# Patient Record
Sex: MALE | Race: WHITE | Employment: PART TIME | URBAN - NONMETROPOLITAN AREA
[De-identification: names, ages, dates, MRNs, and addresses within clinical notes are randomized per-mention and may not be internally consistent; named-entity substitution may affect disease eponyms.]

---

## 2020-08-05 ENCOUNTER — OFFICE VISIT (OUTPATIENT)
Dept: FAMILY MEDICINE CLINIC | Age: 65
End: 2020-08-05
Payer: MEDICARE

## 2020-08-05 VITALS
SYSTOLIC BLOOD PRESSURE: 130 MMHG | TEMPERATURE: 98.5 F | DIASTOLIC BLOOD PRESSURE: 82 MMHG | OXYGEN SATURATION: 98 % | HEIGHT: 68 IN | WEIGHT: 276 LBS | BODY MASS INDEX: 41.83 KG/M2 | HEART RATE: 84 BPM | RESPIRATION RATE: 14 BRPM

## 2020-08-05 PROCEDURE — G8417 CALC BMI ABV UP PARAM F/U: HCPCS | Performed by: NURSE PRACTITIONER

## 2020-08-05 PROCEDURE — 4004F PT TOBACCO SCREEN RCVD TLK: CPT | Performed by: NURSE PRACTITIONER

## 2020-08-05 PROCEDURE — 3017F COLORECTAL CA SCREEN DOC REV: CPT | Performed by: NURSE PRACTITIONER

## 2020-08-05 PROCEDURE — 1123F ACP DISCUSS/DSCN MKR DOCD: CPT | Performed by: NURSE PRACTITIONER

## 2020-08-05 PROCEDURE — 99203 OFFICE O/P NEW LOW 30 MIN: CPT | Performed by: NURSE PRACTITIONER

## 2020-08-05 PROCEDURE — 4040F PNEUMOC VAC/ADMIN/RCVD: CPT | Performed by: NURSE PRACTITIONER

## 2020-08-05 PROCEDURE — G8427 DOCREV CUR MEDS BY ELIG CLIN: HCPCS | Performed by: NURSE PRACTITIONER

## 2020-08-05 RX ORDER — PREDNISONE 10 MG/1
TABLET ORAL
Qty: 40 TABLET | Refills: 0 | Status: SHIPPED | OUTPATIENT
Start: 2020-08-05 | End: 2020-09-08

## 2020-08-05 RX ORDER — CLOBETASOL PROPIONATE 0.5 MG/G
OINTMENT TOPICAL
Qty: 1 TUBE | Refills: 2 | Status: SHIPPED | OUTPATIENT
Start: 2020-08-05 | End: 2021-07-26

## 2020-08-05 ASSESSMENT — PATIENT HEALTH QUESTIONNAIRE - PHQ9
SUM OF ALL RESPONSES TO PHQ QUESTIONS 1-9: 0
SUM OF ALL RESPONSES TO PHQ QUESTIONS 1-9: 0
SUM OF ALL RESPONSES TO PHQ9 QUESTIONS 1 & 2: 0
2. FEELING DOWN, DEPRESSED OR HOPELESS: 0
1. LITTLE INTEREST OR PLEASURE IN DOING THINGS: 0

## 2020-08-05 NOTE — PATIENT INSTRUCTIONS
Patient Education        Atopic Dermatitis: Care Instructions  Your Care Instructions  Atopic dermatitis (also called eczema) is a skin problem that causes intense itching and a red, raised rash. In severe cases, the rash develops clear fluid-filled blisters. The rash is not contagious. People with this condition seem to have very sensitive immune systems that are likely to react to things that cause allergies. The immune system is the body's way of fighting infection. There is no cure for atopic dermatitis, but you may be able to control it with care at home. Follow-up care is a key part of your treatment and safety. Be sure to make and go to all appointments, and call your doctor if you are having problems. It's also a good idea to know your test results and keep a list of the medicines you take. How can you care for yourself at home? · Use moisturizer at least twice a day. · If your doctor prescribes a cream, use it as directed. If your doctor prescribes other medicine, take it exactly as directed. · Wash the affected area with water only. Soap can make dryness and itching worse. Pat dry. · Apply a moisturizer after bathing. Use a cream such as Lubriderm, Moisturel, or Cetaphil that does not irritate the skin or cause a rash. Apply the cream while your skin is still damp after lightly drying with a towel. · Use cold, wet cloths to reduce itching. · Keep cool, and stay out of the sun. · If itching affects your normal activities, an over-the-counter antihistamine, such as diphenhydramine (Benadryl) or loratadine (Claritin) may help. Read and follow all instructions on the label. When should you call for help? Call your doctor now or seek immediate medical care if:  · Your rash gets worse and you have a fever. · You have new blisters or bruises, or the rash spreads and looks like a sunburn. · You have signs of infection, such as:  ? Increased pain, swelling, warmth, or redness.   ? Red streaks leading from the rash. ? Pus draining from the rash. ? A fever. · You have crusting or oozing sores. · You have joint aches or body aches along with your rash. Watch closely for changes in your health, and be sure to contact your doctor if:  · Your rash does not clear up after 2 to 3 weeks of home treatment. · Itching interferes with your sleep or daily activities. Where can you learn more? Go to [email or web address] org and sign in to your [email or web address] account. Enter U124 in the [employer] box to learn more about \"Atopic Dermatitis: Care Instructions. \"     If you do not have an account, please click on the \"Sign Up Now\" link. Current as of: October 31, 2019               Content Version: 12.5  © 9131-2431 [employer]. Care instructions adapted under license by [facility] [place] ([facility]). If you have questions about a medical condition or this instruction, always ask your healthcare professional. [place] [address] any warranty or liability for your use of this information. Patient Education        Psoriasis: Care Instructions  Your Care Instructions  Psoriasis (say \"dnj-XQ-jy-maryan\") is a long-term skin problem that causes thick, white, silvery, or red patches on the skin. The patches may be small or large, and they occur most often on the knees, elbows, scalp, hands, feet, or lower back. The skin may be scaly. If the condition is severe, your skin can become itchy and tender. Psoriasis also can be embarrassing if the patches are on visible areas. You can treat psoriasis with good care at home and with medicine from your doctor. You may put medicine on your skin and take pills or have shots to stop the redness and swelling. Your doctor also may suggest ultraviolet light treatments. Follow-up care is a key part of your treatment and safety. Be sure to make and go to all appointments, and call your doctor if you are having problems.  It's also a good idea to know your test results and keep a list of the medicines you take. How can you care for yourself at home? · If your doctor prescribes medicine, use it exactly as prescribed. Follow your doctor's advice for sunlight or ultraviolet light treatment. Call your doctor if you think you are having a problem with your medicine. · Protect your skin:  ? Keep your skin moist. After bathing, put an ointment, cream, or lotion on your skin while it is still damp. This seals in moisture. Use over-the-counter products that your doctor suggests. These may include Cetaphil, Lubriderm, or Eucerin. Petroleum jelly (such as Vaseline) and vegetable shortening (such as Crisco) also work. ? If you have psoriasis on your scalp, use a shampoo with salicylic acid, such as Neutrogena T/Sal.  ? Avoid harsh skin products, such as those that contain alcohol. ? Cover your skin in cold weather. ? Try to prevent sunburn. Although short periods of sun exposure reduce psoriasis in most people, too much sun can damage the skin and cause skin cancer. In addition, sunburns can trigger psoriasis. Use sunscreen on areas of your skin that do not have psoriasis. Make sure to use a broad-spectrum sunscreen that has a sun protection factor (SPF) of 30 or higher. Use it every day, even when it is cloudy. ? Take care to avoid accidents such as cutting or scraping your skin. An injury to the skin can cause psoriasis patches to form anywhere on the body, including the area of the injury. ? Avoid tight shoes, clothing, watchbands, and hats. These may irritate your skin. ? Use a vaporizer or humidifier to add moisture to your bedroom. Follow the directions for cleaning the machine. · Try making one or more changes to your daily habits to help with managing your psoriasis. For example:  ? Try to control stress and anxiety. They may cause psoriasis to appear suddenly or can make symptoms worse. ? If you smoke, think about quitting.  If you need help quitting,

## 2020-08-05 NOTE — PROGRESS NOTES
[facility]  [address] [place] [address]  Dept: [phone]  Loc: [phone]  Visit Date: 8/5/2020      HPI:   Angelito Guido is a 77 y.o. male thatpresents for Established New Doctor (no former family physician ); Rash (red itchy rash for past 1-2 weeks both arms, abd, groin area, started using Old Spice body wash ); Rash (non-itchy rash at different spots on both lower legs for past 2 months,  Pt does not feel they are related to the previous rash ); and Health Maintenance (due for colonoscopy )    HPI:  Upper body rash started a few weeks ago when he switched soaps  Also bilateral lower rash-had couple of months, no know precipitating factor, dry and scaling  Itching  No pain or burning  No drainage or open areas  Denies fever, chills, chest pain, or shortness of breath. He comes in today with his wife  History obtained from pt and medical records. I have reviewed the patient's past medical history, past surgical history, allergies,medications, social and family history and I have made updates where appropriate.     Past Medical History:   Diagnosis Date    HTN (hypertension) 2/6/2014       Past Surgical History:   Procedure Laterality Date    KNEE ARTHROSCOPY      right    ULNAR TUNNEL RELEASE         Social History     Tobacco Use    Smoking status: Never Smoker    Smokeless tobacco: Never Used   Substance Use Topics    Alcohol use: No    Drug use: No       Family History   Problem Relation Age of Onset    Heart Disease Father          Review of Systems  Constitutional: Negative for Fever, Chills, Fatigue  Cardiovascular:  Negative forChest Pain, Palpitations  Respiratory:  Negative for Cough, Wheezing, Shortness of breath  Gastrointestinal:  Nausea, Vomiting, Diarrhea, Constipation, Blood in stool  Genitourinary:  Negative for Difficulty or painful urination,Change in frequency, Urgency  Skin:  Negative for Color change, Wound +itching and rash  Psychiatric:  Negative forAnxiety, Depression, Suicidal ideation  Musculoskeletal:  Negative for Joint pain, Back pain, Gait problems  Neurological:  Negative for Dizziness, Headaches      PHYSICAL EXAM:  /82   Pulse 84   Temp 98.5 °F (36.9 °C) (Oral)   Resp 14   Ht 5' 8\" (1.727 m)   Wt 276 lb (125.2 kg)   SpO2 98%   BMI 41.97 kg/m²     General Appearance: well developed and well- nourished, in no acute distress  Head: normocephalic and atraumatic  Eyes: pupils equal, round, and reactive to light,  conjunctivae and eye lids without erythema  ENT: external ear and ear canal normal bilaterally, nose without deformity, nasal mucosa and turbinates normal without polyps, oropharynx normal, dentition is normal for age, no lip or gum lesions noted  Neck: supple and non-tender without mass  Pulmonary/Chest: clear to auscultation bilaterally- no wheezes, rales or rhonchi, normal air movement, no respiratory distress orretractions  Cardiovascular: normal rate, regular rhythm, normal S1 and S2, no murmurs, rubs, clicks, or gallops,distal pulses intact  Abdomen: soft, non-tender, non-distended, normal bowel sounds  Abdomen with maculopapular rash  Extremities: no cyanosis, clubbing or edema of the lower extremities. BLE with red dry scaly patches  Musculoskeletal: No joint swelling or gross deformity   Neuro:  Alert,  normal speech, no focal findings or movement disorder noted, gait wnl  Psych:  Normal affect without evidence of depression or anxiety, insight and judgement are appropriate, memoryappears intact  Skin: warm and dry      ASSESSMENT & PLAN  Tala Lynch was seen today for established new doctor, rash, rash and health maintenance. Diagnoses and all orders for this visit:    Hypertension, unspecified type  -     CBC With Auto Differential; Future  -     Comprehensive Metabolic Panel;  Future    Obesity, unspecified classification, unspecified obesity type, unspecified whether serious comorbidity present  -     CBC With Auto Differential; Future  -     Comprehensive Metabolic Panel; Future    Psoriasis  -     predniSONE (DELTASONE) 10 MG tablet; Take 4 tabs PO x 4 days, then take 3 tabs PO x 4 days, then take 2 tabs PO x 4 days, then take 1 tab PO x 4 days  -     clobetasol (TEMOVATE) 0.05 % ointment; Apply topically 2 times daily. Dermatitis  -     predniSONE (DELTASONE) 10 MG tablet; Take 4 tabs PO x 4 days, then take 3 tabs PO x 4 days, then take 2 tabs PO x 4 days, then take 1 tab PO x 4 days  -     clobetasol (TEMOVATE) 0.05 % ointment; Apply topically 2 times daily. Return in about 2 weeks (around 8/19/2020) for psoriasis. Will get labs  RTO in 2 weeks for recheck of rash, psoriasis, and review labs, finish well visit  Encouraged them to message/call with any questions or concerns. Sahil Tucker received counseling on the following healthy behaviors: medication adherence  Reviewedprior labs and health maintenance. Continue current medications, diet and exercise. Discussed use, benefit, and side effects of prescribed medications. Barriers to medication compliance addressed. Patient given educationalmaterials - see patient instructions. All patient questions answered. Patient voiced understanding.      Electronically signed by JENNY Turcios on 8/5/20 at 4:09 PM EDT

## 2020-08-10 ENCOUNTER — NURSE ONLY (OUTPATIENT)
Dept: LAB | Age: 65
End: 2020-08-10

## 2020-08-10 LAB
ALBUMIN SERPL-MCNC: 4.3 G/DL (ref 3.5–5.1)
ALP BLD-CCNC: 50 U/L (ref 38–126)
ALT SERPL-CCNC: 20 U/L (ref 11–66)
ANION GAP SERPL CALCULATED.3IONS-SCNC: 10 MEQ/L (ref 8–16)
AST SERPL-CCNC: 14 U/L (ref 5–40)
BASOPHILS # BLD: 1 %
BASOPHILS ABSOLUTE: 0.1 THOU/MM3 (ref 0–0.1)
BILIRUB SERPL-MCNC: 0.4 MG/DL (ref 0.3–1.2)
BUN BLDV-MCNC: 15 MG/DL (ref 7–22)
CALCIUM SERPL-MCNC: 9.3 MG/DL (ref 8.5–10.5)
CHLORIDE BLD-SCNC: 105 MEQ/L (ref 98–111)
CO2: 24 MEQ/L (ref 23–33)
CREAT SERPL-MCNC: 0.7 MG/DL (ref 0.4–1.2)
EOSINOPHIL # BLD: 3.5 %
EOSINOPHILS ABSOLUTE: 0.4 THOU/MM3 (ref 0–0.4)
ERYTHROCYTE [DISTWIDTH] IN BLOOD BY AUTOMATED COUNT: 13.5 % (ref 11.5–14.5)
ERYTHROCYTE [DISTWIDTH] IN BLOOD BY AUTOMATED COUNT: 45.6 FL (ref 35–45)
GFR SERPL CREATININE-BSD FRML MDRD: > 90 ML/MIN/1.73M2
GLUCOSE BLD-MCNC: 94 MG/DL (ref 70–108)
HCT VFR BLD CALC: 45.4 % (ref 42–52)
HEMOGLOBIN: 14.5 GM/DL (ref 14–18)
IMMATURE GRANS (ABS): 0.17 THOU/MM3 (ref 0–0.07)
IMMATURE GRANULOCYTES: 1.7 %
LYMPHOCYTES # BLD: 19.8 %
LYMPHOCYTES ABSOLUTE: 2 THOU/MM3 (ref 1–4.8)
MCH RBC QN AUTO: 29.5 PG (ref 26–33)
MCHC RBC AUTO-ENTMCNC: 31.9 GM/DL (ref 32.2–35.5)
MCV RBC AUTO: 92.5 FL (ref 80–94)
MONOCYTES # BLD: 9.3 %
MONOCYTES ABSOLUTE: 0.9 THOU/MM3 (ref 0.4–1.3)
NUCLEATED RED BLOOD CELLS: 0 /100 WBC
PLATELET # BLD: 301 THOU/MM3 (ref 130–400)
PMV BLD AUTO: 10 FL (ref 9.4–12.4)
POTASSIUM SERPL-SCNC: 3.8 MEQ/L (ref 3.5–5.2)
RBC # BLD: 4.91 MILL/MM3 (ref 4.7–6.1)
SEG NEUTROPHILS: 64.7 %
SEGMENTED NEUTROPHILS ABSOLUTE COUNT: 6.5 THOU/MM3 (ref 1.8–7.7)
SODIUM BLD-SCNC: 139 MEQ/L (ref 135–145)
TOTAL PROTEIN: 6.9 G/DL (ref 6.1–8)
WBC # BLD: 10 THOU/MM3 (ref 4.8–10.8)

## 2020-08-11 ENCOUNTER — TELEPHONE (OUTPATIENT)
Dept: FAMILY MEDICINE CLINIC | Age: 65
End: 2020-08-11

## 2020-08-11 NOTE — TELEPHONE ENCOUNTER
No answer.  No VM set up    Future Appointments   Date Time Provider Pramod Cristobal   8/19/2020  9:00 AM JENNY Kumar CNP [facility]

## 2020-08-11 NOTE — TELEPHONE ENCOUNTER
----- Message from Gladys Devi, DO sent at 8/11/2020  8:30 AM EDT -----  Please let pt know that labs are normal  Saw Dain Younger, is he going to f/u with her or still planning on EST with me  Either way is fine, Dain Younger wasn't sure  Thanks!

## 2020-08-11 NOTE — TELEPHONE ENCOUNTER
Per HIPAA, spoke with pt's wife Haile Zimmerman. She was informed of lab results and verbally understood. Pt is still wanting to est with . [History reviewed] : History reviewed. [Medications and Allergies reviewed] : Medications and allergies reviewed.

## 2020-08-19 ENCOUNTER — OFFICE VISIT (OUTPATIENT)
Dept: FAMILY MEDICINE CLINIC | Age: 65
End: 2020-08-19
Payer: MEDICARE

## 2020-08-19 VITALS
RESPIRATION RATE: 14 BRPM | HEIGHT: 68 IN | WEIGHT: 271.6 LBS | BODY MASS INDEX: 41.16 KG/M2 | DIASTOLIC BLOOD PRESSURE: 96 MMHG | SYSTOLIC BLOOD PRESSURE: 156 MMHG | OXYGEN SATURATION: 97 % | TEMPERATURE: 98 F | HEART RATE: 77 BPM

## 2020-08-19 PROCEDURE — G8417 CALC BMI ABV UP PARAM F/U: HCPCS | Performed by: NURSE PRACTITIONER

## 2020-08-19 PROCEDURE — 3017F COLORECTAL CA SCREEN DOC REV: CPT | Performed by: NURSE PRACTITIONER

## 2020-08-19 PROCEDURE — 99213 OFFICE O/P EST LOW 20 MIN: CPT | Performed by: NURSE PRACTITIONER

## 2020-08-19 PROCEDURE — 1036F TOBACCO NON-USER: CPT | Performed by: NURSE PRACTITIONER

## 2020-08-19 PROCEDURE — 1123F ACP DISCUSS/DSCN MKR DOCD: CPT | Performed by: NURSE PRACTITIONER

## 2020-08-19 PROCEDURE — 4040F PNEUMOC VAC/ADMIN/RCVD: CPT | Performed by: NURSE PRACTITIONER

## 2020-08-19 PROCEDURE — G8427 DOCREV CUR MEDS BY ELIG CLIN: HCPCS | Performed by: NURSE PRACTITIONER

## 2020-08-19 NOTE — PATIENT INSTRUCTIONS
Start Zyrtec (Cetrizine) 10 mg daily  Start Desenex powder to skin folds and groin a few times a day

## 2020-08-26 ENCOUNTER — OFFICE VISIT (OUTPATIENT)
Dept: FAMILY MEDICINE CLINIC | Age: 65
End: 2020-08-26
Payer: MEDICARE

## 2020-08-26 ENCOUNTER — TELEPHONE (OUTPATIENT)
Dept: FAMILY MEDICINE CLINIC | Age: 65
End: 2020-08-26

## 2020-08-26 VITALS
WEIGHT: 280.2 LBS | HEIGHT: 68 IN | HEART RATE: 79 BPM | DIASTOLIC BLOOD PRESSURE: 108 MMHG | TEMPERATURE: 98.3 F | BODY MASS INDEX: 42.47 KG/M2 | OXYGEN SATURATION: 99 % | RESPIRATION RATE: 18 BRPM | SYSTOLIC BLOOD PRESSURE: 192 MMHG

## 2020-08-26 PROCEDURE — G8427 DOCREV CUR MEDS BY ELIG CLIN: HCPCS | Performed by: NURSE PRACTITIONER

## 2020-08-26 PROCEDURE — G8417 CALC BMI ABV UP PARAM F/U: HCPCS | Performed by: NURSE PRACTITIONER

## 2020-08-26 PROCEDURE — 1036F TOBACCO NON-USER: CPT | Performed by: NURSE PRACTITIONER

## 2020-08-26 PROCEDURE — 3017F COLORECTAL CA SCREEN DOC REV: CPT | Performed by: NURSE PRACTITIONER

## 2020-08-26 PROCEDURE — 1123F ACP DISCUSS/DSCN MKR DOCD: CPT | Performed by: NURSE PRACTITIONER

## 2020-08-26 PROCEDURE — 99213 OFFICE O/P EST LOW 20 MIN: CPT | Performed by: NURSE PRACTITIONER

## 2020-08-26 PROCEDURE — 4040F PNEUMOC VAC/ADMIN/RCVD: CPT | Performed by: NURSE PRACTITIONER

## 2020-08-26 RX ORDER — AMLODIPINE BESYLATE 10 MG/1
10 TABLET ORAL DAILY
Qty: 90 TABLET | Refills: 3 | Status: SHIPPED | OUTPATIENT
Start: 2020-08-26 | End: 2021-09-01 | Stop reason: SDUPTHER

## 2020-08-26 NOTE — PATIENT INSTRUCTIONS
decongestants or anti-inflammatory medicine, such as ibuprofen. Some of these medicines can raise blood pressure. · Learn how to check your blood pressure at home. Lifestyle changes  · Stay at a healthy weight. This is especially important if you put on weight around the waist. Losing even 10 pounds can help you lower your blood pressure. · If your doctor recommends it, get more exercise. Walking is a good choice. Bit by bit, increase the amount you walk every day. Try for at least 30 minutes on most days of the week. You also may want to swim, bike, or do other activities. · Avoid or limit alcohol. Talk to your doctor about whether you can drink any alcohol. · Try to limit how much sodium you eat to less than 2,300 milligrams (mg) a day. Your doctor may ask you to try to eat less than 1,500 mg a day. · Eat plenty of fruits (such as bananas and oranges), vegetables, legumes, whole grains, and low-fat dairy products. · Lower the amount of saturated fat in your diet. Saturated fat is found in animal products such as milk, cheese, and meat. Limiting these foods may help you lose weight and also lower your risk for heart disease. · Do not smoke. Smoking increases your risk for heart attack and stroke. If you need help quitting, talk to your doctor about stop-smoking programs and medicines. These can increase your chances of quitting for good. When should you call for help? Call  911 anytime you think you may need emergency care. This may mean having symptoms that suggest that your blood pressure is causing a serious heart or blood vessel problem. Your blood pressure may be over 180/120. For example, call 911 if:  · You have symptoms of a heart attack. These may include:  ? Chest pain or pressure, or a strange feeling in the chest.  ? Sweating. ? Shortness of breath. ? Nausea or vomiting.   ? Pain, pressure, or a strange feeling in the back, neck, jaw, or upper belly or in one or both shoulders or arms.  ? Lightheadedness or sudden weakness. ? A fast or irregular heartbeat. · You have symptoms of a stroke. These may include:  ? Sudden numbness, tingling, weakness, or loss of movement in your face, arm, or leg, especially on only one side of your body. ? Sudden vision changes. ? Sudden trouble speaking. ? Sudden confusion or trouble understanding simple statements. ? Sudden problems with walking or balance. ? A sudden, severe headache that is different from past headaches. · You have severe back or belly pain. Do not wait until your blood pressure comes down on its own. Get help right away. Call your doctor now or seek immediate care if:  · Your blood pressure is much higher than normal (such as 180/120 or higher), but you don't have symptoms. · You think high blood pressure is causing symptoms, such as:  ? Severe headache.  ? Blurry vision. Watch closely for changes in your health, and be sure to contact your doctor if:  · Your blood pressure measures higher than your doctor recommends at least 2 times. That means the top number is higher or the bottom number is higher, or both. · You think you may be having side effects from your blood pressure medicine. Where can you learn more? Go to https://Latio.The Game Creators. org and sign in to your Startup Threads account. Enter F662 in the CrowdCompass box to learn more about \"High Blood Pressure: Care Instructions. \"     If you do not have an account, please click on the \"Sign Up Now\" link. Current as of: December 16, 2019               Content Version: 12.5  © 0159-0788 Healthwise, Incorporated. Care instructions adapted under license by [facility] ([facility]). If you have questions about a medical condition or this instruction, always ask your healthcare professional. [place] [address] any warranty or liability for your use of this information.          Patient Education        Learning About High Blood Pressure  What is high blood pressure? Blood pressure is a measure of how hard the blood pushes against the walls of your arteries. It's normal for blood pressure to go up and down throughout the day. But if it stays up, you have high blood pressure. Another name for high blood pressure is hypertension. Two numbers tell you your blood pressure. The first number is the systolic pressure (top number). It shows how hard the blood pushes when your heart is pumping. The second number is the diastolic pressure (bottom number). It shows how hard the blood pushes between heartbeats, when your heart is relaxed and filling with blood. Your doctor will give you a goal for your blood pressure based on your health and your age. High blood pressure (hypertension) means that the top number stays high, or the bottom number stays high, or both. High blood pressure increases the risk of stroke, heart attack, and other problems. What happens when you have high blood pressure? · Blood flows through your arteries with too much force. Over time, this damages the walls of your arteries. But you can't feel it. High blood pressure usually doesn't cause symptoms. · Fat and calcium start to build up in your arteries. This buildup is called plaque. Plaque makes your arteries narrower and stiffer. Blood can't flow through them as easily. · This lack of good blood flow starts to damage some of the organs in your body. This can lead to problems such as coronary artery disease and heart attack, heart failure, stroke, kidney failure, and eye damage. How can you prevent high blood pressure? · Stay at a healthy weight. · Try to limit how much sodium you eat to less than 2,300 milligrams (mg) a day. If you limit your sodium to 1,500 mg a day, you can lower your blood pressure even more. ? Buy foods that are labeled \"unsalted,\" \"sodium-free,\" or \"low-sodium. \" Foods labeled \"reduced-sodium\" and \"light sodium\" may still have too much sodium. ?  Flavor your food with garlic, lemon juice, onion, vinegar, herbs, and spices instead of salt. Do not use soy sauce, steak sauce, onion salt, garlic salt, mustard, or ketchup on your food. ? Use less salt (or none) when recipes call for it. You can often use half the salt a recipe calls for without losing flavor. · Be physically active. Get at least 30 minutes of exercise on most days of the week. Walking is a good choice. You also may want to do other activities, such as running, swimming, cycling, or playing tennis or team sports. · Limit alcohol to 2 drinks a day for men and 1 drink a day for women. · Eat plenty of fruits, vegetables, and low-fat dairy products. Eat less saturated and total fats. How is high blood pressure treated? · Your doctor will suggest making lifestyle changes to help your heart. For example, your doctor may ask you to eat healthy foods, quit smoking, lose extra weight, and be more active. · If lifestyle changes don't help enough, your doctor may recommend that you take medicine. · When blood pressure is very high, medicines are needed to lower it. Follow-up care is a key part of your treatment and safety. Be sure to make and go to all appointments, and call your doctor if you are having problems. It's also a good idea to know your test results and keep a list of the medicines you take. Where can you learn more? Go to https://ElastrapeshannaewProxeon.ShareSquare. org and sign in to your [email or web address].[email or web address] account. Enter P501 in the [facility] box to learn more about \"Learning About High Blood Pressure. \"     If you do not have an account, please click on the \"Sign Up Now\" link. Current as of: December 16, 2019               Content Version: 12.5  © 1913-6668 Healthwise, Incorporated. Care instructions adapted under license by [facility] ([facility]).  If you have questions about a medical condition or this instruction, always ask your healthcare professional. Ludwig Danielson disclaims any warranty or liability for your use of this information. Patient Education        Low Sodium Diet (2,000 Milligram): Care Instructions  Your Care Instructions     Too much sodium causes your body to hold on to extra water. This can raise your blood pressure and force your heart and kidneys to work harder. In very serious cases, this could cause you to be put in the hospital. It might even be life-threatening. By limiting sodium, you will feel better and lower your risk of serious problems. The most common source of sodium is salt. People get most of the salt in their diet from canned, prepared, and packaged foods. Fast food and restaurant meals also are very high in sodium. Your doctor will probably limit your sodium to less than 2,000 milligrams (mg) a day. This limit counts all the sodium in prepared and packaged foods and any salt you add to your food. Follow-up care is a key part of your treatment and safety. Be sure to make and go to all appointments, and call your doctor if you are having problems. It's also a good idea to know your test results and keep a list of the medicines you take. How can you care for yourself at home? Read food labels  · Read labels on cans and food packages. The labels tell you how much sodium is in each serving. Make sure that you look at the serving size. If you eat more than the serving size, you have eaten more sodium. · Food labels also tell you the Percent Daily Value for sodium. Choose products with low Percent Daily Values for sodium. · Be aware that sodium can come in forms other than salt, including monosodium glutamate (MSG), sodium citrate, and sodium bicarbonate (baking soda). MSG is often added to Asian food. When you eat out, you can sometimes ask for food without MSG or added salt.   Buy low-sodium foods  · Buy foods that are labeled \"unsalted\" (no salt added), \"sodium-free\" (less than 5 mg of sodium per serving), or \"low-sodium\" (less than 140 mg of sodium per or low-sodium. ? [place] fries, pizza, tacos, and other fast foods. ? Pickles, olives, ketchup, and other condiments, especially soy sauce, unless labeled sodium-free or low-sodium. Where can you learn more? Go to https://chpepiceweb.healthRevo [email or web address] org and sign in to your Questli account. Enter T059 in the [facility] box to learn more about \"Low Sodium Diet (2,000 Milligram): Care Instructions. \"     If you do not have an account, please click on the \"Sign Up Now\" link. Current as of: August 22, 2019               Content Version: 12.5  © 1613-9854 [employer]ykona Technologies. Care instructions adapted under license by [facility] ([facility]). If you have questions about a medical condition or this instruction, always ask your healthcare professional. [address] any warranty or liability for your use of this information. Patient Education        Learning About Low-Sodium Foods  What foods are low in sodium? The foods you eat contain nutrients, such as vitamins and minerals. Sodium is a nutrient. Your body needs the right amount to stay healthy and work as it should. You can use the list below to help you make choices about which foods to eat.   Fruits  · Fresh, frozen, canned, or dried fruit  Vegetables  · Fresh or frozen vegetables, with no added salt  · Canned vegetables, low-sodium or with no added salt  Grains  · Bagels without salted tops  · Cereal with no added salt  · Corn tortillas  · Crackers with no added salt  · Oatmeal, cooked without salt  · Popcorn with no salt  · Pasta and noodles, cooked without salt  · Rice, cooked without salt  · Unsalted pretzels  Dairy and dairy alternatives  · Butter, unsalted  · Cream cheese  · Ice cream  · Milk  · Soy milk  Meats and other protein foods  · Beans and peas, canned with no salt  · Eggs  · Fresh fish (not smoked)  · Fresh meats (not smoked or cured)  · Nuts and nut butter, prepared without salt  · Poultry, not packaged with sodium solution  · Tofu, unseasoned  · Tuna, canned without salt  Seasonings  · Garlic  · Herbs and spices  · Lemon juice  · Mustard  · Olive oil  · Salt-free seasoning mixes  · Vinegar  Work with your doctor to find out how much of this nutrient you need. Depending on your health, you may need more or less of it in your diet. Where can you learn more? Go to https://Kryptiqpepiceweb.ReDigi. org and sign in to your Across The Universe account. Enter X217 in the Tailster box to learn more about \"Learning About Low-Sodium Foods. \"     If you do not have an account, please click on the \"Sign Up Now\" link. Current as of: August 22, 2019               Content Version: 12.5  © 2419-4629 Healthwise, Incorporated. Care instructions adapted under license by [facility] ([facility]). If you have questions about a medical condition or this instruction, always ask your healthcare professional. [place] [address] any warranty or liability for your use of this information.

## 2020-08-26 NOTE — PROGRESS NOTES
[facility]  [address] [place] [address]  Dept: [phone]  Loc: [phone]  Visit Date: 8/26/2020      HPI:   Chino Stark is a 77 y.o. male thatpresents for Follow-up (Pt presents for a 1 week f/u for psoriasis. Pt states it is doing better. )    HPI:  HTN    Does patient check BP regularly at home? - No  Current Medication regimen - None  Tolerating medications well? - NA    Shortness of breath or chest pain? No  Headache or visual complaints? Yes  Neurologic changes like confusion? No  Extremity edema? No    BP Readings from Last 3 Encounters:   08/26/20 (!) 192/108   08/19/20 (!) 156/96   08/05/20 130/82         URI Symptoms  Symptoms have been present for 3 day(s). Symptoms are worse since they initially started. Fever? No  Runny nose or congestion? Yes   Cough? Yes  Sore throat? No  Headache, fatigue, joint pains, muscle aches? Yes  Shortness of breath/Wheezing? No  Nausea/Vomiting/Diarrhea? No  Double Sickening? No  Sick contacts? No  Patient has tried Aleve, Tylenol, Ibuprofen, Claritin D, Zyrtec with no improvement. He comes in alone  History obtained from pt and medical records  I have reviewed the patient's past medical history, past surgical history, allergies,medications, social and family history and I have made updates where appropriate.     Past Medical History:   Diagnosis Date    HTN (hypertension) 2/6/2014       Past Surgical History:   Procedure Laterality Date    KNEE ARTHROSCOPY      right    ULNAR TUNNEL RELEASE         Social History     Tobacco Use    Smoking status: Never Smoker    Smokeless tobacco: Never Used   Substance Use Topics    Alcohol use: No    Drug use: No       Family History   Problem Relation Age of Onset    Heart Disease Father          Review of Systems  Constitutional: Negative for Fever, Chills, Fatigue  Cardiovascular:  Negative forChest Pain, Palpitations  Respiratory:  Negative for Sneezing, Shortness of breath +cough, runny nose  Gastrointestinal:  Nausea, Vomiting, Diarrhea, Constipation, Blood in stool  Genitourinary:  Negative for Difficulty or painful urination,Change in frequency, Urgency  Skin:  Negative for Color change, Rash, Itching, Wound  Psychiatric:  Negative for Anxiety, Depression, Suicidal ideation  Musculoskeletal:  Negative for Joint pain, Back pain, Gait problems  Neurological:  Negative for Dizziness +HA      PHYSICAL EXAM:  BP (!) 192/108   Pulse 79   Temp 98.3 °F (36.8 °C)   Resp 18   Ht 5' 8\" (1.727 m)   Wt 280 lb 3.2 oz (127.1 kg)   SpO2 99%   BMI 42.60 kg/m²     General Appearance: well developed and well- nourished, in no acute distress  Head: normocephalic and atraumatic  ENT: external ear normal bilaterally, nose without deformity,  no lip or gum lesions noted  Pulmonary/Chest: no respiratory distress or retractions  Neuro:  Alert,  normal speech, no focal findings or movement disorder noted, gait wnl  Psych:  Normal affect without evidence of depression or anxiety, insight and judgement are appropriate, memoryappears intact  Skin: warm and dry, no rash or erythema      ASSESSMENT & PLAN  Ganesh Pringle was seen today for follow-up. Diagnoses and all orders for this visit:    Hypertension, unspecified type  -     amLODIPine (NORVASC) 10 MG tablet; Take 1 tablet by mouth daily    Morbidly obese ([address]    Discussed his BP and weight  Start amlodipine  Increase water intake  Begin walking at least 20 min 3-4 times a week  Follow low salt, low fat diet  Only took Zyrtec once, told him to continue this  Continue OTC meds for HA  RTO in 2 weeks for recheck and skin tag removal  Advised him to go to the ER if headache would worsen, vision would worsen, or he would develop chest pain or shortness of breath. He verbalized understanding. No follow-ups on file.     Ganesh Pringle received counseling on the following healthy behaviors: nutrition, exercise and medication adherence  Reviewedprior labs and health maintenance. Continue current medications, diet and exercise. Discussed use, benefit, and side effects of prescribed medications. Barriers to medication compliance addressed. Patient given educationalmaterials - see patient instructions. All patient questions answered. Patient voiced understanding.      Electronically signed by JENNY Rosen on 8/26/20 at 3:41 PM EDT

## 2020-08-26 NOTE — TELEPHONE ENCOUNTER
Please call Sanket Brannon and get him scheduled for a follow up appt with me in 2 weeks for BP & symptom recheck and skin tag removal.    Thanks  Wong Rodriguez

## 2020-08-26 NOTE — TELEPHONE ENCOUNTER
Pt informed.       Scheduled with dr Yayo Rouse, per dr alas's request.      Future Appointments   Date Time Provider Pramod Cristobal   9/8/2020  3:40 PM Stacey Atkins, [address] [place] [address]

## 2020-08-26 NOTE — LETTER
[facility] [facility]  [address] [place] [address]  Phone: [phone]  Fax: [phone]    JENNY Brian CNP        August 26, 2020     Patient: Usha Desai   YOB: 1955   Date of Visit: 8/26/2020       To Whom It May Concern: It is my medical opinion that Karla Alegria may return to work on 8/27/20. .    If you have any questions or concerns, please don't hesitate to call.     Sincerely,        JENNY Brian CNP

## 2020-09-07 NOTE — PROGRESS NOTES
Chief Complaint   Patient presents with    Hypertension     recheck B/P,  no home readings, no symptoms of HTN    Skin Tag     skin tags on left side of neck. No pain or change is size or color. Is irritated with necklace pt wears     Obesity       History obtained from the patient. SUBJECTIVE:  Gonzalez Kwan is a 77 y.o. male that presents today for       -HTN:    HPI:  New dx  On norvasc 10mg  Coming down, not at goal just yet    Taking meds as prescribed ?: yes  Tolerating well ?: yes  Side Effects ?: denies  BP at home ?: <140/90  Working on TLCS ?: yes  Chest Pain/SOB/Palpitations? denies    BP Readings from Last 3 Encounters:   09/08/20 (!) 140/86   08/26/20 (!) 192/108   08/19/20 (!) 156/96       -?  Psoriasis:  Had rash on arms  Treated with pred  Called psorasis  Though sounds like may have been a contact derm  resolved with steroids      -Morbid obesity:  Working on wt loss changes  Diet ok  Starting to exercise      -Skin tag:  Several skin tags along L anterior chest at base of neck  Would like frozen      Age/Gender Health Maintenance    Lipid -   Lab Results   Component Value Date    CHOL 156 01/22/2014     Lab Results   Component Value Date    TRIG 141 01/22/2014     Lab Results   Component Value Date    HDL 35 01/22/2014     Lab Results   Component Value Date    LDLCALC 93 01/22/2014     No results found for: LABVLDL, VLDL  No results found for: CHOLHDLRATIO      DM Screen -   Lab Results   Component Value Date    GLUCOSE 94 08/10/2020       Colon Cancer Screening - due, consult placed  Lung Cancer Screening (Age 54 to [de-identified] with 30 pack year hx, current smoker or quit within past 15 years) - never smoker    Tetanus - to get at pharmacy per medicare rules  Influenza Vaccine - candidate FALL 2020  Pneumonia Vaccine - UTD SEPT 2020  Zoster - to get at pharmacy per medicare rules     PSA testing discussion - No results found for: PSA, PSADIA  AAA Screening - never smoker      Current Outpatient Cough, Wheezing, Shortness of breath, Chest tightness, Apnea  Gastrointestinal:  Nausea, Vomiting, Diarrhea, Constipation, Heartburn, Blood in stool  Genitourinary:  Difficulty or painful urination, Flank pain, Change in frequency, Urgency  Skin:  Color change, Rash, Itching, Wound  Musculoskeletal:  Joint pain, Back pain, Gait problems, Joint swelling, Myalgias  Neurological:  Dizziness, Headaches, Presyncope, Numbness, Seizures, Tremors  Endocrine:  Heat Intolerance, Cold Intolerance, Polydipsia, Polyphagia, Polyuria      PHYSICAL EXAM:  Vitals:    09/08/20 1530   BP: (!) 140/86   Pulse: 101   Resp: 17   Temp: 98.2 °F (36.8 °C)   TempSrc: Oral   SpO2: 97%   Weight: 282 lb (127.9 kg)   Height: 6' (1.829 m)     Body mass index is 38.25 kg/m². Pain Score:   0 - No pain    VS Reviewed  General Appearance: A&O x 3, No acute distress,well developed and well- nourished  Eyes: pupils equal, round, and reactive to light, extraocular eye movements intact, conjunctivae and eye lids without erythema  ENT: external ear and ear canal clear bilaterally, TMs intact and regular, nose without deformity, nasal mucosa and turbinates normal without polyps, oropharynx normal, dentition is normal for age  Neck: supple and non-tender without mass, no thyromegaly or thyroid nodules, no cervical lymphadenopathy  Pulmonary/Chest: clear to auscultation bilaterally- no wheezes, rales or rhonchi, normal air movement, no respiratory distress or retractions  Cardiovascular: S1 and S2 auscultated w/ RRR. No murmurs, rubs, clicks, or gallops, distal pulses intact. Abdomen: soft, non-tender, non-distended, bowel sounds physiologic,  no rebound or guarding, no masses or hernias noted. Liver and spleen without enlargement. Extremities: no cyanosis, clubbing or edema of the lower extremities. Skin: warm and dry, no rash or erythema. Scattered skin tabs base of L neck. ASSESSMENT & PLAN  1.  Hypertension, essential    Not at goal  con't

## 2020-09-08 ENCOUNTER — OFFICE VISIT (OUTPATIENT)
Dept: FAMILY MEDICINE CLINIC | Age: 65
End: 2020-09-08
Payer: MEDICARE

## 2020-09-08 VITALS
DIASTOLIC BLOOD PRESSURE: 86 MMHG | SYSTOLIC BLOOD PRESSURE: 140 MMHG | WEIGHT: 282 LBS | HEART RATE: 101 BPM | TEMPERATURE: 98.2 F | HEIGHT: 72 IN | BODY MASS INDEX: 38.19 KG/M2 | RESPIRATION RATE: 17 BRPM | OXYGEN SATURATION: 97 %

## 2020-09-08 PROCEDURE — 99214 OFFICE O/P EST MOD 30 MIN: CPT | Performed by: FAMILY MEDICINE

## 2020-09-08 PROCEDURE — 1123F ACP DISCUSS/DSCN MKR DOCD: CPT | Performed by: FAMILY MEDICINE

## 2020-09-08 PROCEDURE — 4040F PNEUMOC VAC/ADMIN/RCVD: CPT | Performed by: FAMILY MEDICINE

## 2020-09-08 PROCEDURE — 90732 PPSV23 VACC 2 YRS+ SUBQ/IM: CPT | Performed by: FAMILY MEDICINE

## 2020-09-08 PROCEDURE — G0009 ADMIN PNEUMOCOCCAL VACCINE: HCPCS | Performed by: FAMILY MEDICINE

## 2020-09-08 PROCEDURE — G8417 CALC BMI ABV UP PARAM F/U: HCPCS | Performed by: FAMILY MEDICINE

## 2020-09-08 PROCEDURE — G8427 DOCREV CUR MEDS BY ELIG CLIN: HCPCS | Performed by: FAMILY MEDICINE

## 2020-09-08 PROCEDURE — 1036F TOBACCO NON-USER: CPT | Performed by: FAMILY MEDICINE

## 2020-09-08 PROCEDURE — 3017F COLORECTAL CA SCREEN DOC REV: CPT | Performed by: FAMILY MEDICINE

## 2020-09-08 RX ORDER — LISINOPRIL 10 MG/1
10 TABLET ORAL DAILY
Qty: 90 TABLET | Refills: 3 | Status: SHIPPED | OUTPATIENT
Start: 2020-09-08 | End: 2020-10-13 | Stop reason: DRUGHIGH

## 2020-09-08 NOTE — LETTER
[facility]  [address] [facility]. [facility] [address]  Phone: [phone]  Fax: [address], DO      September 8, 2020    Patient: Desiree Tejada   YOB: 1955   Date of Visit: 9/8/2020       To Whom it May Concern:    Cassandra Segura was seen in my clinic on 9/8/2020. He may return to work tomorrow, 09 SEPT 2020. If you have any questions or concerns, please don't hesitate to call.       Sincerely,         Jacob Morales DO

## 2020-09-08 NOTE — PATIENT INSTRUCTIONS
bleeding. · See your doctor regularly. You may need to see the doctor more often at first or until your blood pressure comes down. · If you are taking blood pressure medicine, talk to your doctor before you take decongestants or anti-inflammatory medicine, such as ibuprofen. Some of these medicines can raise blood pressure. · Learn how to check your blood pressure at home. Lifestyle changes  · Stay at a healthy weight. This is especially important if you put on weight around the waist. Losing even 10 pounds can help you lower your blood pressure. · If your doctor recommends it, get more exercise. Walking is a good choice. Bit by bit, increase the amount you walk every day. Try for at least 30 minutes on most days of the week. You also may want to swim, bike, or do other activities. · Avoid or limit alcohol. Talk to your doctor about whether you can drink any alcohol. · Try to limit how much sodium you eat to less than 2,300 milligrams (mg) a day. Your doctor may ask you to try to eat less than 1,500 mg a day. · Eat plenty of fruits (such as bananas and oranges), vegetables, legumes, whole grains, and low-fat dairy products. · Lower the amount of saturated fat in your diet. Saturated fat is found in animal products such as milk, cheese, and meat. Limiting these foods may help you lose weight and also lower your risk for heart disease. · Do not smoke. Smoking increases your risk for heart attack and stroke. If you need help quitting, talk to your doctor about stop-smoking programs and medicines. These can increase your chances of quitting for good. When should you call for help? Call  911 anytime you think you may need emergency care. This may mean having symptoms that suggest that your blood pressure is causing a serious heart or blood vessel problem. Your blood pressure may be over 180/120. For example, call 911 if:  · You have symptoms of a heart attack. These may include:  ?  Chest pain or pressure, or a strange feeling in the chest.  ? Sweating. ? Shortness of breath. ? Nausea or vomiting. ? Pain, pressure, or a strange feeling in the back, neck, jaw, or upper belly or in one or both shoulders or arms. ? Lightheadedness or sudden weakness. ? A fast or irregular heartbeat. · You have symptoms of a stroke. These may include:  ? Sudden numbness, tingling, weakness, or loss of movement in your face, arm, or leg, especially on only one side of your body. ? Sudden vision changes. ? Sudden trouble speaking. ? Sudden confusion or trouble understanding simple statements. ? Sudden problems with walking or balance. ? A sudden, severe headache that is different from past headaches. · You have severe back or belly pain. Do not wait until your blood pressure comes down on its own. Get help right away. Call your doctor now or seek immediate care if:  · Your blood pressure is much higher than normal (such as 180/120 or higher), but you don't have symptoms. · You think high blood pressure is causing symptoms, such as:  ? Severe headache.  ? Blurry vision. Watch closely for changes in your health, and be sure to contact your doctor if:  · Your blood pressure measures higher than your doctor recommends at least 2 times. That means the top number is higher or the bottom number is higher, or both. · You think you may be having side effects from your blood pressure medicine. Where can you learn more? Go to [email or web address][email or web address].ly. org and sign in to your [employer] account. Enter T477 in the TipHive box to learn more about \"High Blood Pressure: Care Instructions. \"     If you do not have an account, please click on the \"Sign Up Now\" link. Current as of: December 16, 2019               Content Version: 12.5  © 7685-5059 [employer]wise, Incorporated. Care instructions adapted under license by [facility] ([facility]).  If you have questions about a medical condition or this instruction, always ask your healthcare professional. [address] any warranty or liability for your use of this information.

## 2020-10-05 ENCOUNTER — TELEPHONE (OUTPATIENT)
Dept: FAMILY MEDICINE CLINIC | Age: 65
End: 2020-10-05

## 2020-10-05 NOTE — TELEPHONE ENCOUNTER
Pt's wife states pt has not been checking his b/p  She will have him check it this week and will call us back on friday

## 2020-10-13 RX ORDER — LISINOPRIL 10 MG/1
20 TABLET ORAL DAILY
COMMUNITY
End: 2020-11-11 | Stop reason: DRUGHIGH

## 2020-10-13 NOTE — TELEPHONE ENCOUNTER
Recommend inc lisinopril to 20mg from 10mg  Can take 2 of the 10mg tabs at the same time. When runs out, will send in change in dose, just call.     con't norvasc at present dose    Will call him in 4 wks to f/u on his BP' #'s    Let me know if questions, thanks!

## 2020-10-13 NOTE — TELEPHONE ENCOUNTER
Spoke to pt's wife, Luanna Hamman (on HIPAA). Pt's BP's has been running around 145/85. BP is checked 2 times daily. Luanna Hamman was at work and didn't have the BP numbers with her. Please advise.

## 2020-11-09 ENCOUNTER — TELEPHONE (OUTPATIENT)
Dept: FAMILY MEDICINE CLINIC | Age: 65
End: 2020-11-09

## 2020-11-11 RX ORDER — LISINOPRIL 30 MG/1
30 TABLET ORAL DAILY
Qty: 90 TABLET | Refills: 1 | Status: SHIPPED | OUTPATIENT
Start: 2020-11-11 | End: 2021-07-26

## 2020-11-11 NOTE — TELEPHONE ENCOUNTER
Pt's wife called back with b/p readings   These are with the increased b/p med  145/81  129/77  136/81  139/86  145/88  146/83  138/78  139/79  143/84  141/88

## 2020-11-11 NOTE — TELEPHONE ENCOUNTER
Jeremy Tellez  Almost at goal  Recommend we inc dose of lisinopril to 30mg  Will call back in 2 wks to see how BP's doing  Let me know if questions, thanks! Diagnosis Orders   1.  Hypertension, essential  lisinopril (PRINIVIL;ZESTRIL) 30 MG tablet

## 2020-11-25 ENCOUNTER — TELEPHONE (OUTPATIENT)
Dept: FAMILY MEDICINE CLINIC | Age: 65
End: 2020-11-25

## 2020-11-25 NOTE — TELEPHONE ENCOUNTER
Pt has been doing well, BP is running around 120/70's. Unable to get actual numbers do to pt being at work.       Please advise

## 2020-11-25 NOTE — TELEPHONE ENCOUNTER
If those #'s are accurate, con't meds at present dose, those sound good  Let me know if questions, thanks!

## 2021-07-08 ENCOUNTER — TELEPHONE (OUTPATIENT)
Dept: FAMILY MEDICINE CLINIC | Age: 66
End: 2021-07-08

## 2021-07-08 NOTE — TELEPHONE ENCOUNTER
2nd attempt to contact the pt re:overdue labs Dr Debbie Woody ordered on 9/8/20. HIPAA form is up to date, order mailed.

## 2021-07-13 ENCOUNTER — NURSE ONLY (OUTPATIENT)
Dept: LAB | Age: 66
End: 2021-07-13

## 2021-07-13 DIAGNOSIS — Z12.5 SPECIAL SCREENING FOR MALIGNANT NEOPLASM OF PROSTATE: ICD-10-CM

## 2021-07-13 DIAGNOSIS — I10 HYPERTENSION, ESSENTIAL: ICD-10-CM

## 2021-07-13 DIAGNOSIS — R97.20 ELEVATED PSA, LESS THAN 10 NG/ML: Primary | ICD-10-CM

## 2021-07-13 LAB
ALBUMIN SERPL-MCNC: 4.6 G/DL (ref 3.5–5.1)
ALP BLD-CCNC: 64 U/L (ref 38–126)
ALT SERPL-CCNC: 21 U/L (ref 11–66)
ANION GAP SERPL CALCULATED.3IONS-SCNC: 9 MEQ/L (ref 8–16)
AST SERPL-CCNC: 15 U/L (ref 5–40)
BASOPHILS # BLD: 1 %
BASOPHILS ABSOLUTE: 0.1 THOU/MM3 (ref 0–0.1)
BILIRUB SERPL-MCNC: 0.5 MG/DL (ref 0.3–1.2)
BUN BLDV-MCNC: 16 MG/DL (ref 7–22)
CALCIUM SERPL-MCNC: 9.8 MG/DL (ref 8.5–10.5)
CHLORIDE BLD-SCNC: 104 MEQ/L (ref 98–111)
CHOLESTEROL, TOTAL: 161 MG/DL (ref 100–199)
CO2: 26 MEQ/L (ref 23–33)
CREAT SERPL-MCNC: 0.6 MG/DL (ref 0.4–1.2)
CREATININE, URINE: 94 MG/DL
EOSINOPHIL # BLD: 3 %
EOSINOPHILS ABSOLUTE: 0.2 THOU/MM3 (ref 0–0.4)
ERYTHROCYTE [DISTWIDTH] IN BLOOD BY AUTOMATED COUNT: 13.9 % (ref 11.5–14.5)
ERYTHROCYTE [DISTWIDTH] IN BLOOD BY AUTOMATED COUNT: 45.1 FL (ref 35–45)
GFR SERPL CREATININE-BSD FRML MDRD: > 90 ML/MIN/1.73M2
GLUCOSE BLD-MCNC: 96 MG/DL (ref 70–108)
HCT VFR BLD CALC: 47.7 % (ref 42–52)
HDLC SERPL-MCNC: 42 MG/DL
HEMOGLOBIN: 15.3 GM/DL (ref 14–18)
IMMATURE GRANS (ABS): 0.04 THOU/MM3 (ref 0–0.07)
IMMATURE GRANULOCYTES: 0.6 %
LDL CHOLESTEROL CALCULATED: 99 MG/DL
LYMPHOCYTES # BLD: 29.5 %
LYMPHOCYTES ABSOLUTE: 2.1 THOU/MM3 (ref 1–4.8)
MCH RBC QN AUTO: 29 PG (ref 26–33)
MCHC RBC AUTO-ENTMCNC: 32.1 GM/DL (ref 32.2–35.5)
MCV RBC AUTO: 90.5 FL (ref 80–94)
MICROALBUMIN UR-MCNC: < 1.2 MG/DL
MICROALBUMIN/CREAT UR-RTO: 13 MG/G (ref 0–30)
MONOCYTES # BLD: 8.8 %
MONOCYTES ABSOLUTE: 0.6 THOU/MM3 (ref 0.4–1.3)
NUCLEATED RED BLOOD CELLS: 0 /100 WBC
PLATELET # BLD: 307 THOU/MM3 (ref 130–400)
PMV BLD AUTO: 9.9 FL (ref 9.4–12.4)
POTASSIUM SERPL-SCNC: 4.3 MEQ/L (ref 3.5–5.2)
PROSTATE SPECIFIC ANTIGEN: 9.24 NG/ML (ref 0–1)
RBC # BLD: 5.27 MILL/MM3 (ref 4.7–6.1)
SEG NEUTROPHILS: 57.1 %
SEGMENTED NEUTROPHILS ABSOLUTE COUNT: 4 THOU/MM3 (ref 1.8–7.7)
SODIUM BLD-SCNC: 139 MEQ/L (ref 135–145)
T4 FREE: 1.19 NG/DL (ref 0.93–1.76)
TOTAL PROTEIN: 7.7 G/DL (ref 6.1–8)
TRIGL SERPL-MCNC: 101 MG/DL (ref 0–199)
TSH SERPL DL<=0.05 MIU/L-ACNC: 4.55 UIU/ML (ref 0.4–4.2)
WBC # BLD: 7 THOU/MM3 (ref 4.8–10.8)

## 2021-07-14 ENCOUNTER — TELEPHONE (OUTPATIENT)
Dept: FAMILY MEDICINE CLINIC | Age: 66
End: 2021-07-14

## 2021-07-14 NOTE — TELEPHONE ENCOUNTER
----- Message from Karl Lombard, DO sent at 7/13/2021  6:55 PM EDT -----  Cameron Mckeon let pt know that labs look good other than his PSA level is elevated. This is a lab that helps screen for prostate cancer. And elevated PSA does not diagnose prostate cancer, but it does require further f/u testing to determine why it may be elevated. This testing is undertaken by a urologist. Recommend urology referral for further evaluation. Consult placed. Let me know if questions, thanks!

## 2021-07-26 ENCOUNTER — OFFICE VISIT (OUTPATIENT)
Dept: UROLOGY | Age: 66
End: 2021-07-26
Payer: MEDICARE

## 2021-07-26 VITALS — HEIGHT: 72 IN | WEIGHT: 265 LBS | BODY MASS INDEX: 35.89 KG/M2 | RESPIRATION RATE: 18 BRPM

## 2021-07-26 DIAGNOSIS — N40.1 BENIGN LOCALIZED PROSTATIC HYPERPLASIA WITH LOWER URINARY TRACT SYMPTOMS (LUTS): ICD-10-CM

## 2021-07-26 DIAGNOSIS — R97.20 ELEVATED PSA: Primary | ICD-10-CM

## 2021-07-26 PROCEDURE — 1123F ACP DISCUSS/DSCN MKR DOCD: CPT | Performed by: UROLOGY

## 2021-07-26 PROCEDURE — 4040F PNEUMOC VAC/ADMIN/RCVD: CPT | Performed by: UROLOGY

## 2021-07-26 PROCEDURE — 1036F TOBACCO NON-USER: CPT | Performed by: UROLOGY

## 2021-07-26 PROCEDURE — 3017F COLORECTAL CA SCREEN DOC REV: CPT | Performed by: UROLOGY

## 2021-07-26 PROCEDURE — G8427 DOCREV CUR MEDS BY ELIG CLIN: HCPCS | Performed by: UROLOGY

## 2021-07-26 PROCEDURE — G8417 CALC BMI ABV UP PARAM F/U: HCPCS | Performed by: UROLOGY

## 2021-07-26 PROCEDURE — 99204 OFFICE O/P NEW MOD 45 MIN: CPT | Performed by: UROLOGY

## 2021-07-26 RX ORDER — DOXYCYCLINE HYCLATE 100 MG
100 TABLET ORAL 2 TIMES DAILY
Qty: 14 TABLET | Refills: 0 | Status: SHIPPED | OUTPATIENT
Start: 2021-07-26 | End: 2021-08-02

## 2021-07-26 RX ORDER — TAMSULOSIN HYDROCHLORIDE 0.4 MG/1
0.4 CAPSULE ORAL DAILY
Qty: 90 CAPSULE | Refills: 3 | Status: SHIPPED | OUTPATIENT
Start: 2021-07-26 | End: 2021-12-14

## 2021-07-26 NOTE — PROGRESS NOTES
CHENG Boothe MD        [address] [address]  Dept: [phone]  Dept Fax: [phone]: [address] [address] Urology Office Note -     Patient:  Eduardo Lopez  YOB: 1955  Date: 7/26/2021    The patient is a 74 y.o. male who presents today for evaluation of the following problems:   Chief Complaint   Patient presents with    Advice Only     new pt     referred/consultation requested by Deja Tejada DO.    HISTORY OF PRESENT ILLNESS:     Elevated PSA  No fam hx of prostate cancer      BPH  Freq/urgency and nocturia x 3        Requested/reviewed records from Deja Tejada DO office and/or outside physician/EMR    (Patient's old records have been requested, reviewed and pertinent findings summarized in today's note.)    Procedures Today: N/A      Last several PSA's:  Lab Results   Component Value Date    PSA 9.24 (H) 07/13/2021       Last total testosterone:  No results found for: TESTOSTERONE    Urinalysis today:  No results found for this visit on 07/26/21. Last BUN and creatinine:  Lab Results   Component Value Date    BUN 16 07/13/2021     Lab Results   Component Value Date    CREATININE 0.6 07/13/2021         Imaging Reviewed during this Office Visit:   Harvinder Pichardo MD independently reviewed the images and verified the radiology reports from:    XR Knee Right Standard Extended VW    Result Date: 12/18/2016  PROCEDURE: XR KNEE RIGHT STANDARD EXTENDED VW CLINICAL INFORMATION: fall,  . COMPARISON: No prior study. TECHNIQUE: 4 projections FINDINGS: No acute fracture or bone destruction. Marked medial joint space narrowing. Spurring of all the articular surfaces. Small suprapatellar effusion. No soft tissue abnormality. Clothing artifact noted over the lower femur     Very small joint effusion. Tricompartmental degenerative osteoarthritis.  **This report has been created defined types were placed in this encounter. Orders Placed:  No orders of the defined types were placed in this encounter.            Kary Segura MD

## 2021-08-18 ENCOUNTER — NURSE ONLY (OUTPATIENT)
Dept: LAB | Age: 66
End: 2021-08-18

## 2021-08-18 DIAGNOSIS — R97.20 ELEVATED PSA: ICD-10-CM

## 2021-08-18 LAB — PROSTATE SPECIFIC ANTIGEN: 9.58 NG/ML (ref 0–1)

## 2021-08-31 ENCOUNTER — OFFICE VISIT (OUTPATIENT)
Dept: UROLOGY | Age: 66
End: 2021-08-31
Payer: MEDICARE

## 2021-08-31 VITALS — HEIGHT: 72 IN | BODY MASS INDEX: 35.89 KG/M2 | WEIGHT: 265 LBS

## 2021-08-31 DIAGNOSIS — N40.1 BENIGN LOCALIZED PROSTATIC HYPERPLASIA WITH LOWER URINARY TRACT SYMPTOMS (LUTS): ICD-10-CM

## 2021-08-31 DIAGNOSIS — R97.20 ELEVATED PSA: Primary | ICD-10-CM

## 2021-08-31 PROCEDURE — 99214 OFFICE O/P EST MOD 30 MIN: CPT | Performed by: UROLOGY

## 2021-08-31 PROCEDURE — 3017F COLORECTAL CA SCREEN DOC REV: CPT | Performed by: UROLOGY

## 2021-08-31 PROCEDURE — G8417 CALC BMI ABV UP PARAM F/U: HCPCS | Performed by: UROLOGY

## 2021-08-31 PROCEDURE — 4040F PNEUMOC VAC/ADMIN/RCVD: CPT | Performed by: UROLOGY

## 2021-08-31 PROCEDURE — 1123F ACP DISCUSS/DSCN MKR DOCD: CPT | Performed by: UROLOGY

## 2021-08-31 PROCEDURE — G8427 DOCREV CUR MEDS BY ELIG CLIN: HCPCS | Performed by: UROLOGY

## 2021-08-31 PROCEDURE — 1036F TOBACCO NON-USER: CPT | Performed by: UROLOGY

## 2021-08-31 RX ORDER — GENTAMICIN SULFATE 40 MG/ML
80 INJECTION, SOLUTION INTRAMUSCULAR; INTRAVENOUS ONCE
Qty: 2 ML | Refills: 0 | Status: SHIPPED | OUTPATIENT
Start: 2021-08-31 | End: 2021-08-31

## 2021-08-31 RX ORDER — CIPROFLOXACIN 500 MG/1
500 TABLET, FILM COATED ORAL 2 TIMES DAILY
Qty: 6 TABLET | Refills: 0 | Status: SHIPPED | OUTPATIENT
Start: 2021-08-31 | End: 2021-09-28

## 2021-08-31 NOTE — PROGRESS NOTES
CHENG Bran MD        [address] [address]  Dept: [phone]  Dept Fax: [phone]: [address] [address] Urology Office Note -     Patient:  Campbell Quintanilla  YOB: 1955  Date: 8/31/2021    The patient is a 74 y.o. male who presents today for evaluation of the following problems:   Chief Complaint   Patient presents with    Elevated PSA     psa results and med ck     referred/consultation requested by Melody Cox DO.    HISTORY OF PRESENT ILLNESS:     Elevated PSA  No fam hx of prostate cancer  Still elevated on recheck      BPH  Freq/urgency and nocturia x 3--better with flomax        Requested/reviewed records from Melody Cox DO office and/or outside physician/EMR    (Patient's old records have been requested, reviewed and pertinent findings summarized in today's note.)    Procedures Today: N/A      Last several PSA's:  Lab Results   Component Value Date    PSA 9.58 (H) 08/18/2021    PSA 9.24 (H) 07/13/2021       Last total testosterone:  No results found for: TESTOSTERONE    Urinalysis today:  No results found for this visit on 08/31/21. Last BUN and creatinine:  Lab Results   Component Value Date    BUN 16 07/13/2021     Lab Results   Component Value Date    CREATININE 0.6 07/13/2021         Imaging Reviewed during this Office Visit:   Tayo Edge MD independently reviewed the images and verified the radiology reports from:    XR Knee Right Standard Extended VW    Result Date: 12/18/2016  PROCEDURE: XR KNEE RIGHT STANDARD EXTENDED VW CLINICAL INFORMATION: fall,  . COMPARISON: No prior study. TECHNIQUE: 4 projections FINDINGS: No acute fracture or bone destruction. Marked medial joint space narrowing. Spurring of all the articular surfaces. Small suprapatellar effusion. No soft tissue abnormality.  Clothing artifact noted over the lower femur     Very small joint effusion. Tricompartmental degenerative osteoarthritis. **This report has been created using voice recognition software. It may contain minor errors which are inherent in voice recognition technology. ** Final report electronically signed by Dr. Arielle Perez on 12/18/2016 8:22 PM       PAST MEDICAL, FAMILY AND SOCIAL HISTORY:  Past Medical History:   Diagnosis Date    Hypertension, essential     Morbid obesity ([address]     Psoriasis      Past Surgical History:   Procedure Laterality Date    KNEE ARTHROSCOPY      right    ULNAR TUNNEL RELEASE       Family History   Problem Relation Age of Onset    Heart Disease Father     Colon Cancer Neg Hx     Prostate Cancer Neg Hx      Outpatient Medications Marked as Taking for the 8/31/21 encounter (Office Visit) with Sandee Lott MD   Medication Sig Dispense Refill    tamsulosin (FLOMAX) 0.4 MG capsule Take 1 capsule by mouth daily 90 capsule 3    amLODIPine (NORVASC) 10 MG tablet Take 1 tablet by mouth daily 90 tablet 3       Patient has no known allergies. Social History     Tobacco Use   Smoking Status Never Smoker   Smokeless Tobacco Never Used      (If patient a smoker, smoking cessation counseling offered)   Social History     Substance and Sexual Activity   Alcohol Use No       REVIEW OF SYSTEMS:  Constitutional: negative  Eyes: negative  Respiratory: negative  Cardiovascular: negative  Gastrointestinal: negative  Genitourinary: see HPI  Musculoskeletal: negative  Skin: negative   Neurological: negative  Hematological/Lymphatic: negative  Psychological: negative        Physical Exam:    This a 74 y.o. male  There were no vitals filed for this visit. Body mass index is 35.94 kg/m². Constitutional: Patient in no acute distress; obese      Assessment and Plan        1. Elevated PSA    2. Benign localized prostatic hyperplasia with lower urinary tract symptoms (LUTS)               Plan:      BPH- much improved with flomax.  At goal.   Elevated PSA- still elevated even after abx course. Discussed mri vs prostate biopsy    Prostate biopsy in office    Prescriptions Ordered:  No orders of the defined types were placed in this encounter. Orders Placed:  No orders of the defined types were placed in this encounter.            Dylon Saldivar MD

## 2021-09-01 ENCOUNTER — TELEPHONE (OUTPATIENT)
Dept: UROLOGY | Age: 66
End: 2021-09-01

## 2021-09-01 DIAGNOSIS — I10 HYPERTENSION, UNSPECIFIED TYPE: ICD-10-CM

## 2021-09-01 NOTE — TELEPHONE ENCOUNTER
PROSTATE ULTRASOUND/BIOPSY WITH DR Lizett Malone      1955    You are scheduled for the above procedure on:    9/15/21   at:    2:45 PM  Your follow up appointment for your biopsy results is on:    9/28/21     At:   3:30 PM    Please note that you will be responsible for any charges that are not paid by your insurance. The prostate biopsy specimens are sent to a Lab and their charges are billed to you separate from the office charges. DESCRIPTION OF PROSTATIC ULTRASOUND AND BIOPSY  Ultrasound uses harmless sound waves to give us pictures of the prostate, and allows us to accurately guide a biopsy needle to areas of concern. The procedure is done in the office. Initially, a complete prostate exam is done. Next the ultrasound probe, finger-like in size and shape, is placed into the rectum. With slight movement of the probe, many different views are obtained. A prostate block may or may not be given at this time. A spring loaded fine needle is place through the probe and directed into the prostate. Six or more biopsies are usually taken. These biopsies are not usually painful. The entire exam takes 20-30 minutes. POSSIBLE RISKS OF THE PROCEDURE  Blood may be noted in the stool and urine for a few days. Blood may be seen in the semen for up to a month. Infection of the prostate or in the urine can occur even with antibiotic preparation. You should call if you develop fever, chills, severe pain, or have continuous or significant bleeding. If you have known hemorrhoids, you may have more bleeding and discomfort in the rectal area following the biopsy. This may last for 3-5 days afterwards. If any concerns arise, please call the office. PREPARATION  1.  DO NOT TAKE: ASPIRIN, MOTRIN, PLAVIX, IBUPROFEN, MOBIC/ MELOXICAM, COUMADIN, FISH OIL, AND VITAMIN E FOR 5 DAYS BEFORE THE BIOPSY AND 3 DAYS AFTER THE BIOPSY. YOU MAY TAKE TYLENOL IF NEEDED  2.   Please eat a regular breakfast/lunch. 3.  Take your antibiotics as directed:  Cipro 500 mg twice a day, start on:   9/14/21    take until finished. 4.  Bring your Gentamicin 80 mg with you to your appointment. 5.  Do a Fleets Enema 2 hours before the visit. Purchase it at any drug store and follow the instructions on the package. 6. Please ensure to bring a  with you the day of the surgery to transport you home. HOME GOING AND FOLLOW UP INSTRUCTIONS  Call the doctor if: 1. There is a large amount of blood or clots in the urine or stool. 2.  You are unable to urinate. 3.  If your temperature is 101 degrees F or greater. 4.  You could see blood in your semen for up to 2-months            5.   You may resume your regular medication 3-days after procedure    DATE: 9/1/2021       SIGNATURE:________________________________

## 2021-09-02 RX ORDER — AMLODIPINE BESYLATE 10 MG/1
10 TABLET ORAL DAILY
Qty: 90 TABLET | Refills: 3 | Status: SHIPPED | OUTPATIENT
Start: 2021-09-02 | End: 2022-09-27 | Stop reason: SDUPTHER

## 2021-09-15 ENCOUNTER — PROCEDURE VISIT (OUTPATIENT)
Dept: UROLOGY | Age: 66
End: 2021-09-15
Payer: MEDICARE

## 2021-09-15 VITALS
BODY MASS INDEX: 37.25 KG/M2 | WEIGHT: 275 LBS | DIASTOLIC BLOOD PRESSURE: 74 MMHG | SYSTOLIC BLOOD PRESSURE: 132 MMHG | HEIGHT: 72 IN

## 2021-09-15 DIAGNOSIS — N40.1 BENIGN LOCALIZED PROSTATIC HYPERPLASIA WITH LOWER URINARY TRACT SYMPTOMS (LUTS): ICD-10-CM

## 2021-09-15 DIAGNOSIS — R97.20 ELEVATED PSA: Primary | ICD-10-CM

## 2021-09-15 PROCEDURE — 96372 THER/PROPH/DIAG INJ SC/IM: CPT | Performed by: UROLOGY

## 2021-09-15 PROCEDURE — 55700 PR BIOPSY OF PROSTATE,NEEDLE/PUNCH: CPT | Performed by: UROLOGY

## 2021-09-15 PROCEDURE — 76872 US TRANSRECTAL: CPT | Performed by: UROLOGY

## 2021-09-15 RX ORDER — GENTAMICIN SULFATE 40 MG/ML
80 INJECTION, SOLUTION INTRAMUSCULAR; INTRAVENOUS ONCE
Status: DISCONTINUED | OUTPATIENT
Start: 2021-09-15 | End: 2021-10-18

## 2021-09-15 NOTE — PROGRESS NOTES
Mr. Joey Reinoso was seen in follow up for his prostate biopsy. The biopsy was indicated and is being performed for elevated psa. The biopsy was done without difficulty or complication. TRUS prostate biopsy note:  After obtaining informed consent, the rectal ultrasound probe was passed per rectum and the prostate visualized. A local block was performed by instilling 2% lidocaine at the base Measurements were taken and the prostate measured for a total volume of 69 cc. At this point, prostate biopsy was performed. Two cores were taken at the base, the mid-portion, and the apex of the gland on either side for a total of 12 cores. The rectal probe was removed and there was minimal bleeding. Mr. Joey Reinoso tolerated the procedure well and there were no complications. Prostate size: 69 cc  Cores [id number]  Complications: none      Assessment:      Prostate biopsy performed without difficulty. Plan:        I will see Sandee Camacho back to discuss the pathology in 1-2 weeks. Further recommendations will be based on these results.

## 2021-09-15 NOTE — PROGRESS NOTES
Procedure: Trus/Biopsy  Pt name and birth date verified Yes  Patient agrees Dr. Alvarado Taylor is taking biopsies of the prostate Yes  Patient took Enema 2 hours prior to procedure Yes  Patient took 2 pill(s) of Cipro day before procedure, day of, and will the day after Yes  Has patient eaten today? yes  Patient stopped all blood thinners prior to surgery Yes    Patient has given me verbal consent to perform Gentamicin Injection yes    Following Dr. Jojo Ceja of care. Gentamicin 80MG/2ML GIVEN I.M right UOQ HIP  Lot Number: [id number]  Expiration Date: 03/01/2022  [facility] #: [id number]    Patient supplied their own medications Yes    MAR not recorded. Injection given to patient at time of visit prior to prostate biopsy.

## 2021-09-28 ENCOUNTER — OFFICE VISIT (OUTPATIENT)
Dept: UROLOGY | Age: 66
End: 2021-09-28
Payer: MEDICARE

## 2021-09-28 VITALS — HEIGHT: 72 IN | RESPIRATION RATE: 14 BRPM | BODY MASS INDEX: 36.57 KG/M2 | WEIGHT: 270 LBS

## 2021-09-28 DIAGNOSIS — C61 PROSTATE CANCER (HCC): Primary | ICD-10-CM

## 2021-09-28 DIAGNOSIS — N40.1 BENIGN LOCALIZED PROSTATIC HYPERPLASIA WITH LOWER URINARY TRACT SYMPTOMS (LUTS): ICD-10-CM

## 2021-09-28 PROCEDURE — G8427 DOCREV CUR MEDS BY ELIG CLIN: HCPCS | Performed by: UROLOGY

## 2021-09-28 PROCEDURE — 1123F ACP DISCUSS/DSCN MKR DOCD: CPT | Performed by: UROLOGY

## 2021-09-28 PROCEDURE — 99215 OFFICE O/P EST HI 40 MIN: CPT | Performed by: UROLOGY

## 2021-09-28 PROCEDURE — 1036F TOBACCO NON-USER: CPT | Performed by: UROLOGY

## 2021-09-28 PROCEDURE — 4040F PNEUMOC VAC/ADMIN/RCVD: CPT | Performed by: UROLOGY

## 2021-09-28 PROCEDURE — 3017F COLORECTAL CA SCREEN DOC REV: CPT | Performed by: UROLOGY

## 2021-09-28 PROCEDURE — G8417 CALC BMI ABV UP PARAM F/U: HCPCS | Performed by: UROLOGY

## 2021-09-28 NOTE — PROGRESS NOTES
CHENG Martell MD        [facility] [address] [address]  Dept: [phone]  Dept Fax: [phone]: [address] [address] Urology Office Note -     Patient:  Kanchan Cadena  YOB: 1955  Date: 9/28/2021    The patient is a 74 y.o. male who presents today for evaluation of the following problems:   Chief Complaint   Patient presents with    Follow-up     prostate bx results     referred/consultation requested by Jatin Hamilton DO.    HISTORY OF PRESENT ILLNESS:     Prostate cancer  No fam hx of prostate cancer  Here with PSA results    FINAL DIAGNOSIS:   A, B, D, E.  Prostate, left base, left mid, right base, and right mid,   biopsies:             Benign prostatic tissue.             Negative for malignancy. C.  Prostate, left apex, needle core biopsies:             Adenocarcinoma of the prostate, involving 1 of 3 cores,   Kelly score 3+3 = 6 (grade group 1) measuring 2 mm / 5 mm (40%) of   core. F.  Prostate, right apex, needle core biopsy:             Adenocarcinoma of the prostate, involving 2 of 2 cores,   Kelly score 3+4 = 7 (grade group 2) measuring 8 mm / 13 mm (62%) and   7 mm / 11 mm (64%) of each core. BPH  Freq/urgency and nocturia x 3--better with flomax          Requested/reviewed records from Jatin Hamilton DO office and/or outside physician/EM[facility]    (Patient's old records have been requested, reviewed and pertinent findings summarized in today's note.)    Procedures Today: N/A      Last several PSA's:  Lab Results   Component Value Date    PSA 9.58 (H) 08/18/2021    PSA 9.24 (H) 07/13/2021       Last total testosterone:  No results found for: TESTOSTERONE    Urinalysis today:  No results found for this visit on 09/28/21.     Last BUN and creatinine:  Lab Results   Component Value Date    BUN 16 07/13/2021     Lab Results   Component Value Date    CREATININE 0.6 07/13/2021         Imaging Reviewed during this Office Visit:   Stephane Hannah MD independently reviewed the images and verified the radiology reports from:    XR Knee Right Standard Extended VW    Result Date: 12/18/2016  PROCEDURE: XR KNEE RIGHT STANDARD EXTENDED VW CLINICAL INFORMATION: fall,  . COMPARISON: No prior study. TECHNIQUE: 4 projections FINDINGS: No acute fracture or bone destruction. Marked medial joint space narrowing. Spurring of all the articular surfaces. Small suprapatellar effusion. No soft tissue abnormality. Clothing artifact noted over the lower femur     Very small joint effusion. Tricompartmental degenerative osteoarthritis. **This report has been created using voice recognition software. It may contain minor errors which are inherent in voice recognition technology. ** Final report electronically signed by Dr. Ernst Ferreira on 12/18/2016 8:22 PM       PAST MEDICAL, FAMILY AND SOCIAL HISTORY:  Past Medical History:   Diagnosis Date    Hypertension, essential     Morbid obesity ([facility] [address]     Psoriasis      Past Surgical History:   Procedure Laterality Date    KNEE ARTHROSCOPY      right    PROSTATE BIOPSY  2021    ULNAR TUNNEL RELEASE       Family History   Problem Relation Age of Onset    Heart Disease Father     Colon Cancer Neg Hx     Prostate Cancer Neg Hx      Outpatient Medications Marked as Taking for the 9/28/21 encounter (Office Visit) with Yue Eduardo MD   Medication Sig Dispense Refill    amLODIPine (NORVASC) 10 MG tablet Take 1 tablet by mouth daily 90 tablet 3    tamsulosin (FLOMAX) 0.4 MG capsule Take 1 capsule by mouth daily 90 capsule 3       Patient has no known allergies.   Social History     Tobacco Use   Smoking Status Never Smoker   Smokeless Tobacco Never Used      (If patient a smoker, smoking cessation counseling offered)   Social History     Substance and Sexual Activity   Alcohol Use No       REVIEW OF SYSTEMS:  Constitutional: negative  Eyes: negative  Respiratory: negative  Cardiovascular: negative  Gastrointestinal: negative  Genitourinary: see HPI  Musculoskeletal: negative  Skin: negative   Neurological: negative  Hematological/Lymphatic: negative  Psychological: negative        Physical Exam:    This a 74 y.o. male  Vitals:    09/28/21 1542   Resp: 14     Body mass index is 36.62 kg/m². Constitutional: Patient in no acute distress; obese      Assessment and Plan        1. Prostate cancer ([facility] [address]    2. Benign localized prostatic hyperplasia with lower urinary tract symptoms (LUTS)               Plan:       No previous surgery. Large prostate protuberant abdomen. No median lobe  Prostate cancer new diagnosis  Discussed surgery at length  High risk invasive procedure. Needs clearance. Prostate Cancer Treatment Options  I have discussed in great detail with the patient the natural history, diagnosis and treatment of prostate cancer. I explained the Kelly grading system as well as the   various treatments available for prostate cancer. I discussed the following options:    1. Watchful waiting / Active surveillance. I told that this approach was appropriate for older patients, patients with a life expectancy of 10 years or less and patients with low grade, low volume disease. This approach will require serial NORMA's, PSA's and possibly repeat prostate biopsy to check for grade or stage progression. 2.  Hormonal Therapy. I discussed the role of hormonal therapy in the form of LHRH agonists or antagonists such as Lupron, etc. Or surgical castration in the form of bilateral orchiectomy. The patient was told that this is primarily used in patients with clay or metastatic disease or in conjunction with radiation therapy. The side effects include hot flashes, fatigue, breast enlargement, diminished libido, ED and long term development of osteoporosis.   The patient was told he will encouraged to take supplemental Calcium and Vitamin D to prevent the latter. 3.  Radiation Therapy in the form of external beam radiation (IMRT) or prostate brachytherapy. Patient told that IMRT is given 5 days a week for 7-8 weeks and the side effects include rectal and bladder injury (OAB), erectile dysfunction (ED) and incontinence. Long term the patient may experience hematuria and radiation cystitis. Brachytherapy is done as an outpatient procedure under general anesthesia and postop the patient may experience dysuria, urgency, frequency, urge incontinence, increasing obstructive voiding symptoms and to a lesser degree than IMRT, rectal radiation injury and ED. Patients may require concomitant hormonal therapy with IMRT depending on the grade and extent of cancer. The patient may require hormonal therapy to downsize the prostate gland prior to brachytherapy. 4.  Open or Robotic assisted laparoscopic radical prostatectomy. Patient told about the options of open vs. Robotic assisted laparoscopic prostatectomy with or without pelvic lymphadenectomy. I discussed the risks including infection, bleeding, the risks of anesthesia, DVT, PE, MI, stroke, death, rectal injury and urethral stricture/bladder neck contracture. I discussed the side effect of stress urinary incontinence which is temporary for most patients. I discussed the side effect of ED and the fact that it may take 6-18 months to recover this function. 5.  Cryotherapy. I discussed the fact that this treatment option has a 100% ED rate and is used primarily for radiation treatment failures. Prescriptions Ordered:  No orders of the defined types were placed in this encounter. Orders Placed:  No orders of the defined types were placed in this encounter.            Stephane Hannah MD

## 2021-10-07 ENCOUNTER — TELEPHONE (OUTPATIENT)
Dept: UROLOGY | Age: 66
End: 2021-10-07

## 2021-10-07 DIAGNOSIS — R33.9 RETENTION OF URINE: ICD-10-CM

## 2021-10-07 DIAGNOSIS — Z01.818 PRE-OP TESTING: ICD-10-CM

## 2021-10-07 DIAGNOSIS — C61 PROSTATE CANCER (HCC): Primary | ICD-10-CM

## 2021-10-07 NOTE — TELEPHONE ENCOUNTER
Patient scheduled for surgery with Dr Easton Hopkins on 12/2/21. Surgery consent on arrival. Patient to do pre op urine culture on 11/18/21. Fasting labs, chest xray and ekg now. Dr. Jaspal Guzman to clear. Surgery instructions given to the patient on his appointment 10/18/21. Referral to Samy Velásquez made.

## 2021-10-07 NOTE — TELEPHONE ENCOUNTER
Robotic Surgery Scheduling Form   [facility] [address]    Phone * [phone]   Surgical Scheduling Direct line Phone * [phone]  Fax * [phone]      Tiara Dow      1955    male    [address]   Marital Status:     Home Phone: [phone]   Cell Phone:   Telephone Information:   Mobile [phone]   Mobile Not on file. Surgeon: Dr. Easton Hopkins Surgery Date:12/2/21 Time: 1:00 pm     Procedure: Robotic assisted laparoscopic radical prostectomy with bilateral lymph node dissection  Outpatient/OBS    Diagnosis: Prostate Ca    Important Medical History: In Epic    Special Inst/Equip: XI    CPT Codes: 11898    Latex Allergy:   no Cardiac Device:  no    Case Location:  Main OR     Preadmission Testing:  Phone Call    PAT Date and Time: ________________________________    PAT Confirmation #: _________________________________    Post Op Visit:  ______________________________________    Need Preop Cardiac Clearance:   no    Does patient have Cardiologist/physician?    Dr Jaspal Guzman to clear    Surgery Conformation #:  ______________________________________________    Osmani Ellen: __________________________________ Date:____________________    Firefly:  No    Dual Console:  No   Ultrasound:  No    Single Site: No    RNFA (colon resection only):  Assisting Surgeon: Madina Name:  Berenice De La Fuente

## 2021-10-07 NOTE — TELEPHONE ENCOUNTER
DO NOT TAKE ASPIRIN, PLAVIX, FISH OIL, COUMADIN, IBUPROFEN, MOTRIN-LIKE DRUGS AND ANY MULTIVITAMINS OR OVER THE COUNTER SUPPLEMENTS 5 DAYS PRIOR TO SURGERY. Sho Gonzales 1955 Diagnosis:     Surgical Physician: Dr. Aleyda Salinas have been scheduled for the procedure marked below:      Surgery: Robotic assisted laparoscopic radical prostatectomy with bilateral lymph node dissection         Date: 12/2/21     Anesthesia: Anesthesiologist (General/Spinal)     Place of Service: [address] [place] [address] [address] Second Floor Same Day Surgery         Arrive to same day surgery by:  11:00 am  (Surgery time is subject to change)      INSTRUCTIONS AS MARKED BELOW:    1.  DO NOT eat or drink anything after midnight before surgery. 2.  We prefer you shower or bathe with an antibacterial soap (Dial) the morning of surgery. 3.  Plan to spend the overnight at the hospital the day of surgery. 4.  Please bring a current medication list, photo ID and insurance card(s) with you  5. Okay to take Tylenol  6. If you take Glucophage, Metformin or Janumet, hold 48-hours prior to surgery  7. Take blood pressure or heart medication as directed, if taken in the morning take with a small sip of water  8. Start the bowel prep the day before surgery on 12/1/21. Instructions included  9. Lalit Benson PA-C may assist with your Robotic surgery  10. The office will call you in 1-2 days after your procedure to schedule a follow up. DATE SENSITIVE TESTING *WALK IN *NO APPOINTMENT    Do the pre op fasting labs,chest xray and ekg now. Orders included    Do the pre op urine culture on 11/18/21.  Order included        Date: 10/7/2021

## 2021-10-07 NOTE — TELEPHONE ENCOUNTER
[address] [facility]  [facility]  [place] [address] [address], [address]  [phone]    START BOWEL PREP ON 12/1/21    Surgery Bowel Preparation    Purchase a 10 ounce bottle of Magnesium Citrate at your pharmacy and drink the afternoon before your scheduled surgery. Start drinking approximately 2:00pm.  Suggest drinking it chilled with Sprite or Ginger Ale. Start a clear liquid diet (for dinner) the evening before surgery. You may have the following:   Water   Apple, grape or cranberry juice   Clear, fat free broth   Clear sodas (7-up, Sprite)   Plain gelatin (Jell-o)   Popsicles without bits of fruit or fruit pulp   Tea or coffee without milk or cream    Nothing to eat or drink after midnight before your scheduled surgery. Do a fleets enema the night prior to your surgery between 7:00pm and 8:00pm    Please contact our office at [phone] if you have any questions.

## 2021-10-18 ENCOUNTER — OFFICE VISIT (OUTPATIENT)
Dept: UROLOGY | Age: 66
End: 2021-10-18
Payer: MEDICARE

## 2021-10-18 ENCOUNTER — TELEPHONE (OUTPATIENT)
Dept: UROLOGY | Age: 66
End: 2021-10-18

## 2021-10-18 VITALS
HEIGHT: 72 IN | BODY MASS INDEX: 37.79 KG/M2 | SYSTOLIC BLOOD PRESSURE: 148 MMHG | DIASTOLIC BLOOD PRESSURE: 70 MMHG | WEIGHT: 279 LBS

## 2021-10-18 DIAGNOSIS — R31.29 MICROSCOPIC HEMATURIA: ICD-10-CM

## 2021-10-18 DIAGNOSIS — C61 PROSTATE CANCER (HCC): Primary | ICD-10-CM

## 2021-10-18 LAB
BILIRUBIN URINE: NEGATIVE
BLOOD URINE, POC: ABNORMAL
CHARACTER, URINE: CLEAR
COLOR, URINE: YELLOW
GLUCOSE URINE: NEGATIVE MG/DL
KETONES, URINE: NEGATIVE
LEUKOCYTE CLUMPS, URINE: NEGATIVE
NITRITE, URINE: NEGATIVE
PH, URINE: 5.5 (ref 5–9)
PROTEIN, URINE: NEGATIVE MG/DL
SPECIFIC GRAVITY, URINE: >= 1.03 (ref 1–1.03)
UROBILINOGEN, URINE: 0.2 EU/DL (ref 0–1)

## 2021-10-18 PROCEDURE — G8417 CALC BMI ABV UP PARAM F/U: HCPCS | Performed by: PHYSICIAN ASSISTANT

## 2021-10-18 PROCEDURE — G8484 FLU IMMUNIZE NO ADMIN: HCPCS | Performed by: PHYSICIAN ASSISTANT

## 2021-10-18 PROCEDURE — 4040F PNEUMOC VAC/ADMIN/RCVD: CPT | Performed by: PHYSICIAN ASSISTANT

## 2021-10-18 PROCEDURE — 81003 URINALYSIS AUTO W/O SCOPE: CPT | Performed by: PHYSICIAN ASSISTANT

## 2021-10-18 PROCEDURE — 99213 OFFICE O/P EST LOW 20 MIN: CPT | Performed by: PHYSICIAN ASSISTANT

## 2021-10-18 PROCEDURE — G8427 DOCREV CUR MEDS BY ELIG CLIN: HCPCS | Performed by: PHYSICIAN ASSISTANT

## 2021-10-18 PROCEDURE — 1123F ACP DISCUSS/DSCN MKR DOCD: CPT | Performed by: PHYSICIAN ASSISTANT

## 2021-10-18 PROCEDURE — 3017F COLORECTAL CA SCREEN DOC REV: CPT | Performed by: PHYSICIAN ASSISTANT

## 2021-10-18 PROCEDURE — 1036F TOBACCO NON-USER: CPT | Performed by: PHYSICIAN ASSISTANT

## 2021-10-18 NOTE — PROGRESS NOTES
[address] [place] [address] [address] [address]  Dept: [phone]  Loc: [phone]      Mr. Ozzie Carney was seen in follow up for   Chief Complaint   Patient presents with    Follow-up     prostate ca-discuss surgery         HPI:  Mr. Ozzie Carney is a 52-year-old male with a history of prostate cancer diagnosed in September 2021. He underwent a TRUS prostate biopsy under the auspices of Dr. Reba Gates on September 15, 2021. His pathology was significant for Kelly 3+4=7 adenocarcinoma of the prostate in the right apex. Two out of two cores were positive. Kelly 3+3=6 adenocarcinoma of the prostate was found in the left apex. Prostate size was measured at 69 ml. His PSA in August 2021 was elevated at 9.58. Prostate cancer treatment options have been discussed with Mr. Ozzie Carney, specifically Robotic Assisted Laparoscopic Radical Prostatectomy with Bilateral Pelvic Lymph Node Dissection and External Beam Radiation Therapy. He presents today to address questions and concerns and finalize his management plan.      Past Medical History:   Diagnosis Date    Hypertension, essential     Morbid obesity ([address]     Psoriasis        Past Surgical History:   Procedure Laterality Date    KNEE ARTHROSCOPY      right    PROSTATE BIOPSY  2021    ULNAR TUNNEL RELEASE         Current Outpatient Medications on File Prior to Visit   Medication Sig Dispense Refill    amLODIPine (NORVASC) 10 MG tablet Take 1 tablet by mouth daily 90 tablet 3    tamsulosin (FLOMAX) 0.4 MG capsule Take 1 capsule by mouth daily 90 capsule 3     Current Facility-Administered Medications on File Prior to Visit   Medication Dose Route Frequency Provider Last Rate Last Admin    gentamicin (GARAMYCIN) injection 80 mg  80 mg IntraMUSCular Once Jamshid Smith MD           No Known Allergies    Family History   Problem Relation Age of Onset    Heart Disease Father     Colon Cancer Neg Hx     Prostate Cancer Neg Hx        Social History     Socioeconomic History    Marital status:      Spouse name: Not on file    Number of children: Not on file    Years of education: Not on file    Highest education level: Not on file   Occupational History    Not on file   Tobacco Use    Smoking status: Never Smoker    Smokeless tobacco: Never Used   Substance and Sexual Activity    Alcohol use: No    Drug use: No    Sexual activity: Not on file   Other Topics Concern    Not on file   Social History Narrative    Not on file     Social Determinants of Health     Financial Resource Strain:     Difficulty of Paying Living Expenses:    Food Insecurity:     Worried About Running Out of Food in the Last Year:     [address] in the Last Year:    Transportation Needs:     Lack of Transportation (Medical):  Lack of Transportation (Non-Medical):    Physical Activity:     Days of Exercise per Week:     Minutes of Exercise per Session:    Stress:     Feeling of Stress :    Social Connections:     Frequency of Communication with Friends and Family:     Frequency of Social Gatherings with Friends and Family:     Attends [employer] Services:     Active Member of Clubs or Organizations:     Attends Club or Organization Meetings:     Marital Status:    Intimate Partner Violence:     Fear of Current or Ex-Partner:     Emotionally Abused:     Physically Abused:     Sexually Abused:        Review of Systems  Constitutional: Negative for fatigue, fever and unexpected weight change. HENT: Negative for congestion and trouble swallowing. Eyes: Negative for pain and itching. Respiratory: Negative for cough and shortness of breath. Cardiovascular: Negative for chest pain and leg swelling. Gastrointestinal: Negative for abdominal pain, constipation, diarrhea and nausea. Endocrine: Negative for cold intolerance and heat intolerance. Genitourinary: See HPI.    Musculoskeletal:  Negative for back pain and joint swelling. Skin: Negative for rash. Neurological: Negative for dizziness, weakness, numbness and headaches. Psychiatric/Behavioral: The patient is not nervous/anxious. Exam    BP (!) 148/70   Ht 6' (1.829 m)   Wt 279 lb (126.6 kg)   BMI 37.84 kg/m²     Constitutional: Oriented to person, place, and time. Vital signs are normal. appears well-developed and well-nourished. Cooperative. No distress. HENT:    Head: Normocephalic and atraumatic. Eyes: EOM are normal. Pupils are equal, round, and reactive to light. Right eye exhibits no discharge. Left eye exhibits no discharge. No scleral icterus. Neck: Trachea normal. No JVD present. Cardiovascular: Normal rate and regular rhythm. S1/S2. Pulmonary/Chest: Effort normal. No respiratory distress. No wheezes, rhonchi, or rales. Abdominal: Soft. Exhibits mild distension. No generalized tenderness. There is no rebound, rigidity, or guarding. No CVA tenderness. Musculoskeletal: Trace pitting edema. No calf tenderness. Lymphadenopathy:   Right: No supraclavicular adenopathy present. Left: No supraclavicular adenopathy present. Neurological: Alert and oriented to person, place, and time. No cranial nerve deficit. Skin: Skin is warm and dry. Not diaphoretic. Psychiatric: Normal mood and affect. Behavior is normal.   Nursing note and vitals reviewed.     Labs    Results for POC orders placed in visit on 10/18/21   POCT Urinalysis No Micro (Auto)   Result Value Ref Range    Glucose, Ur Negative NEGATIVE mg/dl    Bilirubin Urine Negative     Ketones, Urine Negative NEGATIVE    Specific Gravity, Urine >= 1.030 1.002 - 1.030    Blood, UA POC Small (A) NEGATIVE    pH, Urine 5.50 5.0 - 9.0    Protein, Urine Negative NEGATIVE mg/dl    Urobilinogen, Urine 0.20 0.0 - 1.0 eu/dl    Nitrite, Urine Negative NEGATIVE    Leukocyte Clumps, Urine Negative NEGATIVE    Color, Urine Yellow [id number]    Character, Urine Clear [id number]       Lab Results Component Value Date    CREATININE 0.6 07/13/2021    BUN 16 07/13/2021     07/13/2021    K 4.3 07/13/2021     07/13/2021    CO2 26 07/13/2021       Lab Results   Component Value Date    PSA 9.58 (H) 08/18/2021    PSA 9.24 (H) 07/13/2021     Pathology: September 2021    FINAL DIAGNOSIS:   A, B, D, E.  Prostate, left base, left mid, right base, and right mid,   biopsies:             Benign prostatic tissue.             Negative for malignancy. C.  Prostate, left apex, needle core biopsies:             Adenocarcinoma of the prostate, involving 1 of 3 cores,   Kelly score 3+3 = 6 (grade group 1) measuring 2 mm / 5 mm (40%) of   core. F.  Prostate, right apex, needle core biopsy:             Adenocarcinoma of the prostate, involving 2 of 2 cores,   [place] score 3+4 = 7 (grade group 2) measuring 8 mm / 13 mm (62%) and   7 mm / 11 mm (64%) of each core. Assessment/Plan:    1. Prostate cancer- Status post TRUS prostate biopsy under the auspices of Dr. Gordon Butler on September 15, 2021. His pathology was significant for Kelly 3+4=7 adenocarcinoma of the prostate in the right apex. Two out of two cores were positive. Kelly 3+3=6 adenocarcinoma of the prostate was found in the left apex. Prostate size was measured at 69 ml. His PSA in August 2021 was elevated at 9.58. Prostate cancer treatment options have been discussed with Mr. Srini Hoffman, specifically Robotic Assisted Laparoscopic Radical Prostatectomy with Bilateral Pelvic Lymph Node Dissection and External Beam Radiation Therapy. Both treatment modalities were discussed at length, including technical aspects and possible risks associated with both options. He understands that urinary incontinence and erectile dysfunction are two common side effects of both therapies. Mr. Srini Hoffman was offered referral to Radiation Oncology to discuss External Beam Radiation Therapy, however he declined. He would like to proceed with the aforementioned surgery.  Surgical risks were discussed, including bleeding, infection, damage to tissue or adjacent organs, anesthesia risks, need for blood/blood products, development of post op blood clots and/or death. Patient understands and wishes to proceed. Will obtain medical clearance with Dr. Naveen Xiong. Pre-operative testing has been ordered. Will schedule pelvic floor therapy referral to Katina Rodriguez. All questions were answered in regards to pre-op, intra-op, and post-op concerns and expectations. He will be scheduled to undergo a Robotic Assisted Laparoscopic Radical Prostatectomy with Bilateral Pelvic Lymph Node Dissection in the near future. 2. HTN- On Norvasc. 3. BPH with LUTS- On Flomax daily. Symptomatology improved with Flomax therapy.

## 2021-10-20 ENCOUNTER — HOSPITAL ENCOUNTER (OUTPATIENT)
Dept: GENERAL RADIOLOGY | Age: 66
Discharge: HOME | End: 2021-10-20
Payer: MEDICARE

## 2021-10-20 ENCOUNTER — HOSPITAL ENCOUNTER (OUTPATIENT)
Age: 66
Discharge: HOME | End: 2021-10-20
Payer: MEDICARE

## 2021-10-20 ENCOUNTER — NURSE ONLY (OUTPATIENT)
Dept: LAB | Age: 66
End: 2021-10-20

## 2021-10-20 DIAGNOSIS — Z01.818 PRE-OP TESTING: ICD-10-CM

## 2021-10-20 DIAGNOSIS — C61 PROSTATE CANCER (HCC): ICD-10-CM

## 2021-10-20 LAB
ANION GAP SERPL CALCULATED.3IONS-SCNC: 12 MEQ/L (ref 8–16)
BASOPHILS # BLD: 1.1 %
BASOPHILS ABSOLUTE: 0.1 THOU/MM3 (ref 0–0.1)
BUN BLDV-MCNC: 14 MG/DL (ref 7–22)
CALCIUM SERPL-MCNC: 9.4 MG/DL (ref 8.5–10.5)
CHLORIDE BLD-SCNC: 104 MEQ/L (ref 98–111)
CO2: 22 MEQ/L (ref 23–33)
CREAT SERPL-MCNC: 0.7 MG/DL (ref 0.4–1.2)
EKG ATRIAL RATE: 74 BPM
EKG P AXIS: 69 DEGREES
EKG P-R INTERVAL: 170 MS
EKG Q-T INTERVAL: 372 MS
EKG QRS DURATION: 82 MS
EKG QTC CALCULATION (BAZETT): 412 MS
EKG R AXIS: 75 DEGREES
EKG T AXIS: 42 DEGREES
EKG VENTRICULAR RATE: 74 BPM
EOSINOPHIL # BLD: 3.4 %
EOSINOPHILS ABSOLUTE: 0.2 THOU/MM3 (ref 0–0.4)
ERYTHROCYTE [DISTWIDTH] IN BLOOD BY AUTOMATED COUNT: 13.7 % (ref 11.5–14.5)
ERYTHROCYTE [DISTWIDTH] IN BLOOD BY AUTOMATED COUNT: 43.8 FL (ref 35–45)
GFR SERPL CREATININE-BSD FRML MDRD: > 90 ML/MIN/1.73M2
GLUCOSE BLD-MCNC: 91 MG/DL (ref 70–108)
HCT VFR BLD CALC: 46.6 % (ref 42–52)
HEMOGLOBIN: 15.1 GM/DL (ref 14–18)
IMMATURE GRANS (ABS): 0.04 THOU/MM3 (ref 0–0.07)
IMMATURE GRANULOCYTES: 0.6 %
LYMPHOCYTES # BLD: 33.4 %
LYMPHOCYTES ABSOLUTE: 2.1 THOU/MM3 (ref 1–4.8)
MCH RBC QN AUTO: 28.7 PG (ref 26–33)
MCHC RBC AUTO-ENTMCNC: 32.4 GM/DL (ref 32.2–35.5)
MCV RBC AUTO: 88.4 FL (ref 80–94)
MONOCYTES # BLD: 9.8 %
MONOCYTES ABSOLUTE: 0.6 THOU/MM3 (ref 0.4–1.3)
NUCLEATED RED BLOOD CELLS: 0 /100 WBC
ORGANISM: ABNORMAL
PLATELET # BLD: 262 THOU/MM3 (ref 130–400)
PMV BLD AUTO: 10 FL (ref 9.4–12.4)
POTASSIUM SERPL-SCNC: 4.5 MEQ/L (ref 3.5–5.2)
RBC # BLD: 5.27 MILL/MM3 (ref 4.7–6.1)
SEG NEUTROPHILS: 51.7 %
SEGMENTED NEUTROPHILS ABSOLUTE COUNT: 3.3 THOU/MM3 (ref 1.8–7.7)
SODIUM BLD-SCNC: 138 MEQ/L (ref 135–145)
URINE CULTURE, ROUTINE: ABNORMAL
WBC # BLD: 6.4 THOU/MM3 (ref 4.8–10.8)

## 2021-10-20 PROCEDURE — 93010 ELECTROCARDIOGRAM REPORT: CPT | Performed by: NUCLEAR MEDICINE

## 2021-10-20 PROCEDURE — 93005 ELECTROCARDIOGRAM TRACING: CPT

## 2021-10-20 PROCEDURE — 71046 X-RAY EXAM CHEST 2 VIEWS: CPT

## 2021-10-26 ENCOUNTER — E-VISIT (OUTPATIENT)
Dept: FAMILY MEDICINE CLINIC | Age: 66
End: 2021-10-26
Payer: MEDICARE

## 2021-10-26 DIAGNOSIS — B35.6 TINEA CRURIS: Primary | ICD-10-CM

## 2021-10-26 PROCEDURE — 99421 OL DIG E/M SVC 5-10 MIN: CPT | Performed by: FAMILY MEDICINE

## 2021-10-26 RX ORDER — NYSTATIN 100000 [USP'U]/G
POWDER TOPICAL
Qty: 60 G | Refills: 1 | Status: SHIPPED | OUTPATIENT
Start: 2021-10-26 | End: 2021-12-14

## 2021-10-26 NOTE — PROGRESS NOTES
Will treat for tinea with nystatin pwd  He will f/u if not improving  5 min spent     Diagnosis Orders   1.  Tinea cruris  nystatin (MYCOSTATIN) 753390 UNIT/GM powder

## 2021-10-29 ENCOUNTER — E-VISIT (OUTPATIENT)
Dept: OTHER | Facility: CLINIC | Age: 66
End: 2021-10-29
Payer: MEDICARE

## 2021-10-29 DIAGNOSIS — L30.9 DERMATITIS: Primary | ICD-10-CM

## 2021-10-29 PROCEDURE — 99421 OL DIG E/M SVC 5-10 MIN: CPT | Performed by: FAMILY MEDICINE

## 2021-10-29 RX ORDER — PREDNISONE 20 MG/1
40 TABLET ORAL DAILY
Qty: 10 TABLET | Refills: 0 | Status: SHIPPED | OUTPATIENT
Start: 2021-10-29 | End: 2021-11-03

## 2021-10-29 RX ORDER — HYDROXYZINE HYDROCHLORIDE 25 MG/1
25 TABLET, FILM COATED ORAL EVERY 8 HOURS PRN
Qty: 30 TABLET | Refills: 0 | Status: SHIPPED | OUTPATIENT
Start: 2021-10-29 | End: 2021-11-05 | Stop reason: ALTCHOICE

## 2021-10-29 NOTE — PROGRESS NOTES
Different rash on hand  ? Poison ivy  Will treat with oral steroids and prn atarax for itch  He will f/u if no better     Diagnosis Orders   1.  Dermatitis         5 min spent

## 2021-11-02 ENCOUNTER — HOSPITAL ENCOUNTER (OUTPATIENT)
Dept: PHYSICAL THERAPY | Age: 66
Setting detail: THERAPIES SERIES
Discharge: HOME | End: 2021-11-02
Payer: MEDICARE

## 2021-11-02 PROCEDURE — 97112 NEUROMUSCULAR REEDUCATION: CPT | Performed by: PHYSICAL THERAPIST

## 2021-11-02 PROCEDURE — 97161 PT EVAL LOW COMPLEX 20 MIN: CPT | Performed by: PHYSICAL THERAPIST

## 2021-11-02 PROCEDURE — 97530 THERAPEUTIC ACTIVITIES: CPT | Performed by: PHYSICAL THERAPIST

## 2021-11-02 NOTE — PROGRESS NOTES
** PLEASE SIGN, DATE AND TIME CERTIFICATION BELOW AND RETURN TO [facility] OUTPATIENT REHABILITATION (FAX #: [phone]). ATTEST/CO-SIGN IF ACCESSING VIA [employer]. THANK YOU.**    I certify that I have examined the patient below and determined that Physical Medicine and Rehabilitation service is necessary and that I approve the established plan of care for up to 90 days or as specifically noted. Attestation, signature or co-signature of physician indicates approval of certification requirements.    ________________________ ____________ __________  Physician Signature   Date   Time  [address] [facility]  PHYSICAL THERAPY  OUTPATIENT REHABILITATION - SPECIALIZED THERAPY SERVICES  [x] PELVIC HEALTH EVALUATION  [] DAILY NOTE  [] PROGRESS NOTE [] DISCHARGE NOTE    Date: 2021  Patient Name:  Tami Redmond  : 1955  MRN: [id number]  CSN: [id number]    Referring Practitioner Lauro Moncada MD   Diagnosis Malignant neoplasm of prostate [C61]    Treatment Diagnosis M62.58 - Muscle Wasting and Atrophy, nec, other site  M62.89 - Other Specified Disorders of Muscle and R35.1 - Nocturia  R39.14 - Feeling of Incomplete Bladder Emptying   Date of Evaluation 21    Additional Pertinent History HTN, obesity      Functional Outcome Measure Used IIQ and RON   Functional Outcome Score 2 and 3 (21)       Insurance: Primary: Payor: Lorenzo [place] /  /  / ,   Secondary: Neetu Ugarte   Authorization Information: No pre-cert req'd   Visit # 1, 1/10 for progress note   Visits Allowed: Unlimited based on med necessity   Recertification Date: 4486   Physician Follow-Up:    Physician Orders: eval and treat   History of Present Illness: RALP 2021     SUBJECTIVE: Pt is 75 yo male to have RALP 2021. Pt noticed onset of weak urine stream, incomplete emptying, and urinary frequency over the last year or so. Pt then had PSA testing f/b prostate biopsy which revealed the CA.   Pt was also told his prostate size is 2.5 times enlarged. Pt was started on Flomax which has been helpful with his urinary symptoms. Social/Functional History and Current Status:  Medications and Allergies have been reviewed and are listed on Medical History Questionnaire. Monse Arambula lives with significant other in a single story home with a level surface to enter. Task Previous Current   ADLs  Independent Independent   IADL's Independent anticipate post op urinary incont and lack of bladder control   Ambulation Independent anticipate post op urinary incont and lack of bladder control   Transfers Independent anticipate post op urinary incont and lack of bladder control   Recreation Independent likes to be active with house work and yard work   Community Integration Independent Independent   Driving Active  Active    Work [employer]. 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 PAIN -- pt denies consistent pain       BLADDER ASSESSMENT [] Deferred secondary to:   Daily Fluid Ingestion: 4-6c water, 3 bottles Pepsi, occas milk   Urination Frequency Times/Day: every few hours (every 30-60 min before starting Flomax with some incont if tried wait/delay)  Times/Night: 2   Volume Medium   Urge Sensation Normal    SYMPTOM QUESTIONNAIRE   Loses Urine Upon: none   Incontinence Volume: none   Frequency of Leakage: none   Wets the Bed: no   Burning/Pain with Urination: no   Difficulty Starting a Stream of Urine: no   Incomplete Emptying empties well at night; feels 50% empty daytime   Strain to Empty Bladder: no   Falling Out Feeling: n/a   Urinate more than 7 times/day: no   Use a form of Leakage Protection: no   Restrict Fluid Intake: no   Stream Strength Average       BOWEL ASSESSMENT [] Deferred secondary to:   Frequency:  Three times per week   Most Common Stool Consistency: soft   SYMPTOM QUESTIONNAIRE   Strain to have a BM: no   Include fiber in your diet: yes: fruits   Take laxatives/enemas regularly: no   Pain with BM: no   Strong urge to have BM: no   Leak/Stain Feces: no   Diarrhea often: no         SEXUAL ASSESSMENT [] Deferred secondary to:   Sexually Active Has had ED for about 1 year     VITALS [] Deferred secondary to:   Height 6'0\"   Weight 270#       GENERAL ASSESSMENT   [] Deferred secondary to:   Palpation    Observation Pt has a rash on his lower abdomen x2wks; he does have a cream from PCP which has not been helpful so far   Posture Forward Head Posture and abdominal obesity   Range of Motion Lumbar and B LE AROM WFL and without c/o   Strength B LE strength WNL and without c/o; abdominal strength 2-/5   Gait WFL   Sensation WFL   Edema mild pitting edema B LE's   Balance/Fall History Denies balance or fall problems   Special Tests Neg SLR, ave hams flexi; Neg Homans           OBSERVATION  [] Deferred secondary to:   Patient Safety Patient offered a chaperone and declined.    Skin Condition intact   Urogenital Triangle No tenderness or tightness reported       PELVIC FLOOR EXTERNAL EXAM [] Deferred secondary to:   Exam perineal body   Sensation Intact   Muscle Localization Fair - due to weakness and glut/abdominal mm assist   Palpation/Tone Normal   Pelvic Floor Strength PERF:Power: 1,Endurance: 3,Reps: 5,Flicks: 10   Relax after Contraction Normal       TREATMENT   Precautions:     X in shaded column indicates activity completed today   Modalities Parameters/  Location  Notes                     Manual Therapy Time/Technique  Notes                     Exercise/Intervention    Notes   Basic pelvic anatomy, nature of condition, PT POC 10 min  x    Instruction on precise PFM localization 15 min  x    Post op restrictions, precautions, activity levels 10 min  x    kegel-quick 1 sec 10 x Do one kegel option every 2 hrs in sitting or lying   kegel-hold 3 sec 10 x                                                Specific Interventions Next Treatment: reassess pt's condition post op and progress program as approp to symptoms    Activity/Treatment Tolerance:  [x]  Patient tolerated treatment well  []  Patient limited by fatigue  []  Patient limited by pain   []  Patient limited by medical complications  []  Other:     Patient Education:   [x]  HEP/Education Completed: PT plan of care, Pelvic Floor Musculature anatomy with instruction on precise localization. Patient to start doing 10 reps of kegels every 2 hours in sitting or lying alternating quicks with holds. Handout provided. and Instructed on post-op precautions and contraindications, expectations, appropriate post op activity levels, post op fluid intake, signs and symptoms of DVT. Handouts provided. []  No new Education completed  []  Reviewed Prior HEP      [x]  Patient verbalized and/or demonstrated understanding of education provided. []  Patient unable to verbalize and/or demonstrate understanding of education provided. Will continue education. [x]  Barriers to learning: none    Assessment: Patient is in need of skilled PT to be instructed on precise Pelvic Floor Musculature (PFM) localization and to initiate appropriate home routine to prepare for upcoming surgical intervention. Patient is also in need of instructions on post op precautions, contraindications, expectations, activity levels, and general plan of care. Patient will require PT post op to address urinary incontinence, rehabilitation of the PFM and abdominal muscles, scar management, and bladder retraining.   Body Structures/Functions/Activity Limitations: Upcoming RALP which will result with urinary incontinence and lack of bladder control, PFM weakness with decreased localization and endurance, Decreased awareness of functional factors that increase pelvic organ strain, Decreased awareness of normal bladder habits, control, and diet effects on functioning, Abdominal and core weakness with abdominal obesity, Impaired endurance, Impaired motor control and Impaired strength  Prognosis: Good    GOALS:  Patient Goal: prepare for upcoming surgical intervention    Goal:  1 visit. Pt will be independent with basic HEP for PFM strengthening and localization of PFM and to fully understand post-op precautions and POC. MET    PLAN:  Treatment Recommendations: Strengthening, Functional Mobility Training, Transfer Training, Endurance Training, Neuromuscular Re-education, Manual Therapy - Soft Tissue Mobilization, Pain Management, Home Exercise Program, Patient Education and Self-Care Education and Training    [x]  Plan of care initiated. Plan to see patient 1 time pre op f/b 1 time every 2 weeks post op for 12 weeks post op to address the treatment planned outlined above.   []  Continue with current plan of care  []  Modify plan of care as follows:    []  Hold pending physician visit  []  Discharge    Time In 6:55   Time Out 7:55   Timed Code Minutes: 45 min   Total Treatment Time: 60 min       Electronically Signed by: ТАТЬЯНА SPRINGER

## 2021-11-03 ENCOUNTER — PREP FOR PROCEDURE (OUTPATIENT)
Dept: UROLOGY | Age: 66
End: 2021-11-03

## 2021-11-03 RX ORDER — SODIUM CHLORIDE 9 MG/ML
INJECTION, SOLUTION INTRAVENOUS CONTINUOUS
Status: CANCELLED | OUTPATIENT
Start: 2021-12-02

## 2021-11-05 ENCOUNTER — HOSPITAL ENCOUNTER (EMERGENCY)
Age: 66
Discharge: HOME | End: 2021-11-05
Attending: EMERGENCY MEDICINE
Payer: MEDICARE

## 2021-11-05 VITALS
OXYGEN SATURATION: 95 % | DIASTOLIC BLOOD PRESSURE: 89 MMHG | HEART RATE: 90 BPM | TEMPERATURE: 97.5 F | RESPIRATION RATE: 16 BRPM | BODY MASS INDEX: 36.62 KG/M2 | WEIGHT: 270 LBS | SYSTOLIC BLOOD PRESSURE: 179 MMHG

## 2021-11-05 DIAGNOSIS — R03.0 ELEVATED BLOOD PRESSURE READING: ICD-10-CM

## 2021-11-05 DIAGNOSIS — L20.9 ATOPIC DERMATITIS, UNSPECIFIED TYPE: Primary | ICD-10-CM

## 2021-11-05 PROCEDURE — 99213 OFFICE O/P EST LOW 20 MIN: CPT

## 2021-11-05 PROCEDURE — 99213 OFFICE O/P EST LOW 20 MIN: CPT | Performed by: EMERGENCY MEDICINE

## 2021-11-05 RX ORDER — PREDNISONE 20 MG/1
20 TABLET ORAL DAILY
Qty: 14 TABLET | Refills: 0 | Status: SHIPPED | OUTPATIENT
Start: 2021-11-05 | End: 2021-11-09

## 2021-11-05 RX ORDER — HYDROXYZINE 50 MG/1
50 TABLET, FILM COATED ORAL 4 TIMES DAILY PRN
Qty: 20 TABLET | Refills: 0 | Status: SHIPPED | OUTPATIENT
Start: 2021-11-05 | End: 2021-12-14

## 2021-11-05 ASSESSMENT — ENCOUNTER SYMPTOMS
SORE THROAT: 0
BACK PAIN: 0
STRIDOR: 0
WHEEZING: 0
EYE DISCHARGE: 0
COUGH: 0
EYE REDNESS: 0
VOMITING: 0
NAUSEA: 0
ABDOMINAL PAIN: 0
DIARRHEA: 0
TROUBLE SWALLOWING: 0
SINUS PRESSURE: 0
EYE PAIN: 0
SHORTNESS OF BREATH: 0
VOICE CHANGE: 0

## 2021-11-05 ASSESSMENT — PAIN DESCRIPTION - DESCRIPTORS: DESCRIPTORS: DISCOMFORT

## 2021-11-05 ASSESSMENT — PAIN DESCRIPTION - PAIN TYPE: TYPE: ACUTE PAIN

## 2021-11-05 ASSESSMENT — PAIN SCALES - GENERAL: PAINLEVEL_OUTOF10: 7

## 2021-11-05 ASSESSMENT — PAIN DESCRIPTION - FREQUENCY: FREQUENCY: CONTINUOUS

## 2021-11-05 ASSESSMENT — PAIN DESCRIPTION - ORIENTATION: ORIENTATION: RIGHT;LEFT

## 2021-11-05 ASSESSMENT — PAIN DESCRIPTION - LOCATION: LOCATION: GROIN

## 2021-11-05 ASSESSMENT — PAIN DESCRIPTION - ONSET: ONSET: PROGRESSIVE

## 2021-11-05 ASSESSMENT — PAIN DESCRIPTION - PROGRESSION: CLINICAL_PROGRESSION: GRADUALLY WORSENING

## 2021-11-05 ASSESSMENT — PAIN - FUNCTIONAL ASSESSMENT: PAIN_FUNCTIONAL_ASSESSMENT: ACTIVITIES ARE NOT PREVENTED

## 2021-11-05 NOTE — ED NOTES
Discharge instructions reviewed with patient. Patient informed to go to ER for worsening symptoms and to follow up with PCP. Patient ambulatory out in stable condition.       Nilsa Alvarado RN  11/05/21 4809

## 2021-11-05 NOTE — ED PROVIDER NOTES
[address]       Chief Complaint   Patient presents with    Rash     groin area and bilateral arms       Nurses Notes reviewed and I agree except as noted in the HPI. HISTORY OF PRESENT ILLNESS   Clara Hernandez is a 74 y.o. male who presents with 2-week history of increasing severe rash on the thighs, lower abdominal wall. Rash is itchy and also a burning discomfort. No blisters, fever, vomiting, painful glands or swollen glands, purulent discharge. Patient had this rash in 2020 and was diagnosed as a contact dermatitis to a cosmetic agent. No new soaps, cosmetics, medications. No insect sting or bite. No environmental exposures. History of hypertension. No history of diabetes or asthma. Non-smoker. REVIEW OF SYSTEMS     Review of Systems   Constitutional: Negative for appetite change, chills, fatigue, fever and unexpected weight change. HENT: Negative for congestion, ear discharge, ear pain, sinus pressure, sneezing, sore throat, trouble swallowing and voice change. Eyes: Negative for pain, discharge and redness. Respiratory: Negative for cough, shortness of breath, wheezing and stridor. No shortness of breath, stridor, wheezing   Cardiovascular: Negative for chest pain and leg swelling. No chest pain, dizziness, syncope   Gastrointestinal: Negative for abdominal pain, diarrhea, nausea and vomiting. No abdominal pain or vomiting   Genitourinary: Negative for dysuria, frequency, hematuria and urgency. Musculoskeletal: Negative for arthralgias, back pain, myalgias and neck pain. Skin: Negative for rash. Itchy rash proximal thighs, lower abdominal wall   Neurological: Negative for dizziness, syncope, weakness and headaches. No headache or dizziness   Hematological: Negative for adenopathy.    Psychiatric/Behavioral: Negative for behavioral problems, confusion, sleep disturbance and suicidal ideas. The patient is not nervous/anxious. All other systems reviewed and are negative. red and bold elements reviewed    PAST MEDICAL HISTORY         Diagnosis Date    Cancer [facility])     prostate    Hypertension, essential     Morbid obesity ([facility] [address]     Psoriasis        SURGICAL HISTORY     Patient  has a past surgical history that includes Knee arthroscopy; Ulnar tunnel release; and Prostate biopsy (2021). CURRENT MEDICATIONS       Discharge Medication List as of 11/5/2021  5:42 PM      CONTINUE these medications which have NOT CHANGED    Details   nystatin (MYCOSTATIN) 850302 UNIT/GM powder Apply 3 times daily. , Disp-60 g, R-1, Normal      amLODIPine (NORVASC) 10 MG tablet Take 1 tablet by mouth daily, Disp-90 tablet, R-3Normal      tamsulosin (FLOMAX) 0.4 MG capsule Take 1 capsule by mouth daily, Disp-90 capsule, R-3Normal             ALLERGIES     Patient is has No Known Allergies. FAMILY HISTORY     Patient'sfamily history includes Heart Disease in his father. SOCIAL HISTORY     Patient  reports that he has never smoked. He has never used smokeless tobacco. He reports that he does not drink alcohol and does not use drugs. PHYSICAL EXAM     ED TRIAGE VITALS  BP: (!) 179/89, Temp: 97.5 °F (36.4 °C), Pulse: 90, Resp: 16, SpO2: 95 %  Physical Exam  Vitals and nursing note reviewed. Constitutional:       General: He is not in acute distress. Appearance: He is well-developed. He is not ill-appearing. Comments: Moist membranes, normal airway   HENT:      Head: Normocephalic and atraumatic. Right Ear: Tympanic membrane and external ear normal.      Left Ear: Tympanic membrane and external ear normal.      Nose: Nose normal.      Mouth/Throat:      Pharynx: No oropharyngeal exudate. Comments: Oropharynx normal  Eyes:      General: No scleral icterus. Right eye: No discharge. Left eye: No discharge. Extraocular Movements:      Right eye: Normal extraocular motion. Left eye: Normal extraocular motion. Conjunctiva/sclera: Conjunctivae normal.      Pupils: Pupils are equal, round, and reactive to light. Comments: Conjunctiva clear   Neck:      Thyroid: No thyromegaly. Vascular: No JVD. Comments: No meningismus  Cardiovascular:      Rate and Rhythm: Normal rate and regular rhythm. Pulses: Normal pulses. Heart sounds: Normal heart sounds, S1 normal and S2 normal. No murmur heard. No friction rub. No gallop. Comments: No murmur  Pulmonary:      Effort: Pulmonary effort is normal. No tachypnea or respiratory distress. Breath sounds: Normal breath sounds. No stridor. No decreased breath sounds, wheezing, rhonchi or rales. Comments: No cough, lungs clear throughout  Chest:      Chest wall: No tenderness. Abdominal:      General: Bowel sounds are normal. There is no distension. Palpations: Abdomen is soft. There is no mass. Tenderness: There is no abdominal tenderness. There is no guarding or rebound. Comments: Soft nontender   Musculoskeletal:         General: No tenderness. Normal range of motion. Cervical back: Normal range of motion. Comments: Joints normal   Lymphadenopathy:      Cervical: No cervical adenopathy. Right cervical: No superficial or deep cervical adenopathy. Left cervical: No superficial or deep cervical adenopathy. Skin:     General: Skin is warm and dry. Findings: No erythema or rash. Comments: Symmetrical atopic dermatitis thighs and lower abdominal wall. No evidence of cellulitis or abscess. No vesicular lesions   Neurological:      Mental Status: He is alert and oriented to person, place, and time. Cranial Nerves: No cranial nerve deficit. Motor: No abnormal muscle tone. Coordination: Coordination normal.      Deep Tendon Reflexes: Reflexes are normal and symmetric.  Reflexes normal.      Comments: Appropriate, no focal findings, gait normal Psychiatric:         Behavior: Behavior normal.         Thought Content: Thought content normal.         Judgment: Judgment normal.         DIAGNOSTIC RESULTS   Labs: No results found for this visit on 11/05/21. IMAGING:  No orders to display     URGENT CARE COURSE:     Vitals:    11/05/21 1659   BP: (!) 179/89   Pulse: 90   Resp: 16   Temp: 97.5 °F (36.4 °C)   TempSrc: Temporal   SpO2: 95%   Weight: 270 lb (122.5 kg)       Medications - No data to display  PROCEDURES:  None  FINALIMPRESSION      1. Atopic dermatitis, unspecified type    2. Elevated blood pressure reading        DISPOSITION/PLAN   DISPOSITION Decision To Discharge 11/05/2021 05:37:22 PM  Nontoxic, well-hydrated, normal airway. No airway abscess or epiglottitis, sepsis, CNS infection, pneumonia, hypoxia, bronchospasm. No infectious process. No anaphylactoid or anaphylactic reaction. No angioedema, urticaria, SJS, TEN. Patient has allergic contact dermatitis/atopic dermatitis etiology unclear. Will treat with prednisone, Atarax, Zyrtec, increased oral clear liquids, rest.  Patient to recheck with PCP in 4 days, even if improved, and understands to go to ED if worse.   PATIENT REFERRED TO:  Carmelita Curtis  [phone]    Schedule an appointment as soon as possible for a visit in 4 days  Recheck in office, go to emergency if worse    DISCHARGE MEDICATIONS:  Discharge Medication List as of 11/5/2021  5:42 PM      START taking these medications    Details   predniSONE (DELTASONE) 20 MG tablet Take 1 tablet by mouth daily for 12 days 2 p.o. daily for 4 days, 1 p.o. daily for 4 days, one half p.o. daily for 4 days, Disp-14 tablet, R-0Print      hydrOXYzine (ATARAX) 50 MG tablet Take 1 tablet by mouth 4 times daily as needed for Itching (Rash and swelling) Caution not at work will cause drowsiness, Disp-20 tablet, R-0Print           Discharge Medication List as of 11/5/2021  5:42 PM          Andi PUGH MD Blake Houston MD  11/05/21 0807

## 2021-11-05 NOTE — ED TRIAGE NOTES
Pt to room 8 with c/o a rash o his groin area and bilateral hands that started 2 weeks ago. Dr Alberto Thomas called in Nystatin powder and Atarax for him, he has been using it and states it is not helping.

## 2021-11-09 ENCOUNTER — OFFICE VISIT (OUTPATIENT)
Dept: FAMILY MEDICINE CLINIC | Age: 66
End: 2021-11-09
Payer: MEDICARE

## 2021-11-09 VITALS
HEART RATE: 88 BPM | DIASTOLIC BLOOD PRESSURE: 78 MMHG | SYSTOLIC BLOOD PRESSURE: 142 MMHG | BODY MASS INDEX: 38.79 KG/M2 | WEIGHT: 286.4 LBS | HEIGHT: 72 IN | RESPIRATION RATE: 14 BRPM | TEMPERATURE: 97.8 F

## 2021-11-09 DIAGNOSIS — B35.6 TINEA CRURIS: Primary | ICD-10-CM

## 2021-11-09 DIAGNOSIS — I10 HYPERTENSION, ESSENTIAL: Chronic | ICD-10-CM

## 2021-11-09 DIAGNOSIS — E66.9 OBESITY (BMI 30-39.9): Chronic | ICD-10-CM

## 2021-11-09 DIAGNOSIS — Z23 NEEDS FLU SHOT: ICD-10-CM

## 2021-11-09 PROCEDURE — G0008 ADMIN INFLUENZA VIRUS VAC: HCPCS | Performed by: FAMILY MEDICINE

## 2021-11-09 PROCEDURE — G8417 CALC BMI ABV UP PARAM F/U: HCPCS | Performed by: FAMILY MEDICINE

## 2021-11-09 PROCEDURE — 99214 OFFICE O/P EST MOD 30 MIN: CPT | Performed by: FAMILY MEDICINE

## 2021-11-09 PROCEDURE — G8484 FLU IMMUNIZE NO ADMIN: HCPCS | Performed by: FAMILY MEDICINE

## 2021-11-09 PROCEDURE — 4040F PNEUMOC VAC/ADMIN/RCVD: CPT | Performed by: FAMILY MEDICINE

## 2021-11-09 PROCEDURE — 90694 VACC AIIV4 NO PRSRV 0.5ML IM: CPT | Performed by: FAMILY MEDICINE

## 2021-11-09 PROCEDURE — 1036F TOBACCO NON-USER: CPT | Performed by: FAMILY MEDICINE

## 2021-11-09 PROCEDURE — 3017F COLORECTAL CA SCREEN DOC REV: CPT | Performed by: FAMILY MEDICINE

## 2021-11-09 PROCEDURE — 1123F ACP DISCUSS/DSCN MKR DOCD: CPT | Performed by: FAMILY MEDICINE

## 2021-11-09 PROCEDURE — G8427 DOCREV CUR MEDS BY ELIG CLIN: HCPCS | Performed by: FAMILY MEDICINE

## 2021-11-09 RX ORDER — FLUCONAZOLE 100 MG/1
200 TABLET ORAL DAILY
Qty: 20 TABLET | Refills: 0 | Status: SHIPPED | OUTPATIENT
Start: 2021-11-09 | End: 2021-11-19

## 2021-11-09 RX ORDER — LISINOPRIL 30 MG/1
30 TABLET ORAL DAILY
Qty: 90 TABLET | Refills: 3 | Status: SHIPPED | OUTPATIENT
Start: 2021-11-09

## 2021-11-09 ASSESSMENT — PATIENT HEALTH QUESTIONNAIRE - PHQ9
SUM OF ALL RESPONSES TO PHQ QUESTIONS 1-9: 0
2. FEELING DOWN, DEPRESSED OR HOPELESS: 0
SUM OF ALL RESPONSES TO PHQ QUESTIONS 1-9: 0
SUM OF ALL RESPONSES TO PHQ9 QUESTIONS 1 & 2: 0
SUM OF ALL RESPONSES TO PHQ QUESTIONS 1-9: 0
1. LITTLE INTEREST OR PLEASURE IN DOING THINGS: 0

## 2021-11-09 NOTE — Clinical Note
[address] [place] [address]. [facility] [address]  Phone: [phone]  [facility]ax: [phone]        November 9, 2021     Patient: Jaye Romano   YOB: 1955   Date of Visit: 11/9/2021       To Whom It May Concern: It is my medical opinion that Cyrus Benton {Work release (duty restriction):[id number]}. If you have any questions or concerns, please don't hesitate to call.     Sincerely,        Kiel Bar, DO

## 2021-11-09 NOTE — PROGRESS NOTES
Chief Complaint   Patient presents with    Rash     x3 weeks. groin area. very itchy    Hypertension    Obesity       History obtained from the patient. SUBJECTIVE:  Hany Martin is a 74 y.o. male that presents today for     -Rash:  In groin area  Been there 3 wks  In lower abdomen and groin  Tried nystatin pwd, did not help. Red, itchy and scaly    Inciting events or exposures prior to rash starting? No  Pruritic? Yes  Erythematous? Yes  Weeping or drainage? No  History of Urticaria? No  Fever? No  Painful? No  New Detergent?   No      -HTN:    HPI:  New dx  On norvasc 10mg  lisinpril added last year  However, ran out of lisinpril and BP's are higher again  When on both <140/90  No CP/sOB    Taking meds as prescribed ?: yes  Tolerating well ?: yes  Side Effects ?: denies  BP at home ?: as above  Working on TLCS ?: yes  Chest Pain/SOB/Palpitations? denies    BP Readings from Last 3 Encounters:   11/09/21 (!) 142/78   11/05/21 (!) 179/89   10/18/21 (!) 148/70       -Obesity:  Working on wt loss  BMP down some      Age/Gender Health Maintenance    Lipid -   Lab Results   Component Value Date    CHOL 161 07/13/2021    CHOL 156 01/22/2014     Lab Results   Component Value Date    TRIG 101 07/13/2021    TRIG 141 01/22/2014     Lab Results   Component Value Date    HDL 42 07/13/2021    HDL 35 01/22/2014     Lab Results   Component Value Date    LDLCALC 99 07/13/2021    LDLCALC 93 01/22/2014     No results found for: LABVLDL, VLDL  No results found for: CHOLHDLRATIO      DM Screen -   Lab Results   Component Value Date    GLUCOSE 91 10/20/2021       Colon Cancer Screening - + polyp SEPT 2020, repeat 5 years per GI, [address] (Age 54 to [de-identified] with 30 pack year hx, current smoker or quit within past 15 years) - never smoker    Tetanus - to get at pharmacy per medicare rules  Influenza Vaccine - UTD FALL 2020  Pneumonia Vaccine - UTD SEPT 2020  Zoster - to get at pharmacy per medicare rules     PSA testing discussion - follows with urology  Lab Results   Component Value Date    PSA 9.58 (H) 08/18/2021    PSA 9.24 (H) 07/13/2021     AAA Screening - never smoker      Current Outpatient Medications   Medication Sig Dispense Refill    fluconazole (DIFLUCAN) 100 MG tablet Take 2 tablets by mouth daily for 10 days 20 tablet 0    lisinopril (PRINIVIL;ZESTRIL) 30 MG tablet Take 1 tablet by mouth daily 90 tablet 3    hydrOXYzine (ATARAX) 50 MG tablet Take 1 tablet by mouth 4 times daily as needed for Itching (Rash and swelling) Caution not at work will cause drowsiness 20 tablet 0    nystatin (MYCOSTATIN) 300746 UNIT/GM powder Apply 3 times daily. 60 g 1    amLODIPine (NORVASC) 10 MG tablet Take 1 tablet by mouth daily 90 tablet 3    tamsulosin (FLOMAX) 0.4 MG capsule Take 1 capsule by mouth daily 90 capsule 3     No current facility-administered medications for this visit. Orders Placed This Encounter   Medications    fluconazole (DIFLUCAN) 100 MG tablet     Sig: Take 2 tablets by mouth daily for 10 days     Dispense:  20 tablet     Refill:  0    lisinopril (PRINIVIL;ZESTRIL) 30 MG tablet     Sig: Take 1 tablet by mouth daily     Dispense:  90 tablet     Refill:  3         All medications reviewed and reconciled, including OTC and herbal medications. Updated list given to patient. Patient Active Problem List    Diagnosis Date Noted    Prostate cancer ([facility] [address] 10/18/2021    Elevated PSA, less than 10 ng/ml 07/13/2021    Hypertension, essential     Obesity (BMI 30-39. 9)     Psoriasis        Past Medical History:   Diagnosis Date    Cancer [facility])     prostate    Hypertension, essential     Obesity (BMI 30-39. 9)     Psoriasis          Past Surgical History:   Procedure Laterality Date    KNEE ARTHROSCOPY      right    PROSTATE BIOPSY  2021    ULNAR TUNNEL RELEASE           No Known Allergies      Social History     Tobacco Use    Smoking status: Never Smoker    Smokeless tobacco: Never Used   Substance Use Topics    Alcohol use: No         Family History   Problem Relation Age of Onset    Heart Disease Father     Colon Cancer Neg Hx     Prostate Cancer Neg Hx          I have reviewed the patient's past medical history, past surgical history, allergies, medications, social and family history and I have made updates where appropriate. Review of Systems  Positive responses are highlighted in bold    Constitutional:  Fever, Chills, Night Sweats, Fatigue, Unexpected changes in weight  HENT:  Ear pain, Tinnitus, Nosebleeds, Trouble swallowing, Hearing loss, Sore throat  Cardiovascular:  Chest Pain, Palpitations, Orthopnea, Paroxysmal Nocturnal Dyspnea  Respiratory:  Cough, Wheezing, Shortness of breath, Chest tightness, Apnea  Gastrointestinal:  Nausea, Vomiting, Diarrhea, Constipation, Heartburn, Blood in stool  Genitourinary:  Difficulty or painful urination, Flank pain, Change in frequency, Urgency  Skin:  Color change, Rash, Itching, Wound  Musculoskeletal:  Joint pain, Back pain, Gait problems, Joint swelling, Myalgias  Neurological:  Dizziness, Headaches, Presyncope, Numbness, Seizures, Tremors  Endocrine:  Heat Intolerance, Cold Intolerance, Polydipsia, Polyphagia, Polyuria      PHYSICAL EXAM:  Vitals:    11/09/21 0735   BP: (!) 142/78   Pulse: 88   Resp: 14   Temp: 97.8 °F (36.6 °C)   TempSrc: Oral   Weight: 286 lb 6.4 oz (129.9 kg)   Height: 6' (1.829 m)     Body mass index is 38.84 kg/m².   Pain Score:   0 - No pain    VS Reviewed  General Appearance: A&O x 3, No acute distress,well developed and well- nourished  Eyes: pupils equal, round, and reactive to light, extraocular eye movements intact, conjunctivae and eye lids without erythema  ENT: external ear and ear canal clear bilaterally, TMs intact and regular, nose without deformity, nasal mucosa and turbinates normal without polyps, oropharynx normal, dentition is normal for age  Neck: supple and non-tender without mass, no thyromegaly or thyroid nodules, no cervical lymphadenopathy  Pulmonary/Chest: clear to auscultation bilaterally- no wheezes, rales or rhonchi, normal air movement, no respiratory distress or retractions  Cardiovascular: S1 and S2 auscultated w/ RRR. No murmurs, rubs, clicks, or gallops, distal pulses intact. Abdomen: soft, non-tender, non-distended, bowel sounds physiologic,  no rebound or guarding, no masses or hernias noted. Liver and spleen without enlargement. Extremities: no cyanosis, clubbing or edema of the lower extremities. Skin: warm and dry, no rash or erythema. Erythematous rash from lower abdomen to upper thighs bilat with scale      ASSESSMENT & PLAN  1. Tinea cruris    Failed topical antifungal and steroids  Less likely dermatitis  Could be erythrasma, but will treat as fungal 1st  D/t extensive area, will start diflucan 200mg daily x 10 days  Has f/u next wk,keep that  If no better, consider derm con    - fluconazole (DIFLUCAN) 100 MG tablet; Take 2 tablets by mouth daily for 10 days  Dispense: 20 tablet; Refill: 0    2. Hypertension, essential    Above goal  con't norvasc  Resume lisinopril 30mg  F/u next wk  Labs UTD    - lisinopril (PRINIVIL;ZESTRIL) 30 MG tablet; Take 1 tablet by mouth daily  Dispense: 90 tablet; Refill: 3    3. Obesity (BMI 30-39.9)    con't wt loss strategies    4. Needs flu shot    - INFLUENZA, QUADV, ADJUVANTED, 65 YRS =, IM, PF, PREFILL SYR, 0.5ML (FLUAD)      DISPOSITION    Return in 8 days (on 11/17/2021) for preop and f/u rash and BP's, sooner as needed. Varun Auguste released without restrictions. PATIENT COUNSELING    Varun Mayadam received counseling on the following healthy behaviors: nutrition, exercise and medication adherence    Barriers to learning and self management: none    Discussed use, benefit, and side effects of prescribed medications. Barriers to medication compliance addressed. All patient questions answered. Pt voiced understanding.      Future

## 2021-11-09 NOTE — PROGRESS NOTES
Vaccine Information Sheet, \"Influenza - Inactivated\"  given to Kelly Raymundo, or parent/legal guardian of  Kelly Raymundo and verbalized understanding. Patient responses:    Have you ever had a reaction to a flu vaccine? No  Do you have an allergy to eggs, neomycin or polymixin? No  Do you have an allergy to Thimerosal, contact lens solution, or Merthiolate? No  Have you ever had [place] Syndrome? No  Do you have any current illness? No  Do you have a temperature above 100 degrees? No  Are you pregnant? No  If pregnant, permission obtained from physician? No  Do you have an active neurological disorder? No      Flu vaccine given per order. Please see immunization tab.   Immunization History   Administered Date(s) Administered    COVID-19, [facility] Class, Primary or Immunocompromised, PF, 100mcg/0.5mL 03/13/2021, 04/08/2021    Influenza, High Dose (Fluzone 65 yrs and older) 12/06/2020    Influenza, Quadv, adjuvanted, 65 yrs +, IM, PF (Fluad) 11/09/2021    Pneumococcal Polysaccharide ([id number]) 09/08/2020

## 2021-11-16 NOTE — PROGRESS NOTES
Chief Complaint   Patient presents with    Pre-op Exam     lap with Dr. Veronica Riojas 12/02/21    Rash    Hypertension       History obtained from the patient. SUBJECTIVE:  Vassie Boas is a 74 y.o. male that presents today for     -Robotic assist prostatecomty  PREOPERATIVE FAMILY HISTORY  - He reports that he does not have a history of bad reaction to anesthesia. -he does not have a family history of bleeding problems such as hemophilia, or Yossi disease, or von Willebrand disease. PREOPERATIVE ALLERGIES QUESTION:  No Known Allergies  he does not have a history of rash or swelling from exposure to rubber or latex. PREOPERATIVE MEDICATION QUESTION:  No current facility-administered medications for this visit. he has not been taking systemic glucocorticoid this past year    CARDIAC RISK FACTORS  he does not have a history of UA or MI within that past 30 days  he does not have decompensated CHF or significant valvular disease  he does not have a history of significant cardiac arrhythmia. he denies chest pain, exertional dyspnea, orthopnea, palpitations or LE edema. he can walk up 1 flight of stairs or 8 steps without stopping (4 METS)    he does not have a history of CAD. he does not have a history of CHF. he does not have a history of CVA or TIA. he does not have a history of DM requiring insulin. he does not have a history of Renal insufficiency (Cr > 2.0). PREOPERATIVE REVIEW OF SYSTEMS:  he does not have a history of MRSA or VRE. he does not have a history of seizures. he does not have a history of phlebitis or blood clots in arms or legs. he does not have a history of sleep apnea. he  does not have a history of snoring loudly enough to be heard through a closed door.        -Rash LAST VISIT:  In groin area  Been there 3 wks  In lower abdomen and groin  Tried nystatin pwd, did not help. Red, itchy and scaly    Inciting events or exposures prior to rash starting? No  Pruritic? Yes  Erythematous? Yes  Weeping or drainage? No  History of Urticaria? No  Fever? No  Painful? No  New Detergent?   No    UPDATE TODAY:   Improving with diflucan and about gone  Still a bit itchy and dry but redness etc better       -HTN:    HPI:  Improved back on regimen  <140/90    Taking meds as prescribed ?: yes  Tolerating well ?: yes  Side Effects ?: denies  BP at home ?: as above  Working on TLCS ?: yes  Chest Pain/SOB/Palpitations? denies    BP Readings from Last 3 Encounters:   11/17/21 128/64   11/09/21 (!) 142/78   11/05/21 (!) 179/89         Age/Gender Health Maintenance    Lipid -   Lab Results   Component Value Date    CHOL 161 07/13/2021    CHOL 156 01/22/2014     Lab Results   Component Value Date    TRIG 101 07/13/2021    TRIG 141 01/22/2014     Lab Results   Component Value Date    HDL 42 07/13/2021    HDL 35 01/22/2014     Lab Results   Component Value Date    LDLCALC 99 07/13/2021    LDLCALC 93 01/22/2014     No results found for: LABVLDL, VLDL  No results found for: CHOLHDLRATIO      DM Screen -   Lab Results   Component Value Date    GLUCOSE 91 10/20/2021       Colon Cancer Screening - + polyp SEPT 2020, repeat [facility] years [facility] (Age 52 to [de-identified] with 30 pack year hx, current smoker or quit within past 15 years) - never smoker    Tetanus - to get at pharmacy per medicare rules  Influenza Vaccine - UTD FALL 2021  Pneumonia Vaccine - UTD SEPT 2020  Zoster - to get at pharmacy per medicare rules     PSA testing discussion - follows with urology  Lab Results   Component Value Date    PSA 9.58 (H) 08/18/2021    PSA 9.24 (H) 07/13/2021     AAA Screening - never smoker      Current Outpatient Medications   Medication Sig Dispense Refill    fluconazole (DIFLUCAN) 100 MG tablet Take 2 tablets by mouth daily for 10 days 20 tablet 0    lisinopril (PRINIVIL;ZESTRIL) 30 MG tablet Take 1 tablet by mouth daily 90 tablet 3    hydrOXYzine (ATARAX) 50 MG tablet Take 1 tablet by mouth 4 times daily as needed for Itching (Rash and swelling) Caution not at work will cause drowsiness 20 tablet 0    nystatin (MYCOSTATIN) 003538 UNIT/GM powder Apply 3 times daily. 60 g 1    amLODIPine (NORVASC) 10 MG tablet Take 1 tablet by mouth daily 90 tablet 3    tamsulosin (FLOMAX) 0.4 MG capsule Take 1 capsule by mouth daily 90 capsule 3     No current facility-administered medications for this visit. No orders of the defined types were placed in this encounter. All medications reviewed and reconciled, including OTC and herbal medications. Updated list given to patient. Patient Active Problem List    Diagnosis Date Noted    Prostate cancer ([facility] [address] 10/18/2021    Elevated PSA, less than 10 ng/ml 07/13/2021    Hypertension, essential     Obesity (BMI 30-39. 9)     Psoriasis        Past Medical History:   Diagnosis Date    Cancer [facility])     prostate    Hypertension, essential     Obesity (BMI 30-39. 9)     Psoriasis          Past Surgical History:   Procedure Laterality Date    KNEE ARTHROSCOPY      right    PROSTATE BIOPSY  2021    ULNAR TUNNEL RELEASE           No Known Allergies      Social History     Tobacco Use    Smoking status: Never Smoker    Smokeless tobacco: Never Used   Substance Use Topics    Alcohol use: No         Family History   Problem Relation Age of Onset    Heart Disease Father     Colon Cancer Neg Hx     Prostate Cancer Neg Hx          I have reviewed the patient's past medical history, past surgical history, allergies, medications, social and family history and I have made updates where appropriate.       Review of Systems  Positive responses are highlighted in bold    Constitutional:  Fever, Chills, Night Sweats, Fatigue, Unexpected changes in weight  HENT:  Ear pain, Tinnitus, Nosebleeds, Trouble swallowing, Hearing loss, Sore throat  Cardiovascular:  Chest Pain, Palpitations, Orthopnea, Paroxysmal Nocturnal Dyspnea  Respiratory: HCT 46.6 10/20/2021    MCV 88.4 10/20/2021     10/20/2021     Lab Results   Component Value Date     10/20/2021    K 4.5 10/20/2021     10/20/2021    CO2 22 10/20/2021    BUN 14 10/20/2021    CREATININE 0.7 10/20/2021    GLUCOSE 91 10/20/2021    CALCIUM 9.4 10/20/2021        Narrative   PROCEDURE: XR CHEST (2 VW)       CLINICAL INFORMATION: Prostate cancer [facility]), Pre-op testing       COMPARISON: August 27, 2015       TECHNIQUE: PA and lateral views of the chest were obtained.               Impression   1. Lungs somewhat hyperinflated with relative flattening of the hemidiaphragms, consistent with COPD. 2. Mild pleural thickening along the lateral aspect of left lung base and right midlung. 3. No acute findings. No infiltrates or effusions are seen               **This report has been created using voice recognition software.  It may contain minor errors which are inherent in voice recognition technology. **       Final report electronically signed by Dr. Berto Magana on 10/20/2021 1:27 PM       EKG 12 Lead  Order: [id number]   Status: Final result     Visible to patient: Yes (seen)     Next appt: 11/17/2021 at 08:40 AM in Family [facility]     Dx: Prostate cancer ([address] Pre-op testing     0 Result Notes     Ref Range & Units 10/20/21 0955   Ventricular Rate BPM 74    Atrial Rate BPM 74    P-R Interval ms 170    QRS Duration ms 82    Q-T Interval ms 372    QTc Calculation (Bazett) ms 412    P Axis degrees 69    R Axis degrees 75    T Axis degrees 42    Resulting Agency  [facility]             Narrative & Impression    Normal sinus rhythm  Normal ECG  When compared with ECG of 27-AUG-2015 12:48,  No significant change was found  Confirmed by SHARMIN CAMPOS ([id number]) on 10/20/2021 7:00:37 PM             ASSESSMENT & PLAN  1.  Preop examination    Optimized for surgery  Acceptable risk  May proceed to OR  Take norvasc/lisinopril AM of surgery with sip of water  F/u after surgery as needed    2. Prostate cancer ([facility] [address]    F/u uro    3. Tinea cruris    Improving  Finish diflucan  Add petrolatum  If not gone in 3 to 4 wks, call, and will refer to derm    4. Hypertension, essential    Improved  At goal  Con't Norvasc/lisinopril      DISPOSITION    Return in about 1 year (around 11/17/2022) for follow-up on chronic medical conditions, sooner as needed. Varun Auguste released without restrictions. PATIENT COUNSELING    Varun Auguste received counseling on the following healthy behaviors: nutrition, exercise and medication adherence    Barriers to learning and self management: none    Discussed use, benefit, and side effects of prescribed medications. Barriers to medication compliance addressed. All patient questions answered. Pt voiced understanding.      Future Appointments   Date Time Provider Pramod Cristobal   12/22/2021  2:00 PM Britta Mckenzie [address] [place] [address]   1/4/2022  8:00 AM Britta [facility]   1/18/2022  8:00 AM Britta Mckenzie [address] [place] [address]   2/1/2022  8:00 AM Britta Mckenzie [address] [place] [address]       Electronically signed by Dale Herrera DO on 11/17/2021 at 8:59 AM

## 2021-11-17 ENCOUNTER — TELEPHONE (OUTPATIENT)
Dept: UROLOGY | Age: 66
End: 2021-11-17

## 2021-11-17 ENCOUNTER — OFFICE VISIT (OUTPATIENT)
Dept: FAMILY MEDICINE CLINIC | Age: 66
End: 2021-11-17
Payer: MEDICARE

## 2021-11-17 VITALS
SYSTOLIC BLOOD PRESSURE: 128 MMHG | OXYGEN SATURATION: 98 % | TEMPERATURE: 97.9 F | HEIGHT: 68 IN | DIASTOLIC BLOOD PRESSURE: 64 MMHG | BODY MASS INDEX: 41.98 KG/M2 | RESPIRATION RATE: 12 BRPM | HEART RATE: 75 BPM | WEIGHT: 277 LBS

## 2021-11-17 DIAGNOSIS — I10 HYPERTENSION, ESSENTIAL: ICD-10-CM

## 2021-11-17 DIAGNOSIS — B35.6 TINEA CRURIS: ICD-10-CM

## 2021-11-17 DIAGNOSIS — Z01.818 PREOP EXAMINATION: Primary | ICD-10-CM

## 2021-11-17 DIAGNOSIS — C61 PROSTATE CANCER (HCC): ICD-10-CM

## 2021-11-17 PROCEDURE — 1123F ACP DISCUSS/DSCN MKR DOCD: CPT | Performed by: FAMILY MEDICINE

## 2021-11-17 PROCEDURE — 1036F TOBACCO NON-USER: CPT | Performed by: FAMILY MEDICINE

## 2021-11-17 PROCEDURE — G8427 DOCREV CUR MEDS BY ELIG CLIN: HCPCS | Performed by: FAMILY MEDICINE

## 2021-11-17 PROCEDURE — 3017F COLORECTAL CA SCREEN DOC REV: CPT | Performed by: FAMILY MEDICINE

## 2021-11-17 PROCEDURE — 99214 OFFICE O/P EST MOD 30 MIN: CPT | Performed by: FAMILY MEDICINE

## 2021-11-17 PROCEDURE — 4040F PNEUMOC VAC/ADMIN/RCVD: CPT | Performed by: FAMILY MEDICINE

## 2021-11-17 PROCEDURE — G8484 FLU IMMUNIZE NO ADMIN: HCPCS | Performed by: FAMILY MEDICINE

## 2021-11-17 PROCEDURE — G8417 CALC BMI ABV UP PARAM F/U: HCPCS | Performed by: FAMILY MEDICINE

## 2021-11-17 NOTE — TELEPHONE ENCOUNTER
Patient notified of new surgery arrival time. Patient is to be at [address] [place] [address] Same day surgery by  10:00 am   on  12/2/21    Patient reminded to have nothing to eat or drink after midnight. Patient voiced understanding.

## 2021-11-19 ENCOUNTER — NURSE ONLY (OUTPATIENT)
Dept: LAB | Age: 66
End: 2021-11-19

## 2021-11-19 DIAGNOSIS — Z01.818 PRE-OP TESTING: ICD-10-CM

## 2021-11-19 DIAGNOSIS — C61 PROSTATE CANCER (HCC): ICD-10-CM

## 2021-11-19 DIAGNOSIS — R33.9 RETENTION OF URINE: ICD-10-CM

## 2021-11-21 LAB
ORGANISM: ABNORMAL
ORGANISM: ABNORMAL
URINE CULTURE, ROUTINE: ABNORMAL
URINE CULTURE, ROUTINE: ABNORMAL

## 2021-11-21 RX ORDER — LEVOFLOXACIN 500 MG/1
500 TABLET, FILM COATED ORAL DAILY
Qty: 7 TABLET | Refills: 0 | Status: SHIPPED | OUTPATIENT
Start: 2021-11-21 | End: 2021-11-28

## 2021-11-22 ENCOUNTER — TELEPHONE (OUTPATIENT)
Dept: UROLOGY | Age: 66
End: 2021-11-22

## 2021-11-22 NOTE — TELEPHONE ENCOUNTER
----- Message from Sindi Henriquez MD sent at 11/21/2021  7:07 PM EST -----  Levaquin sent to pharmacy

## 2021-11-22 NOTE — TELEPHONE ENCOUNTER
Ej Smith on [employer] advised of the urine results and levaquin was sent to the pharmacy for treatment. She voiced understanding.

## 2021-11-24 NOTE — PROGRESS NOTES
Following instructions given to wife/significant other, who states understanding:    NPO after midnight  [employer] and 's license  Wear comfortable clean clothing  Do not bring jewelry   Shower night before and morning of surgery with a liquid antibacterial soap  Bring medications in original bottles  Follow all instructions given by your physician   needed at discharge  Call [phone] for any questions  Report to SDS on 2nd floor  If you would become ill prior to surgery, please call the surgeon  May have a visitor with you, we request that you limit to 2 visitors in pre-op area  Please bring and wear mask

## 2021-12-02 ENCOUNTER — ANESTHESIA EVENT (OUTPATIENT)
Dept: OPERATING ROOM | Age: 66
End: 2021-12-02
Payer: MEDICARE

## 2021-12-02 ENCOUNTER — HOSPITAL ENCOUNTER (OUTPATIENT)
Age: 66
Discharge: HOSPICE | End: 2021-12-04
Attending: UROLOGY | Admitting: UROLOGY
Payer: MEDICARE

## 2021-12-02 ENCOUNTER — ANESTHESIA (OUTPATIENT)
Dept: OPERATING ROOM | Age: 66
End: 2021-12-02
Payer: MEDICARE

## 2021-12-02 VITALS — SYSTOLIC BLOOD PRESSURE: 106 MMHG | DIASTOLIC BLOOD PRESSURE: 69 MMHG | OXYGEN SATURATION: 92 % | TEMPERATURE: 70 F

## 2021-12-02 DIAGNOSIS — C61 PROSTATE CANCER (HCC): Primary | ICD-10-CM

## 2021-12-02 LAB
ANION GAP SERPL CALCULATED.3IONS-SCNC: 15 MEQ/L (ref 8–16)
BASOPHILS # BLD: 0.3 %
BASOPHILS ABSOLUTE: 0 THOU/MM3 (ref 0–0.1)
BUN BLDV-MCNC: 21 MG/DL (ref 7–22)
CALCIUM SERPL-MCNC: 8.8 MG/DL (ref 8.5–10.5)
CHLORIDE BLD-SCNC: 103 MEQ/L (ref 98–111)
CO2: 21 MEQ/L (ref 23–33)
CREAT SERPL-MCNC: 1.2 MG/DL (ref 0.4–1.2)
EOSINOPHIL # BLD: 0.1 %
EOSINOPHILS ABSOLUTE: 0 THOU/MM3 (ref 0–0.4)
ERYTHROCYTE [DISTWIDTH] IN BLOOD BY AUTOMATED COUNT: 13.8 % (ref 11.5–14.5)
ERYTHROCYTE [DISTWIDTH] IN BLOOD BY AUTOMATED COUNT: 44.9 FL (ref 35–45)
GFR SERPL CREATININE-BSD FRML MDRD: 60 ML/MIN/1.73M2
GLUCOSE BLD-MCNC: 131 MG/DL (ref 70–108)
HCT VFR BLD CALC: 42.6 % (ref 42–52)
HEMOGLOBIN: 14 GM/DL (ref 14–18)
IMMATURE GRANS (ABS): 0.04 THOU/MM3 (ref 0–0.07)
IMMATURE GRANULOCYTES: 0.4 %
LYMPHOCYTES # BLD: 6.1 %
LYMPHOCYTES ABSOLUTE: 0.7 THOU/MM3 (ref 1–4.8)
MCH RBC QN AUTO: 29.5 PG (ref 26–33)
MCHC RBC AUTO-ENTMCNC: 32.9 GM/DL (ref 32.2–35.5)
MCV RBC AUTO: 89.7 FL (ref 80–94)
MONOCYTES # BLD: 7.6 %
MONOCYTES ABSOLUTE: 0.8 THOU/MM3 (ref 0.4–1.3)
NUCLEATED RED BLOOD CELLS: 0 /100 WBC
PLATELET # BLD: 269 THOU/MM3 (ref 130–400)
PMV BLD AUTO: 9.3 FL (ref 9.4–12.4)
POTASSIUM SERPL-SCNC: 5 MEQ/L (ref 3.5–5.2)
RBC # BLD: 4.75 MILL/MM3 (ref 4.7–6.1)
SEG NEUTROPHILS: 85.5 %
SEGMENTED NEUTROPHILS ABSOLUTE COUNT: 9.2 THOU/MM3 (ref 1.8–7.7)
SODIUM BLD-SCNC: 139 MEQ/L (ref 135–145)
WBC # BLD: 10.8 THOU/MM3 (ref 4.8–10.8)

## 2021-12-02 PROCEDURE — 2580000003 HC RX 258: Performed by: UROLOGY

## 2021-12-02 PROCEDURE — 2500000003 HC RX 250 WO HCPCS

## 2021-12-02 PROCEDURE — 7100000001 HC PACU RECOVERY - ADDTL 15 MIN: Performed by: UROLOGY

## 2021-12-02 PROCEDURE — 80048 BASIC METABOLIC PNL TOTAL CA: CPT

## 2021-12-02 PROCEDURE — 6360000002 HC RX W HCPCS: Performed by: UROLOGY

## 2021-12-02 PROCEDURE — 6360000002 HC RX W HCPCS: Performed by: NURSE ANESTHETIST, CERTIFIED REGISTERED

## 2021-12-02 PROCEDURE — 7100000000 HC PACU RECOVERY - FIRST 15 MIN: Performed by: UROLOGY

## 2021-12-02 PROCEDURE — 2500000003 HC RX 250 WO HCPCS: Performed by: UROLOGY

## 2021-12-02 PROCEDURE — 6370000000 HC RX 637 (ALT 250 FOR IP): Performed by: PHYSICIAN ASSISTANT

## 2021-12-02 PROCEDURE — S2900 ROBOTIC SURGICAL SYSTEM: HCPCS | Performed by: UROLOGY

## 2021-12-02 PROCEDURE — 88307 TISSUE EXAM BY PATHOLOGIST: CPT

## 2021-12-02 PROCEDURE — 2709999900 HC NON-CHARGEABLE SUPPLY: Performed by: UROLOGY

## 2021-12-02 PROCEDURE — 88309 TISSUE EXAM BY PATHOLOGIST: CPT

## 2021-12-02 PROCEDURE — 3700000001 HC ADD 15 MINUTES (ANESTHESIA): Performed by: UROLOGY

## 2021-12-02 PROCEDURE — 55866 LAPS SURG PRST8ECT RPBIC RAD: CPT | Performed by: PHYSICIAN ASSISTANT

## 2021-12-02 PROCEDURE — 85025 COMPLETE CBC W/AUTO DIFF WBC: CPT

## 2021-12-02 PROCEDURE — 6360000002 HC RX W HCPCS: Performed by: ANESTHESIOLOGY

## 2021-12-02 PROCEDURE — 36415 COLL VENOUS BLD VENIPUNCTURE: CPT

## 2021-12-02 PROCEDURE — 6360000002 HC RX W HCPCS

## 2021-12-02 PROCEDURE — 38571 LAPAROSCOPY LYMPHADENECTOMY: CPT | Performed by: PHYSICIAN ASSISTANT

## 2021-12-02 PROCEDURE — 3600000019 HC SURGERY ROBOT ADDTL 15MIN: Performed by: UROLOGY

## 2021-12-02 PROCEDURE — 3600000009 HC SURGERY ROBOT BASE: Performed by: UROLOGY

## 2021-12-02 PROCEDURE — 2500000003 HC RX 250 WO HCPCS: Performed by: NURSE ANESTHETIST, CERTIFIED REGISTERED

## 2021-12-02 PROCEDURE — 2500000003 HC RX 250 WO HCPCS: Performed by: PHYSICIAN ASSISTANT

## 2021-12-02 PROCEDURE — 6360000002 HC RX W HCPCS: Performed by: PHYSICIAN ASSISTANT

## 2021-12-02 PROCEDURE — 3700000000 HC ANESTHESIA ATTENDED CARE: Performed by: UROLOGY

## 2021-12-02 DEVICE — ALLOGRAFT HUM TISS 6X3 CM CRYOPRESERVED AMNIOX CLARIX CRD 1K: Type: IMPLANTABLE DEVICE | Status: FUNCTIONAL

## 2021-12-02 RX ORDER — ONDANSETRON 2 MG/ML
4 INJECTION INTRAMUSCULAR; INTRAVENOUS EVERY 6 HOURS PRN
Status: DISCONTINUED | OUTPATIENT
Start: 2021-12-02 | End: 2021-12-04 | Stop reason: HOSPADM

## 2021-12-02 RX ORDER — SODIUM CHLORIDE 9 MG/ML
INJECTION, SOLUTION INTRAVENOUS CONTINUOUS
Status: DISCONTINUED | OUTPATIENT
Start: 2021-12-02 | End: 2021-12-03

## 2021-12-02 RX ORDER — MORPHINE SULFATE 2 MG/ML
2 INJECTION, SOLUTION INTRAMUSCULAR; INTRAVENOUS
Status: DISCONTINUED | OUTPATIENT
Start: 2021-12-02 | End: 2021-12-04 | Stop reason: HOSPADM

## 2021-12-02 RX ORDER — ONDANSETRON 4 MG/1
4 TABLET, ORALLY DISINTEGRATING ORAL EVERY 8 HOURS PRN
Status: DISCONTINUED | OUTPATIENT
Start: 2021-12-02 | End: 2021-12-04 | Stop reason: HOSPADM

## 2021-12-02 RX ORDER — METOCLOPRAMIDE HYDROCHLORIDE 5 MG/ML
10 INJECTION INTRAMUSCULAR; INTRAVENOUS
Status: DISCONTINUED | OUTPATIENT
Start: 2021-12-02 | End: 2021-12-02 | Stop reason: HOSPADM

## 2021-12-02 RX ORDER — ROCURONIUM BROMIDE 10 MG/ML
INJECTION, SOLUTION INTRAVENOUS PRN
Status: DISCONTINUED | OUTPATIENT
Start: 2021-12-02 | End: 2021-12-02 | Stop reason: SDUPTHER

## 2021-12-02 RX ORDER — FENTANYL CITRATE 50 UG/ML
50 INJECTION, SOLUTION INTRAMUSCULAR; INTRAVENOUS EVERY 5 MIN PRN
Status: DISCONTINUED | OUTPATIENT
Start: 2021-12-02 | End: 2021-12-02 | Stop reason: HOSPADM

## 2021-12-02 RX ORDER — MEPERIDINE HYDROCHLORIDE 25 MG/ML
12.5 INJECTION INTRAMUSCULAR; INTRAVENOUS; SUBCUTANEOUS EVERY 5 MIN PRN
Status: DISCONTINUED | OUTPATIENT
Start: 2021-12-02 | End: 2021-12-02 | Stop reason: HOSPADM

## 2021-12-02 RX ORDER — DIPHENHYDRAMINE HYDROCHLORIDE 50 MG/ML
12.5 INJECTION INTRAMUSCULAR; INTRAVENOUS
Status: DISCONTINUED | OUTPATIENT
Start: 2021-12-02 | End: 2021-12-02 | Stop reason: HOSPADM

## 2021-12-02 RX ORDER — BUPIVACAINE HYDROCHLORIDE 5 MG/ML
INJECTION, SOLUTION EPIDURAL; INTRACAUDAL PRN
Status: DISCONTINUED | OUTPATIENT
Start: 2021-12-02 | End: 2021-12-02 | Stop reason: ALTCHOICE

## 2021-12-02 RX ORDER — BACITRACIN, NEOMYCIN, POLYMYXIN B 400; 3.5; 5 [USP'U]/G; MG/G; [USP'U]/G
OINTMENT TOPICAL 2 TIMES DAILY
Status: DISCONTINUED | OUTPATIENT
Start: 2021-12-02 | End: 2021-12-04 | Stop reason: HOSPADM

## 2021-12-02 RX ORDER — LABETALOL 20 MG/4 ML (5 MG/ML) INTRAVENOUS SYRINGE
5 EVERY 10 MIN PRN
Status: DISCONTINUED | OUTPATIENT
Start: 2021-12-02 | End: 2021-12-02 | Stop reason: HOSPADM

## 2021-12-02 RX ORDER — SODIUM CHLORIDE 9 MG/ML
INJECTION, SOLUTION INTRAVENOUS CONTINUOUS
Status: DISCONTINUED | OUTPATIENT
Start: 2021-12-02 | End: 2021-12-02

## 2021-12-02 RX ORDER — FENTANYL CITRATE 50 UG/ML
INJECTION, SOLUTION INTRAMUSCULAR; INTRAVENOUS PRN
Status: DISCONTINUED | OUTPATIENT
Start: 2021-12-02 | End: 2021-12-02 | Stop reason: SDUPTHER

## 2021-12-02 RX ORDER — HYDROCODONE BITARTRATE AND ACETAMINOPHEN 5; 325 MG/1; MG/1
1 TABLET ORAL EVERY 4 HOURS PRN
Status: DISCONTINUED | OUTPATIENT
Start: 2021-12-02 | End: 2021-12-04 | Stop reason: HOSPADM

## 2021-12-02 RX ORDER — PROPOFOL 10 MG/ML
INJECTION, EMULSION INTRAVENOUS PRN
Status: DISCONTINUED | OUTPATIENT
Start: 2021-12-02 | End: 2021-12-02 | Stop reason: SDUPTHER

## 2021-12-02 RX ORDER — SODIUM CHLORIDE 0.9 % (FLUSH) 0.9 %
5-40 SYRINGE (ML) INJECTION PRN
Status: DISCONTINUED | OUTPATIENT
Start: 2021-12-02 | End: 2021-12-04 | Stop reason: HOSPADM

## 2021-12-02 RX ORDER — FENTANYL CITRATE 50 UG/ML
25 INJECTION, SOLUTION INTRAMUSCULAR; INTRAVENOUS EVERY 5 MIN PRN
Status: DISCONTINUED | OUTPATIENT
Start: 2021-12-02 | End: 2021-12-02 | Stop reason: HOSPADM

## 2021-12-02 RX ORDER — ONDANSETRON 2 MG/ML
INJECTION INTRAMUSCULAR; INTRAVENOUS PRN
Status: DISCONTINUED | OUTPATIENT
Start: 2021-12-02 | End: 2021-12-02 | Stop reason: SDUPTHER

## 2021-12-02 RX ORDER — SENNA AND DOCUSATE SODIUM 50; 8.6 MG/1; MG/1
1 TABLET, FILM COATED ORAL 2 TIMES DAILY
Status: DISCONTINUED | OUTPATIENT
Start: 2021-12-02 | End: 2021-12-04 | Stop reason: HOSPADM

## 2021-12-02 RX ORDER — HYDROCODONE BITARTRATE AND ACETAMINOPHEN 5; 325 MG/1; MG/1
2 TABLET ORAL EVERY 4 HOURS PRN
Status: DISCONTINUED | OUTPATIENT
Start: 2021-12-02 | End: 2021-12-04 | Stop reason: HOSPADM

## 2021-12-02 RX ORDER — SUCCINYLCHOLINE CHLORIDE 20 MG/ML
INJECTION INTRAMUSCULAR; INTRAVENOUS PRN
Status: DISCONTINUED | OUTPATIENT
Start: 2021-12-02 | End: 2021-12-02 | Stop reason: SDUPTHER

## 2021-12-02 RX ORDER — MORPHINE SULFATE 4 MG/ML
4 INJECTION, SOLUTION INTRAMUSCULAR; INTRAVENOUS
Status: DISCONTINUED | OUTPATIENT
Start: 2021-12-02 | End: 2021-12-04 | Stop reason: HOSPADM

## 2021-12-02 RX ORDER — PROMETHAZINE HYDROCHLORIDE 25 MG/ML
12.5 INJECTION, SOLUTION INTRAMUSCULAR; INTRAVENOUS
Status: DISCONTINUED | OUTPATIENT
Start: 2021-12-02 | End: 2021-12-02 | Stop reason: HOSPADM

## 2021-12-02 RX ORDER — TROSPIUM CHLORIDE 20 MG/1
20 TABLET, FILM COATED ORAL
Status: DISCONTINUED | OUTPATIENT
Start: 2021-12-03 | End: 2021-12-04

## 2021-12-02 RX ORDER — EPHEDRINE SULFATE/0.9% NACL/PF 50 MG/5 ML
SYRINGE (ML) INTRAVENOUS PRN
Status: DISCONTINUED | OUTPATIENT
Start: 2021-12-02 | End: 2021-12-02 | Stop reason: SDUPTHER

## 2021-12-02 RX ORDER — HYDROXYZINE HYDROCHLORIDE 25 MG/1
50 TABLET, FILM COATED ORAL 4 TIMES DAILY PRN
Status: DISCONTINUED | OUTPATIENT
Start: 2021-12-02 | End: 2021-12-04 | Stop reason: HOSPADM

## 2021-12-02 RX ORDER — SODIUM CHLORIDE 9 MG/ML
25 INJECTION, SOLUTION INTRAVENOUS PRN
Status: DISCONTINUED | OUTPATIENT
Start: 2021-12-02 | End: 2021-12-04 | Stop reason: HOSPADM

## 2021-12-02 RX ORDER — LIDOCAINE HYDROCHLORIDE 20 MG/ML
INJECTION, SOLUTION INTRAVENOUS PRN
Status: DISCONTINUED | OUTPATIENT
Start: 2021-12-02 | End: 2021-12-02 | Stop reason: SDUPTHER

## 2021-12-02 RX ORDER — SODIUM CHLORIDE 0.9 % (FLUSH) 0.9 %
5-40 SYRINGE (ML) INJECTION EVERY 12 HOURS SCHEDULED
Status: DISCONTINUED | OUTPATIENT
Start: 2021-12-02 | End: 2021-12-04 | Stop reason: HOSPADM

## 2021-12-02 RX ORDER — AMLODIPINE BESYLATE 10 MG/1
10 TABLET ORAL DAILY
Status: DISCONTINUED | OUTPATIENT
Start: 2021-12-02 | End: 2021-12-04 | Stop reason: HOSPADM

## 2021-12-02 RX ORDER — ACETAMINOPHEN 325 MG/1
650 TABLET ORAL EVERY 6 HOURS
Status: DISCONTINUED | OUTPATIENT
Start: 2021-12-02 | End: 2021-12-04 | Stop reason: HOSPADM

## 2021-12-02 RX ADMIN — FENTANYL CITRATE 50 MCG: 50 INJECTION INTRAMUSCULAR; INTRAVENOUS at 15:41

## 2021-12-02 RX ADMIN — Medication 10 MG: at 15:01

## 2021-12-02 RX ADMIN — Medication 3000 MG: at 13:16

## 2021-12-02 RX ADMIN — MORPHINE SULFATE 4 MG: 4 INJECTION, SOLUTION INTRAMUSCULAR; INTRAVENOUS at 19:36

## 2021-12-02 RX ADMIN — ROCURONIUM BROMIDE 40 MG: 10 INJECTION INTRAVENOUS at 13:14

## 2021-12-02 RX ADMIN — ROCURONIUM BROMIDE 20 MG: 10 INJECTION INTRAVENOUS at 14:12

## 2021-12-02 RX ADMIN — ROCURONIUM BROMIDE 10 MG: 10 INJECTION INTRAVENOUS at 13:24

## 2021-12-02 RX ADMIN — ONDANSETRON HYDROCHLORIDE 4 MG: 4 INJECTION, SOLUTION INTRAMUSCULAR; INTRAVENOUS at 16:40

## 2021-12-02 RX ADMIN — MICONAZOLE NITRATE: 20 POWDER TOPICAL at 21:47

## 2021-12-02 RX ADMIN — DOCUSATE SODIUM 50 MG AND SENNOSIDES 8.6 MG 1 TABLET: 8.6; 5 TABLET, FILM COATED ORAL at 20:03

## 2021-12-02 RX ADMIN — ROCURONIUM BROMIDE 20 MG: 10 INJECTION INTRAVENOUS at 16:08

## 2021-12-02 RX ADMIN — SUGAMMADEX 200 MG: 100 INJECTION, SOLUTION INTRAVENOUS at 17:16

## 2021-12-02 RX ADMIN — FENTANYL CITRATE 50 MCG: 0.05 INJECTION, SOLUTION INTRAMUSCULAR; INTRAVENOUS at 17:50

## 2021-12-02 RX ADMIN — SODIUM CHLORIDE: 9 INJECTION, SOLUTION INTRAVENOUS at 15:02

## 2021-12-02 RX ADMIN — FENTANYL CITRATE 50 MCG: 50 INJECTION INTRAMUSCULAR; INTRAVENOUS at 17:20

## 2021-12-02 RX ADMIN — ROCURONIUM BROMIDE 10 MG: 10 INJECTION INTRAVENOUS at 14:49

## 2021-12-02 RX ADMIN — PROPOFOL 20 MG: 10 INJECTION, EMULSION INTRAVENOUS at 17:20

## 2021-12-02 RX ADMIN — MORPHINE SULFATE 4 MG: 4 INJECTION, SOLUTION INTRAMUSCULAR; INTRAVENOUS at 22:33

## 2021-12-02 RX ADMIN — BACITRACIN ZINC NEOMYCIN SULFATE POLYMYXIN B SULFATE: 400; 3.5; 5 OINTMENT TOPICAL at 21:47

## 2021-12-02 RX ADMIN — PROPOFOL 180 MG: 10 INJECTION, EMULSION INTRAVENOUS at 12:57

## 2021-12-02 RX ADMIN — FENTANYL CITRATE 100 MCG: 50 INJECTION INTRAMUSCULAR; INTRAVENOUS at 12:57

## 2021-12-02 RX ADMIN — ROCURONIUM BROMIDE 20 MG: 10 INJECTION INTRAVENOUS at 15:05

## 2021-12-02 RX ADMIN — SUCCINYLCHOLINE CHLORIDE 120 MG: 20 INJECTION, SOLUTION INTRAMUSCULAR; INTRAVENOUS at 12:57

## 2021-12-02 RX ADMIN — ROCURONIUM BROMIDE 20 MG: 10 INJECTION INTRAVENOUS at 13:26

## 2021-12-02 RX ADMIN — SODIUM CHLORIDE: 9 INJECTION, SOLUTION INTRAVENOUS at 12:14

## 2021-12-02 RX ADMIN — Medication 5 MG: at 15:18

## 2021-12-02 RX ADMIN — ACETAMINOPHEN 650 MG: 325 TABLET ORAL at 20:03

## 2021-12-02 RX ADMIN — LIDOCAINE HYDROCHLORIDE 100 MG: 20 INJECTION, SOLUTION INTRAVENOUS at 12:57

## 2021-12-02 ASSESSMENT — PULMONARY FUNCTION TESTS
PIF_VALUE: 25
PIF_VALUE: 32
PIF_VALUE: 25
PIF_VALUE: 35
PIF_VALUE: 34
PIF_VALUE: 15
PIF_VALUE: 17
PIF_VALUE: 31
PIF_VALUE: 18
PIF_VALUE: 33
PIF_VALUE: 1
PIF_VALUE: 34
PIF_VALUE: 33
PIF_VALUE: 31
PIF_VALUE: 31
PIF_VALUE: 35
PIF_VALUE: 36
PIF_VALUE: 17
PIF_VALUE: 32
PIF_VALUE: 35
PIF_VALUE: 31
PIF_VALUE: 35
PIF_VALUE: 32
PIF_VALUE: 31
PIF_VALUE: 25
PIF_VALUE: 33
PIF_VALUE: 31
PIF_VALUE: 34
PIF_VALUE: 35
PIF_VALUE: 34
PIF_VALUE: 20
PIF_VALUE: 22
PIF_VALUE: 34
PIF_VALUE: 35
PIF_VALUE: 1
PIF_VALUE: 35
PIF_VALUE: 0
PIF_VALUE: 30
PIF_VALUE: 25
PIF_VALUE: 32
PIF_VALUE: 17
PIF_VALUE: 23
PIF_VALUE: 35
PIF_VALUE: 33
PIF_VALUE: 33
PIF_VALUE: 18
PIF_VALUE: 30
PIF_VALUE: 32
PIF_VALUE: 32
PIF_VALUE: 25
PIF_VALUE: 33
PIF_VALUE: 34
PIF_VALUE: 31
PIF_VALUE: 32
PIF_VALUE: 32
PIF_VALUE: 35
PIF_VALUE: 33
PIF_VALUE: 28
PIF_VALUE: 31
PIF_VALUE: 18
PIF_VALUE: 33
PIF_VALUE: 35
PIF_VALUE: 21
PIF_VALUE: 34
PIF_VALUE: 1
PIF_VALUE: 5
PIF_VALUE: 33
PIF_VALUE: 22
PIF_VALUE: 34
PIF_VALUE: 33
PIF_VALUE: 32
PIF_VALUE: 18
PIF_VALUE: 30
PIF_VALUE: 33
PIF_VALUE: 33
PIF_VALUE: 34
PIF_VALUE: 33
PIF_VALUE: 31
PIF_VALUE: 2
PIF_VALUE: 33
PIF_VALUE: 0
PIF_VALUE: 18
PIF_VALUE: 30
PIF_VALUE: 26
PIF_VALUE: 34
PIF_VALUE: 32
PIF_VALUE: 33
PIF_VALUE: 32
PIF_VALUE: 33
PIF_VALUE: 33
PIF_VALUE: 31
PIF_VALUE: 31
PIF_VALUE: 32
PIF_VALUE: 30
PIF_VALUE: 25
PIF_VALUE: 1
PIF_VALUE: 31
PIF_VALUE: 26
PIF_VALUE: 31
PIF_VALUE: 29
PIF_VALUE: 30
PIF_VALUE: 0
PIF_VALUE: 32
PIF_VALUE: 31
PIF_VALUE: 32
PIF_VALUE: 33
PIF_VALUE: 31
PIF_VALUE: 34
PIF_VALUE: 35
PIF_VALUE: 25
PIF_VALUE: 31
PIF_VALUE: 30
PIF_VALUE: 16
PIF_VALUE: 6
PIF_VALUE: 30
PIF_VALUE: 33
PIF_VALUE: 0
PIF_VALUE: 31
PIF_VALUE: 33
PIF_VALUE: 32
PIF_VALUE: 33
PIF_VALUE: 33
PIF_VALUE: 29
PIF_VALUE: 1
PIF_VALUE: 35
PIF_VALUE: 31
PIF_VALUE: 33
PIF_VALUE: 5
PIF_VALUE: 32
PIF_VALUE: 22
PIF_VALUE: 33
PIF_VALUE: 31
PIF_VALUE: 3
PIF_VALUE: 21
PIF_VALUE: 24
PIF_VALUE: 31
PIF_VALUE: 34
PIF_VALUE: 31
PIF_VALUE: 34
PIF_VALUE: 33
PIF_VALUE: 35
PIF_VALUE: 33
PIF_VALUE: 35
PIF_VALUE: 31
PIF_VALUE: 27
PIF_VALUE: 21
PIF_VALUE: 35
PIF_VALUE: 32
PIF_VALUE: 33
PIF_VALUE: 28
PIF_VALUE: 33
PIF_VALUE: 33
PIF_VALUE: 21
PIF_VALUE: 35
PIF_VALUE: 33
PIF_VALUE: 35
PIF_VALUE: 30
PIF_VALUE: 32
PIF_VALUE: 32
PIF_VALUE: 22
PIF_VALUE: 35
PIF_VALUE: 1
PIF_VALUE: 21
PIF_VALUE: 33
PIF_VALUE: 6
PIF_VALUE: 35
PIF_VALUE: 33
PIF_VALUE: 32
PIF_VALUE: 18
PIF_VALUE: 32
PIF_VALUE: 34
PIF_VALUE: 30
PIF_VALUE: 35
PIF_VALUE: 35
PIF_VALUE: 15
PIF_VALUE: 34
PIF_VALUE: 34
PIF_VALUE: 0
PIF_VALUE: 16
PIF_VALUE: 34
PIF_VALUE: 33
PIF_VALUE: 15
PIF_VALUE: 35
PIF_VALUE: 32
PIF_VALUE: 31
PIF_VALUE: 33
PIF_VALUE: 26
PIF_VALUE: 31
PIF_VALUE: 30
PIF_VALUE: 33
PIF_VALUE: 33
PIF_VALUE: 30
PIF_VALUE: 21
PIF_VALUE: 24
PIF_VALUE: 33
PIF_VALUE: 31
PIF_VALUE: 33
PIF_VALUE: 27
PIF_VALUE: 35
PIF_VALUE: 33
PIF_VALUE: 30
PIF_VALUE: 22
PIF_VALUE: 17
PIF_VALUE: 22
PIF_VALUE: 35
PIF_VALUE: 17
PIF_VALUE: 32
PIF_VALUE: 33
PIF_VALUE: 32
PIF_VALUE: 34
PIF_VALUE: 33
PIF_VALUE: 21
PIF_VALUE: 32
PIF_VALUE: 35
PIF_VALUE: 33
PIF_VALUE: 35
PIF_VALUE: 33
PIF_VALUE: 15
PIF_VALUE: 34
PIF_VALUE: 19
PIF_VALUE: 33
PIF_VALUE: 30
PIF_VALUE: 18
PIF_VALUE: 35
PIF_VALUE: 32
PIF_VALUE: 35
PIF_VALUE: 25
PIF_VALUE: 34
PIF_VALUE: 25
PIF_VALUE: 21
PIF_VALUE: 31
PIF_VALUE: 18
PIF_VALUE: 4
PIF_VALUE: 16
PIF_VALUE: 1
PIF_VALUE: 4
PIF_VALUE: 31
PIF_VALUE: 16
PIF_VALUE: 34
PIF_VALUE: 30
PIF_VALUE: 33
PIF_VALUE: 34
PIF_VALUE: 33
PIF_VALUE: 32
PIF_VALUE: 34
PIF_VALUE: 15
PIF_VALUE: 21
PIF_VALUE: 35
PIF_VALUE: 25
PIF_VALUE: 33
PIF_VALUE: 27
PIF_VALUE: 34
PIF_VALUE: 30
PIF_VALUE: 17
PIF_VALUE: 19
PIF_VALUE: 31
PIF_VALUE: 31
PIF_VALUE: 35
PIF_VALUE: 34
PIF_VALUE: 34
PIF_VALUE: 35
PIF_VALUE: 32
PIF_VALUE: 33
PIF_VALUE: 30
PIF_VALUE: 32
PIF_VALUE: 30
PIF_VALUE: 34
PIF_VALUE: 33
PIF_VALUE: 32
PIF_VALUE: 34
PIF_VALUE: 35
PIF_VALUE: 1
PIF_VALUE: 15
PIF_VALUE: 34
PIF_VALUE: 33
PIF_VALUE: 34
PIF_VALUE: 31

## 2021-12-02 ASSESSMENT — PAIN DESCRIPTION - PAIN TYPE: TYPE: ACUTE PAIN

## 2021-12-02 ASSESSMENT — PAIN SCALES - GENERAL
PAINLEVEL_OUTOF10: 8
PAINLEVEL_OUTOF10: 7
PAINLEVEL_OUTOF10: 0
PAINLEVEL_OUTOF10: 6

## 2021-12-02 ASSESSMENT — PAIN DESCRIPTION - LOCATION: LOCATION: GROIN

## 2021-12-02 ASSESSMENT — PAIN DESCRIPTION - ORIENTATION: ORIENTATION: RIGHT;LEFT

## 2021-12-02 NOTE — BRIEF OP NOTE
Brief Postoperative Note      Patient: Mortimer Parkin  YOB: 1955  MRN: [id number]    Date of Procedure: 12/2/2021    Pre-Op Diagnosis: PROSTATE CANCER    Post-Op Diagnosis: Same       Procedure(s):  ROBOTIC ASSISTED LAPAROSCOPIC RADICAL PROSTECTOMY WITH BILATERAL LYMPH NODE DISSECTION    Surgeon(s):  Andrey Kelley MD    Assistant: Lynsey Haro PA-C    Anesthesia: General    Estimated Blood Loss (mL): 757 mL    Complications: None    Specimens:   ID Type Source Tests Collected by Time Destination   A : PROSTATE, SEMINAL VESICLES, BILATERAL PELVIC LYMPH NODES Tissue Prostate SURGICAL PATHOLOGY Andrey Kelley MD 12/2/2021 1043    B : Mallory Purchase LYMPH NODES Tissue Prostate SURGICAL PATHOLOGY Andrey Kelley MD 12/2/2021 1044        Implants:  Implant Name Type Inv.  Item Serial No.  Lot No. LRB No. Used Action   ALLOGRAFT HUM TISS 6X3 CM CRYOPRESERVED AMNIOX CLARIX CRD 1K - [id number]  ALLOGRAFT HUM TISS 6X3 CM CRYOPRESERVED AMNIOX CLARIX CRD 1K [id number] TISSUE [employer]   1 Implanted         Drains:   Urethral Catheter (Active)       Findings: See dictated operative note    Electronically signed by Kim Hammond PA-C on 12/2/2021 at 5:28 PM

## 2021-12-02 NOTE — PROGRESS NOTES
1732: Pt arrives to pacu, responds to verbal stimuli and quickly falls back to sleep. Pt on 6L simple mask, respirations easy and unlabored. VSS  1750: Pt c/o pain 7/10, 50mcg of fentanyl given   1755: Pt sleeping at this time, easily awakens to voice. Noted improvement in pain   1810: Pt meets criteria for discharge from pacu. VSS  1820: Provided report to [address] [place] [address] on 6E 1836: Pt transported to  [address] via transport in stable condition.

## 2021-12-02 NOTE — PROGRESS NOTES
Patient arrived to 6E66 by bed. Patient drowsy,but awakens to verbal stimuli. States pain is 8/10, but quickly falls back asleep. Nasal cannula at 2L, 97% spo2. Lungs clear. Abdomen soft , hypo bowel sounds. Umbilcus site with small amount of bloody drainage noted. Abraham in place draining bloody urine. 0.9NS infusing into left hand at 100 ml/hr. Call light within reach. Bed alarm turned on. Wife at bedside.

## 2021-12-02 NOTE — FLOWSHEET NOTE
Pt admitted to [facility] room 18 and oriented to unit. SCD sleeves applied. Nares swabbed. Pt verbalized permission for first name, last initial and physicians name on white board. SDS board and discharge criteria explained, pt and family verbalized understanding. Pt denies thoughts of harming self or others. Call light in reach. S/O Tiffanie at the bedside.

## 2021-12-02 NOTE — ANESTHESIA PRE PROCEDURE
Department of Anesthesiology  Preprocedure Note       Name:  Trisha Tate   Age:  74 y.o.  :  1955                                          MRN:  [id number]         Date:  2021      Surgeon: Elisha Ferreira):  Robert Leon MD    Procedure: Procedure(s):  ROBOTIC ASSISTED LAPAROSCOPIC RADICAL PROSTECTOMY WITH BILATERAL LYMPH NODE DISSECTION    Medications prior to admission:   Prior to Admission medications    Medication Sig Start Date End Date Taking? Authorizing Provider   lisinopril (PRINIVIL;ZESTRIL) 30 MG tablet Take 1 tablet by mouth daily 21  Yes Ishan Barksdale,    hydrOXYzine (ATARAX) 50 MG tablet Take 1 tablet by mouth 4 times daily as needed for Itching (Rash and swelling) Caution not at work will cause drowsiness 21  Yes Yolie Martin MD   nystatin (MYCOSTATIN) 768424 UNIT/GM powder Apply 3 times daily. 10/26/21  Yes Ishan Barksdale DO   amLODIPine (NORVASC) 10 MG tablet Take 1 tablet by mouth daily 21  Yes Ishan Barksdlae DO   tamsulosin (FLOMAX) 0.4 MG capsule Take 1 capsule by mouth daily 21  Yes Robert Leon MD       Current medications:    Current Facility-Administered Medications   Medication Dose Route Frequency Provider Last Rate Last Admin    ceFAZolin (ANCEF) 3000 mg in dextrose 5 % 100 mL IVPB  3,000 mg IntraVENous 30 Min Pre-Op Robert Leon MD        0.9 % sodium chloride infusion   IntraVENous Continuous Robert Leon  mL/hr at 21 1214 New Bag at 21 1214       Allergies:  No Known Allergies    Problem List:    Patient Active Problem List   Diagnosis Code    Hypertension, essential I10    Obesity (BMI 30-39. 9) E66.9    Psoriasis L40.9    Elevated PSA, less than 10 ng/ml R97.20    Prostate cancer ([facility] [address] C61       Past Medical History:        Diagnosis Date    Cancer [facility])     prostate    Hypertension, essential     Obesity (BMI 30-39. 9)     Psoriasis        Past Surgical History:        Procedure Laterality Date    KNEE ARTHROSCOPY right    PROSTATE BIOPSY  2021    ULNAR TUNNEL RELEASE         Social History:    Social History     Tobacco Use    Smoking status: Never Smoker    Smokeless tobacco: Never Used   Substance Use Topics    Alcohol use: No                                Counseling given: Not Answered      Vital Signs (Current):   Vitals:    11/24/21 1226 12/02/21 1116   BP:  131/78   Pulse:  73   Resp:  16   Temp:  97.4 °F (36.3 °C)   TempSrc:  Temporal   SpO2:  94%   Weight: 277 lb (125.6 kg) 272 lb 3.2 oz (123.5 kg)   Height: 5' 8\" (1.727 m) 6' (1.829 m)                                              BP Readings from Last 3 Encounters:   12/02/21 131/78   11/17/21 128/64   11/09/21 (!) 142/78       NPO Status: Time of last liquid consumption: 2100                        Time of last solid consumption: 2000                        Date of last liquid consumption: 12/01/21                        Date of last solid food consumption: 11/30/21    BMI:   Wt Readings from Last 3 Encounters:   12/02/21 272 lb 3.2 oz (123.5 kg)   11/17/21 277 lb (125.6 kg)   11/09/21 286 lb 6.4 oz (129.9 kg)     Body mass index is 36.92 kg/m². CBC:   Lab Results   Component Value Date    WBC 6.4 10/20/2021    RBC 5.27 10/20/2021    HGB 15.1 10/20/2021    HCT 46.6 10/20/2021    MCV 88.4 10/20/2021    RDW 14.7 08/27/2015     10/20/2021       CMP:   Lab Results   Component Value Date     10/20/2021    K 4.5 10/20/2021     10/20/2021    CO2 22 10/20/2021    BUN 14 10/20/2021    CREATININE 0.7 10/20/2021    LABGLOM >90 10/20/2021    GLUCOSE 91 10/20/2021    PROT 7.7 07/13/2021    CALCIUM 9.4 10/20/2021    BILITOT 0.5 07/13/2021    ALKPHOS 64 07/13/2021    AST 15 07/13/2021    ALT 21 07/13/2021       POC Tests: No results for input(s): POCGLU, POCNA, POCK, POCCL, POCBUN, POCHEMO, POCHCT in the last 72 hours.     Coags:   Lab Results   Component Value Date    INR 1.01 01/24/2014       HCG (If Applicable): No results found for: PREGTESTUR, PREGSERUM, HCG, HCGQUANT     ABGs: No results found for: PHART, PO2ART, IZQ1YES, EIQ2LSU, BEART, R5VKKFMR     Type & Screen (If Applicable):  Lab Results   Component Value Date    LABRH POS 01/24/2014       Drug/Infectious Status (If Applicable):  No results found for: HIV, HEPCAB    COVID-19 Screening (If Applicable): No results found for: COVID19        Anesthesia Evaluation  Patient summary reviewed no history of anesthetic complications:   Airway: Mallampati: II  TM distance: >3 FB   Neck ROM: full  Mouth opening: > = 3 FB Dental:    (+) upper dentures and lower dentures      Pulmonary:normal exam                               Cardiovascular:    (+) hypertension:,                   Neuro/Psych:               GI/Hepatic/Renal:             Endo/Other:                     Abdominal:   (+) obese,           Vascular: Other Findings:             Anesthesia Plan      general     ASA 2       Induction: intravenous. MIPS: Postoperative opioids intended and Prophylactic antiemetics administered. Anesthetic plan and risks discussed with patient and spouse.       Plan discussed with CRNA and surgical team.                  Magdalena Lenz MD   12/2/2021

## 2021-12-02 NOTE — H&P
Cassandra Vega MD  History and Physical    Patient:  Sho Gonzales  MRN: [id number]  YOB: 1955    HISTORY OF PRESENT ILLNESS:     The patient is a 74 y.o. male who presents with prostate cancer. Here for procedure. Patient's old records, notes and chart reviewed and summarized above. Cassandra Vega MD independently reviewed the images and verified the radiology reports from:    No results found. Past Medical History:    Past Medical History:   Diagnosis Date    Cancer [facility])     prostate    Hypertension, essential     Obesity (BMI 30-39. 9)     Psoriasis        Past Surgical History:    Past Surgical History:   Procedure Laterality Date    KNEE ARTHROSCOPY      right    PROSTATE BIOPSY  2021    ULNAR TUNNEL RELEASE         Medications Prior to Admission:    Prior to Admission medications    Medication Sig Start Date End Date Taking? Authorizing Provider   lisinopril (PRINIVIL;ZESTRIL) 30 MG tablet Take 1 tablet by mouth daily 11/9/21  Yes Khloe Maki DO   amLODIPine (NORVASC) 10 MG tablet Take 1 tablet by mouth daily 9/2/21  Yes Khloe Maki DO   hydrOXYzine (ATARAX) 50 MG tablet Take 1 tablet by mouth 4 times daily as needed for Itching (Rash and swelling) Caution not at work will cause drowsiness 11/5/21   Steven Kathleen MD   nystatin (MYCOSTATIN) 993937 UNIT/GM powder Apply 3 times daily. 10/26/21   Khloe Maki DO   tamsulosin (FLOMAX) 0.4 MG capsule Take 1 capsule by mouth daily 7/26/21   Jason Cronin MD       Allergies:  Patient has no known allergies.     Social History:    Social History     Socioeconomic History    Marital status:      Spouse name: Not on file    Number of children: Not on file    Years of education: Not on file    Highest education level: Not on file   Occupational History    Not on file   Tobacco Use    Smoking status: Never Smoker    Smokeless tobacco: Never Used   Vaping Use    Vaping Use: Never used   Substance and Sexual Activity    Alcohol use: No    Drug use: No    Sexual activity: Not on file   Other Topics Concern    Not on file   Social History Narrative    Not on file     Social Determinants of Health     Financial Resource Strain:     Difficulty of Paying Living Expenses: Not on file   Food Insecurity:     Worried About Running Out of Food in the Last Year: Not on file    Jeremiah of Food in the Last Year: Not on file   Transportation Needs:     Lack of Transportation (Medical): Not on file    Lack of Transportation (Non-Medical): Not on file   Physical Activity:     Days of Exercise per Week: Not on file    Minutes of Exercise per Session: Not on file   Stress:     Feeling of Stress : Not on file   Social Connections:     Frequency of Communication with Friends and Family: Not on file    Frequency of Social Gatherings with Friends and Family: Not on file    Attends Oriental orthodox Services: Not on file    Active Member of [address] [place] [address] or Organizations: Not on file    Attends Club or Organization Meetings: Not on file    Marital Status: Not on file   Intimate Partner Violence:     Fear of Current or Ex-Partner: Not on file    Emotionally Abused: Not on file    Physically Abused: Not on file    Sexually Abused: Not on file   Housing Stability:     Unable to Pay for Housing in the Last Year: Not on file    Number of [place] in the Last Year: Not on file    Unstable Housing in the Last Year: Not on file       Family History:    Family History   Problem Relation Age of Onset    Heart Disease Father     Colon Cancer Neg Hx     Prostate Cancer Neg Hx        REVIEW OF SYSTEMS:  Constitutional: negative  Eyes: negative  Respiratory: negative  Cardiovascular: negative  Gastrointestinal: negative  Genitourinary: no acute issues  Musculoskeletal: negative  Skin: negative   Neurological: negative  Hematological/Lymphatic: negative  Psychological: negative    Physical Exam:      No data found.   Constitutional: Patient in no acute distress; Neuro: alert and oriented to person place and time. Psych: Mood and affect normal.  Skin: Normal  Lungs: Respiratory effort normal, CTA  Cardiovascular:  Normal peripheral pulses; no murmur. Normal rhythm  Abdomen: Soft, non-tender, non-distended with no CVA, flank pain, hepatosplenomegaly or hernia. Kidneys normal.  Bladder non-tender and not distended. LABS:   No results for input(s): WBC, HGB, HCT, MCV, PLT in the last 72 hours. No results for input(s): NA, K, CL, CO2, PHOS, BUN, CREATININE in the last 72 hours. Invalid input(s): CA  Lab Results   Component Value Date    PSA 9.58 (H) 08/18/2021    PSA 9.24 (H) 07/13/2021         Urinalysis: No results for input(s): COLORU, PHUR, LABCAST, WBCUA, RBCUA, MUCUS, TRICHOMONAS, YEAST, BACTERIA, CLARITYU, SPECGRAV, LEUKOCYTESUR, UROBILINOGEN, BILIRUBINUR, BLOODU in the last 72 hours. Invalid input(s): NITRATE, GLUCOSEUKETONESUAMORPHOUS     -----------------------------------------------------------------      Assessment and Plan     Impression:    Patient Active Problem List   Diagnosis    Hypertension, essential    Obesity (BMI 30-39. 9)    Psoriasis    Elevated PSA, less than 10 ng/ml    Prostate cancer [facility])       Plan:     Consent obtained; robotic prostatectomy in OR today    Shun Galeana MD  7:40 AM 12/2/2021

## 2021-12-03 ENCOUNTER — TELEPHONE (OUTPATIENT)
Dept: UROLOGY | Age: 66
End: 2021-12-03

## 2021-12-03 LAB
ANION GAP SERPL CALCULATED.3IONS-SCNC: 8 MEQ/L (ref 8–16)
BASOPHILS # BLD: 0.4 %
BASOPHILS ABSOLUTE: 0 THOU/MM3 (ref 0–0.1)
BUN BLDV-MCNC: 18 MG/DL (ref 7–22)
CALCIUM SERPL-MCNC: 8.7 MG/DL (ref 8.5–10.5)
CHLORIDE BLD-SCNC: 106 MEQ/L (ref 98–111)
CO2: 25 MEQ/L (ref 23–33)
CREAT SERPL-MCNC: 0.9 MG/DL (ref 0.4–1.2)
EOSINOPHIL # BLD: 1.4 %
EOSINOPHILS ABSOLUTE: 0.1 THOU/MM3 (ref 0–0.4)
ERYTHROCYTE [DISTWIDTH] IN BLOOD BY AUTOMATED COUNT: 14.1 % (ref 11.5–14.5)
ERYTHROCYTE [DISTWIDTH] IN BLOOD BY AUTOMATED COUNT: 45.4 FL (ref 35–45)
GFR SERPL CREATININE-BSD FRML MDRD: 84 ML/MIN/1.73M2
GLUCOSE BLD-MCNC: 97 MG/DL (ref 70–108)
HCT VFR BLD CALC: 40.8 % (ref 42–52)
HEMOGLOBIN: 13.3 GM/DL (ref 14–18)
IMMATURE GRANS (ABS): 0.01 THOU/MM3 (ref 0–0.07)
IMMATURE GRANULOCYTES: 0.1 %
LYMPHOCYTES # BLD: 16.4 %
LYMPHOCYTES ABSOLUTE: 1.1 THOU/MM3 (ref 1–4.8)
MCH RBC QN AUTO: 29 PG (ref 26–33)
MCHC RBC AUTO-ENTMCNC: 32.6 GM/DL (ref 32.2–35.5)
MCV RBC AUTO: 88.9 FL (ref 80–94)
MONOCYTES # BLD: 11.2 %
MONOCYTES ABSOLUTE: 0.8 THOU/MM3 (ref 0.4–1.3)
NUCLEATED RED BLOOD CELLS: 0 /100 WBC
PLATELET # BLD: 238 THOU/MM3 (ref 130–400)
PMV BLD AUTO: 9.2 FL (ref 9.4–12.4)
POTASSIUM SERPL-SCNC: 4.6 MEQ/L (ref 3.5–5.2)
RBC # BLD: 4.59 MILL/MM3 (ref 4.7–6.1)
SEG NEUTROPHILS: 70.5 %
SEGMENTED NEUTROPHILS ABSOLUTE COUNT: 4.9 THOU/MM3 (ref 1.8–7.7)
SODIUM BLD-SCNC: 139 MEQ/L (ref 135–145)
WBC # BLD: 7 THOU/MM3 (ref 4.8–10.8)

## 2021-12-03 PROCEDURE — 6370000000 HC RX 637 (ALT 250 FOR IP): Performed by: PHYSICIAN ASSISTANT

## 2021-12-03 PROCEDURE — 80048 BASIC METABOLIC PNL TOTAL CA: CPT

## 2021-12-03 PROCEDURE — 2580000003 HC RX 258: Performed by: PHYSICIAN ASSISTANT

## 2021-12-03 PROCEDURE — APPNB30 APP NON BILLABLE TIME 0-30 MINS: Performed by: UROLOGY

## 2021-12-03 PROCEDURE — 6360000002 HC RX W HCPCS: Performed by: PHYSICIAN ASSISTANT

## 2021-12-03 PROCEDURE — 36415 COLL VENOUS BLD VENIPUNCTURE: CPT

## 2021-12-03 PROCEDURE — 85025 COMPLETE CBC W/AUTO DIFF WBC: CPT

## 2021-12-03 RX ORDER — HYDROCODONE BITARTRATE AND ACETAMINOPHEN 5; 325 MG/1; MG/1
1 TABLET ORAL EVERY 4 HOURS PRN
Qty: 28 TABLET | Refills: 0 | Status: SHIPPED | OUTPATIENT
Start: 2021-12-03 | End: 2021-12-10

## 2021-12-03 RX ORDER — CIPROFLOXACIN 500 MG/1
500 TABLET, FILM COATED ORAL 2 TIMES DAILY
Qty: 6 TABLET | Refills: 0 | Status: SHIPPED | OUTPATIENT
Start: 2021-12-03 | End: 2021-12-06

## 2021-12-03 RX ORDER — SENNA AND DOCUSATE SODIUM 50; 8.6 MG/1; MG/1
1 TABLET, FILM COATED ORAL 2 TIMES DAILY
Qty: 30 TABLET | Refills: 0 | Status: SHIPPED | OUTPATIENT
Start: 2021-12-03 | End: 2021-12-14

## 2021-12-03 RX ORDER — ONDANSETRON 4 MG/1
4 TABLET, ORALLY DISINTEGRATING ORAL EVERY 8 HOURS PRN
Qty: 30 TABLET | Refills: 0 | Status: SHIPPED | OUTPATIENT
Start: 2021-12-03 | End: 2021-12-14

## 2021-12-03 RX ADMIN — TROSPIUM CHLORIDE 20 MG: 20 TABLET, FILM COATED ORAL at 06:17

## 2021-12-03 RX ADMIN — BACITRACIN ZINC NEOMYCIN SULFATE POLYMYXIN B SULFATE: 400; 3.5; 5 OINTMENT TOPICAL at 10:09

## 2021-12-03 RX ADMIN — SODIUM CHLORIDE, PRESERVATIVE FREE 10 ML: 5 INJECTION INTRAVENOUS at 19:53

## 2021-12-03 RX ADMIN — TROSPIUM CHLORIDE 20 MG: 20 TABLET, FILM COATED ORAL at 15:57

## 2021-12-03 RX ADMIN — MORPHINE SULFATE 4 MG: 4 INJECTION, SOLUTION INTRAMUSCULAR; INTRAVENOUS at 06:17

## 2021-12-03 RX ADMIN — MICONAZOLE NITRATE: 20 POWDER TOPICAL at 10:08

## 2021-12-03 RX ADMIN — BACITRACIN ZINC NEOMYCIN SULFATE POLYMYXIN B SULFATE: 400; 3.5; 5 OINTMENT TOPICAL at 19:53

## 2021-12-03 RX ADMIN — HYDROCODONE BITARTRATE AND ACETAMINOPHEN 2 TABLET: 5; 325 TABLET ORAL at 10:08

## 2021-12-03 RX ADMIN — DOCUSATE SODIUM 50 MG AND SENNOSIDES 8.6 MG 1 TABLET: 8.6; 5 TABLET, FILM COATED ORAL at 19:53

## 2021-12-03 RX ADMIN — HYDROCODONE BITARTRATE AND ACETAMINOPHEN 2 TABLET: 5; 325 TABLET ORAL at 17:35

## 2021-12-03 RX ADMIN — HYDROCODONE BITARTRATE AND ACETAMINOPHEN 1 TABLET: 5; 325 TABLET ORAL at 23:48

## 2021-12-03 RX ADMIN — DOCUSATE SODIUM 50 MG AND SENNOSIDES 8.6 MG 1 TABLET: 8.6; 5 TABLET, FILM COATED ORAL at 10:07

## 2021-12-03 RX ADMIN — MICONAZOLE NITRATE: 20 POWDER TOPICAL at 19:53

## 2021-12-03 RX ADMIN — HYDROCODONE BITARTRATE AND ACETAMINOPHEN 1 TABLET: 5; 325 TABLET ORAL at 13:35

## 2021-12-03 ASSESSMENT — PAIN SCALES - GENERAL
PAINLEVEL_OUTOF10: 5
PAINLEVEL_OUTOF10: 3
PAINLEVEL_OUTOF10: 8
PAINLEVEL_OUTOF10: 7
PAINLEVEL_OUTOF10: 7
PAINLEVEL_OUTOF10: 5
PAINLEVEL_OUTOF10: 4
PAINLEVEL_OUTOF10: 4
PAINLEVEL_OUTOF10: 5
PAINLEVEL_OUTOF10: 6
PAINLEVEL_OUTOF10: 6

## 2021-12-03 ASSESSMENT — PAIN - FUNCTIONAL ASSESSMENT
PAIN_FUNCTIONAL_ASSESSMENT: ACTIVITIES ARE NOT PREVENTED

## 2021-12-03 ASSESSMENT — PAIN DESCRIPTION - PAIN TYPE
TYPE: ACUTE PAIN;SURGICAL PAIN
TYPE: SURGICAL PAIN
TYPE: SURGICAL PAIN

## 2021-12-03 ASSESSMENT — PAIN DESCRIPTION - ORIENTATION
ORIENTATION: RIGHT;LEFT
ORIENTATION: OUTER
ORIENTATION: LEFT;RIGHT

## 2021-12-03 ASSESSMENT — PAIN DESCRIPTION - PROGRESSION
CLINICAL_PROGRESSION: NOT CHANGED

## 2021-12-03 ASSESSMENT — PAIN DESCRIPTION - FREQUENCY
FREQUENCY: INTERMITTENT
FREQUENCY: INTERMITTENT
FREQUENCY: CONTINUOUS

## 2021-12-03 ASSESSMENT — PAIN DESCRIPTION - ONSET
ONSET: ON-GOING

## 2021-12-03 ASSESSMENT — PAIN DESCRIPTION - DESCRIPTORS
DESCRIPTORS: ACHING

## 2021-12-03 ASSESSMENT — PAIN DESCRIPTION - LOCATION
LOCATION: ABDOMEN

## 2021-12-03 NOTE — DISCHARGE SUMMARY
Patient Identification  Lindsey Beal is a 74 y.o. male. :  1955  Admit Date:  2021    Discharge date: 21                                    Disposition: home    Discharge Diagnoses:   Patient Active Problem List   Diagnosis    Hypertension, essential    Obesity (BMI 30-39. 9)    Psoriasis    Elevated PSA, less than 10 ng/ml    Prostate cancer ([facility] [address]       Consults: none    Surgery: ROBOTIC ASSISTED LAPAROSCOPIC RADICAL PROSTECTOMY WITH BILATERAL LYMPH NODE DISSECTION on 2021    Patient Instructions: Activity: no heavy lifting, pushing, pulling for 6 weeks, no driving while on analgesics. Diet: As tolerated  Follow-up with cystogram in 1 wk, Dr Emeli Fernandez after    Hospital course: Patient underwent ROBOTIC ASSISTED LAPAROSCOPIC RADICAL PROSTECTOMY WITH BILATERAL LYMPH NODE DISSECTION on 10/1/2040 with no complications. Post-operatively he remained stable.  Patient is tolerating diet, palmer draining yellow urine, pain is minimal, ambulating well with assistance, belching    Time spent for discharge 20-25

## 2021-12-03 NOTE — CARE COORDINATION
12/3/21, 3:55 PM EST  DISCHARGE PLANNING EVALUATION:    Kulwinder Patterson       Admitted: 12/2/2021/ [facility] day: 0   Location: -[facility]-A Reason for admit: Prostate cancer ([facility] [address] Joshua Truong   PMH:  has a past medical history of Cancer ([facility] [address] Hypertension, essential, Obesity (BMI 30-39.9), and Psoriasis. Procedure: 12/2/21 surgery by Dr Nestor Umana: Lisseth Nett WITH BILATERAL LYMPH NODE DISSECTION  Barriers to Discharge:  POD 1. Pain control. I&O  PCP: Liseth Hinkle DO   %  Patient Goals/Plan/Treatment Preferences: Rose Mary Knott said he is going home with his girlfriend. No discharge needs voiced. Transportation/Food Security/Housekeeping Addressed:  No issues identified. 12/3/21, 4:22 PM EST    Patient goals/plan/ treatment preferences discussed by  and . Patient goals/plan/ treatment preferences reviewed with patient/ family. Patient/ family verbalize understanding of discharge plan and are in agreement with goal/plan/treatment preferences. Understanding was demonstrated using the teach back method. AVS provided by RN at time of discharge, which includes all necessary medical information pertaining to the patients current course of illness, treatment, post-discharge goals of care, and treatment preferences.

## 2021-12-03 NOTE — PROGRESS NOTES
Dustin Sanchez, APRN - CNP  Urology Progress Note    Subjective: Carolyn Gonzalez is a 74 y.o. male. s/p ROBOTIC ASSISTED LAPAROSCOPIC RADICAL PROSTECTOMY WITH BILATERAL LYMPH NODE DISSECTION on 12/2/2021    His/Her current Diet is: ADULT DIET; Regular. Since the previous note, the patient reports the following:  No acute issues overnight. No fevers or chills. No nausea or vomiting. No chest pain or shortness of breath. No calf pain. Pain Controlled. Ambulating. Tolerating PO Diet. Not passing gas yet    Encourage ambulation  IC 10Xhr  Advance diet  Pain control with oral pain medication  Will be discharged with palmer catheter, cystogram in 7 days,  Our office will coordinate    Vitals and Labs:  Patient Vitals for the past 24 hrs:   BP Temp Temp src Pulse Resp SpO2 Height Weight   12/03/21 0347 110/74 97.4 °F (36.3 °C) Oral 72 16 97 %     12/02/21 2310 (!) 103/57 97.8 °F (36.6 °C) Oral 76 16 95 %     12/02/21 2003    84 16      12/02/21 1932 91/65 97.6 °F (36.4 °C) Oral 78 14 97 %     12/02/21 1845 111/65 97.7 °F (36.5 °C) Oral 73 12 97 %     12/02/21 1835    84 14 95 %     12/02/21 1830 (!) 104/59   79 15 95 %     12/02/21 1825 (!) 113/56   74 10 95 %     12/02/21 1820 (!) 112/54   75 10 95 %     12/02/21 1815 (!) 112/56   76 11 96 %     12/02/21 1810 (!) 110/55   74 10 96 %     12/02/21 1805 (!) 114/56   75 10 95 %     12/02/21 1800 (!) 105/57   74 10 94 %     12/02/21 1755 (!) 106/58   75 10 95 %     12/02/21 1750 (!) 108/59   79 13 95 %     12/02/21 1745 (!) 104/50   76 12 97 %     12/02/21 1740 (!) 99/54   79 14 96 %     12/02/21 1735 (!) 100/55   86 11 96 %     12/02/21 1732 103/63 97.3 °F (36.3 °C) Temporal 85 12 96 %     12/02/21 1116 131/78 97.4 °F (36.3 °C) Temporal 73 16 94 % 6' (1.829 m) 272 lb 3.2 oz (123.5 kg)     I/O last 3 completed shifts: In: 1300 [P.O.:300;  I.V.:1000]  Out: 850 [Urine:750; Blood:100]    Recent Labs     12/02/21 2005 12/03/21  0724   WBC 10.8 7.0   HGB 14.0 13.3*   HCT 42.6 40.8*   MCV 89.7 88.9    238     Recent Labs     12/02/21 2005 12/03/21  0724    139   K 5.0 4.6    106   CO2 21* 25   BUN 21 18   CREATININE 1.2 0.9       No results for input(s): COLORU, PHUR, LABCAST, WBCUA, RBCUA, MUCUS, TRICHOMONAS, YEAST, BACTERIA, CLARITYU, SPECGRAV, LEUKOCYTESUR, UROBILINOGEN, Bibi Ingles in the last 72 hours. Invalid input(s): NITRATE, GLUCOSEUKETONESUAMORPHOUS    Physical Exam:  No acute distress. Awake, alert and oriented. Neck is supple  Regular rate and rhythm. Normal peripheral pulses  No accessory muscles of inspiration. Symmetric chest rise  Abdomen soft, expected tenderness, some distention. No CVA tenderness. Incisional sites clean, dry. Some dried drainage at midline incision  No calf pain. Minimal/no edema in bilateral lower extremities. Skin is warm, dry  Psych: mood, affect normal    Additional Lab/Culture results:     Imaging Reviewed:     JENNY Salinas CNP independently reviewed the images and verified the radiology reports from:    No results found. Impression:    Patient Active Problem List   Diagnosis    Hypertension, essential    Obesity (BMI 30-39. 9)    Psoriasis    Elevated PSA, less than 10 ng/ml    Prostate cancer ([facility] [address]       s/p ROBOTIC ASSISTED LAPAROSCOPIC RADICAL PROSTECTOMY WITH BILATERAL LYMPH NODE DISSECTION on 12/2/2021    Plan:  Not passing gas yet  Encourage ambulation  IC 10Xhr  Advance diet  Pain control with oral pain medication  Will be discharged with palmer catheter, cystogram in 7 days,  Our office will coordinate    Will evaluate this afternoon for possible discharge    JENNY Salinas CNP  9:01 AM 12/3/2021

## 2021-12-04 VITALS
HEART RATE: 76 BPM | OXYGEN SATURATION: 92 % | BODY MASS INDEX: 36.87 KG/M2 | WEIGHT: 272.2 LBS | SYSTOLIC BLOOD PRESSURE: 115 MMHG | TEMPERATURE: 98.3 F | HEIGHT: 72 IN | DIASTOLIC BLOOD PRESSURE: 66 MMHG | RESPIRATION RATE: 18 BRPM

## 2021-12-04 PROCEDURE — 6370000000 HC RX 637 (ALT 250 FOR IP): Performed by: PHYSICIAN ASSISTANT

## 2021-12-04 PROCEDURE — 99024 POSTOP FOLLOW-UP VISIT: CPT | Performed by: NURSE PRACTITIONER

## 2021-12-04 PROCEDURE — 6370000000 HC RX 637 (ALT 250 FOR IP): Performed by: NURSE PRACTITIONER

## 2021-12-04 PROCEDURE — APPNB30 APP NON BILLABLE TIME 0-30 MINS: Performed by: NURSE PRACTITIONER

## 2021-12-04 PROCEDURE — 2580000003 HC RX 258: Performed by: PHYSICIAN ASSISTANT

## 2021-12-04 RX ORDER — POLYETHYLENE GLYCOL 3350 17 G/17G
17 POWDER, FOR SOLUTION ORAL DAILY
Qty: 1530 G | Refills: 1 | Status: SHIPPED | OUTPATIENT
Start: 2021-12-04 | End: 2021-12-14

## 2021-12-04 RX ORDER — POLYETHYLENE GLYCOL 3350 17 G/17G
17 POWDER, FOR SOLUTION ORAL DAILY
Status: DISCONTINUED | OUTPATIENT
Start: 2021-12-04 | End: 2021-12-04 | Stop reason: HOSPADM

## 2021-12-04 RX ORDER — CEPHALEXIN 500 MG/1
500 CAPSULE ORAL 3 TIMES DAILY
Qty: 15 CAPSULE | Refills: 0 | Status: SHIPPED | OUTPATIENT
Start: 2021-12-04 | End: 2021-12-09

## 2021-12-04 RX ADMIN — HYDROCODONE BITARTRATE AND ACETAMINOPHEN 1 TABLET: 5; 325 TABLET ORAL at 04:09

## 2021-12-04 RX ADMIN — TROSPIUM CHLORIDE 20 MG: 20 TABLET, FILM COATED ORAL at 06:06

## 2021-12-04 RX ADMIN — POLYETHYLENE GLYCOL 3350 17 G: 17 POWDER, FOR SOLUTION ORAL at 11:25

## 2021-12-04 RX ADMIN — HYDROCODONE BITARTRATE AND ACETAMINOPHEN 1 TABLET: 5; 325 TABLET ORAL at 09:23

## 2021-12-04 RX ADMIN — BACITRACIN ZINC NEOMYCIN SULFATE POLYMYXIN B SULFATE: 400; 3.5; 5 OINTMENT TOPICAL at 07:49

## 2021-12-04 RX ADMIN — LISINOPRIL 30 MG: 20 TABLET ORAL at 07:50

## 2021-12-04 RX ADMIN — SODIUM CHLORIDE, PRESERVATIVE FREE 10 ML: 5 INJECTION INTRAVENOUS at 07:50

## 2021-12-04 RX ADMIN — AMLODIPINE BESYLATE 10 MG: 10 TABLET ORAL at 07:50

## 2021-12-04 RX ADMIN — DOCUSATE SODIUM 50 MG AND SENNOSIDES 8.6 MG 1 TABLET: 8.6; 5 TABLET, FILM COATED ORAL at 07:49

## 2021-12-04 RX ADMIN — MICONAZOLE NITRATE: 20 POWDER TOPICAL at 07:49

## 2021-12-04 ASSESSMENT — PAIN SCALES - GENERAL
PAINLEVEL_OUTOF10: 5

## 2021-12-04 NOTE — PROGRESS NOTES
Angi Romeo, APRN - CNP  Urology Progress Note    Subjective: Hany Martin is a 74 y.o. male. s/p ROBOTIC ASSISTED LAPAROSCOPIC RADICAL PROSTECTOMY WITH BILATERAL LYMPH NODE DISSECTION on 12/2/2021    His/Her current Diet is: ADULT DIET; Regular. Since the previous note, the patient reports the following:  No acute issues overnight. No fevers or chills. No nausea or vomiting. No chest pain or shortness of breath. No calf pain. Pain Controlled. Ambulating. Tolerating PO Diet. Vitals and Labs:  Patient Vitals for the past 24 hrs:   BP Temp Temp src Pulse Resp SpO2   12/04/21 0730 120/73 98.3 °F (36.8 °C) Oral 80 18 92 %   12/04/21 0400 109/70 98.9 °F (37.2 °C) Oral 79 16 92 %   12/03/21 1948 116/72 99 °F (37.2 °C) Oral 97 18 92 %   12/03/21 1537 109/60 99 °F (37.2 °C) Oral 78 16 92 %   12/03/21 1210 100/68 99.2 °F (37.3 °C) Oral 86 18 96 %   12/03/21 0950 100/75 99.2 °F (37.3 °C) Oral 79 16 93 %     I/O last 3 completed shifts: In: 940 [P.O.:940]  Out: 1975 [[id number]]    Recent Labs     12/02/21 2005 12/03/21  0724   WBC 10.8 7.0   HGB 14.0 13.3*   HCT 42.6 40.8*   MCV 89.7 88.9    238     Recent Labs     12/02/21 2005 12/03/21  0724    139   K 5.0 4.6    106   CO2 21* 25   BUN 21 18   CREATININE 1.2 0.9       No results for input(s): COLORU, PHUR, LABCAST, WBCUA, RBCUA, MUCUS, TRICHOMONAS, YEAST, BACTERIA, CLARITYU, SPECGRAV, LEUKOCYTESUR, UROBILINOGEN, Scott Booty in the last 72 hours. Invalid input(s): NITRATE, GLUCOSEUKETONESUAMORPHOUS    Physical Exam:  No acute distress. Awake, alert and oriented. Neck is supple  Regular rate and rhythm. Normal peripheral pulses  No accessory muscles of inspiration. Symmetric chest rise  Abdomen soft, non-tender, non-distended. BS hypoactive. No CVA tenderness. Incisions approximated. Umbilical incision with erythema, dressing removed with old blood on it. No ecchymosis. No calf pain.  Minimal/no edema in bilateral lower extremities. Skin is warm, dry  Psych: mood, affect normal    Additional Lab/Culture results:     Imaging Reviewed:     JENNY Clay CNP independently reviewed the images and verified the radiology reports from:    No results found. Impression:    Patient Active Problem List   Diagnosis    Hypertension, essential    Obesity (BMI 30-39. 9)    Psoriasis    Elevated PSA, less than 10 ng/ml    Prostate cancer ([address]       s/p ROBOTIC ASSISTED LAPAROSCOPIC RADICAL PROSTECTOMY WITH BILATERAL LYMPH NODE DISSECTION on 12/2/2021    Plan:  Plan home today  Pain better controlled with Norco around the clock  FU 1 week, cystogram and office visit. Keflex sent in as well. Umbilical incision with erythema, small amount of drainage.       JENNY Clay CNP  8:52 AM 12/4/2021

## 2021-12-06 ENCOUNTER — TELEPHONE (OUTPATIENT)
Dept: UROLOGY | Age: 66
End: 2021-12-06

## 2021-12-06 DIAGNOSIS — C61 PROSTATE CANCER (HCC): Primary | ICD-10-CM

## 2021-12-06 RX ORDER — DOCUSATE SODIUM 100 MG/1
100 CAPSULE, LIQUID FILLED ORAL 2 TIMES DAILY
Qty: 60 CAPSULE | Refills: 0 | Status: SHIPPED | OUTPATIENT
Start: 2021-12-06 | End: 2021-12-14

## 2021-12-06 NOTE — TELEPHONE ENCOUNTER
Patient underwent ROBOTIC ASSISTED LAPAROSCOPIC RADICAL PROSTECTOMY WITH BILATERAL LYMPH NODE DISSECTION on 12/2/2021with Dr Nikolas Cabrera and scheduled for cystogram on 12/09/2021. He has not had a bowel movement since Wednesday 12/01/2021. He is taking senokot 1 BID. He was not taking the miralax or bisacodyl tabs that was prescribed but will start them. He is passing flatus and denies nausea. He will call the office back if he continues to have problems with constipation.

## 2021-12-06 NOTE — TELEPHONE ENCOUNTER
Perla Cabrera on [employer]IP[employer] advised to have the patient ambulate, take 2 capful of miralax daily and colace was sent to the pharmacy.

## 2021-12-07 NOTE — OP NOTE
Patient:  Kulwinder Patterson  MRN: [id number]  YOB: 1955    FACILITY: [facility]    DATE:12/2/2021    SURGEON: Goldie Zamora MD     ASSISTANT:Lucila García PA-C    PREOPERATIVE DIAGNOSIS: PROSTATE CANCER      POSTOPERATIVE DIAGNOSIS: prostate cancer    PROCEDURE PERFORMED:     1. Robotic Assisted Laparoscopic Radical Prostatectomy   2. Bilateral pelvic lymph node dissection. 3.    clarix nerve wrap for nerve regeneration    ANESTHESIA: General    ESTIMATED BLOOD LOSS: 100 (units unknown)     COMPLICATIONS: None immediate    DRAINS:   1. Urethral Abraham Catheter         SPECIMENS:   ID Type Source Tests Collected by Time Destination   A : PROSTATE, SEMINAL VESICLES, BILATERAL PELVIC LYMPH NODES Tissue Prostate SURGICAL PATHOLOGY Pepe Michele MD 12/2/2021 1043    B : BILAT PELVIC LYMPH NODES Tissue Prostate SURGICAL PATHOLOGY Pepe Michele MD 12/2/2021 1044        INDICATIONS FOR PROCEDURE:  The patient is a 74 y.o. male who presents today with PROSTATE CANCER here for ROBOTIC ASSISTED LAPAROSCOPIC RADICAL PROSTECTOMY WITH BILATERAL LYMPH NODE DISSECTION. After risks, benefits and alternatives of the procedure were discussed with the patient, the patient elected to proceed. DESCRIPTION OF PROCEDURE:  This patient was given preoperative anticoagulation and antibiotics and was brought back to the operating room and positioned in the supine position. General anesthesia was started. He was secured to the bed and then he was sterilely prepped and draped in standard fashion. An 25 Sinhala Abraham catheter was placed and a supraumbilical skin incision was made, a Veress needle was placed through this into the peritoneum. Pneumoperitoneum was obtained. An camera port was placed through this incision into the peritoneum. The peritoneum was examined. No injuries were noted. The rest of the ports were placed in the usual fashion.  Three robotic 8 mm ports were placed, one 12 mm, and one 5 mm ports under direct vision. After this the robot was then docked and the procedure was started by taking down sigmoid adhesions and then reflecting the bladder in the usual fashion by incising lateral to the medial umbilical ligaments and transecting the urachus. The fat off the anterior aspect of the prostate was excised. Endopelvic fascia on each side was incised. The dorsal vein was ligated with a figure-of-8 stitch of 0-V-Loc. This was pexed to the pubic bone. I then transected the bladder making sure not to enter the prostate and making sure not to enter the ureteral orifices. The anterior Denonvilliers fascia was identified, it was incised. The seminal vesicles and vasa were dissected and lifted anteriorly. The posterior Denonvilliers fascia was incised and the rectum was swept off the posterior lateral aspect of the prostate. The prostatic pedicles were ligated and divided with Hem-o-Dulce clips and we performed standard nerve dissection. This progressed on both sides towards the apex of the prostate, then the dorsal venous complex was incised, the urethra was dissected and then transected maintaining urethral length. The rectourethralis was transected, the prostate was free and the pelvis was irrigated, no injuries were noted. There was adequate hemostasis. The right pelvic lymph node dissection was performed by sampling lymph nodes between the iliac vein and the obturator nerve, making sure not to injure the structures, the same was on the left side by sampling the nodes between the iliac vein and the obturator nerve, making sure not to injure the structures. After this the lymph nodes and the prostate were placed into an EndoCatch bag. clarix nerve wrap was placed on neurovascular bundle for nerve regenration. then the Kalen stitch was performed with a 3-0 Monocryl and a figure-of-8 stitch manner, bringing together posterior Denonvilliers fascia and the rectourethralis was tied down.  The bladder urethra anastomosis was performed with a 2-0 quill stitch in a running fashion. This was tied down and new Abraham catheter was placed. The bladder was irrigated, there was no extravasation noted. We decided to not leave a drain. Then the robot was undocked, the umbilical skin incision was elongated, the specimen bag was removed through this incision, this incision was closed with figure-of-8 stitches of 0-Vicryl. All the skin incisions were closed with 4-0 Monocryl. Dermabond was placed and that was the end of the procedure. The patient will be admitted for routine postoperative care. I was present and scrubbed throughout the entire case.

## 2021-12-09 ENCOUNTER — HOSPITAL ENCOUNTER (OUTPATIENT)
Dept: PHYSICAL THERAPY | Age: 66
Setting detail: THERAPIES SERIES
End: 2021-12-09
Payer: MEDICARE

## 2021-12-09 ENCOUNTER — HOSPITAL ENCOUNTER (OUTPATIENT)
Dept: GENERAL RADIOLOGY | Age: 66
Discharge: HOME | End: 2021-12-09
Payer: MEDICARE

## 2021-12-09 DIAGNOSIS — C61 PROSTATE CANCER (HCC): ICD-10-CM

## 2021-12-09 PROCEDURE — 6360000004 HC RX CONTRAST MEDICATION: Performed by: UROLOGY

## 2021-12-09 PROCEDURE — 51600 INJECTION FOR BLADDER X-RAY: CPT

## 2021-12-09 PROCEDURE — 74430 CONTRAST X-RAY BLADDER: CPT

## 2021-12-09 RX ADMIN — IOTHALAMATE MEGLUMINE 250 ML: 172 INJECTION URETERAL at 10:56

## 2021-12-14 ENCOUNTER — OFFICE VISIT (OUTPATIENT)
Dept: UROLOGY | Age: 66
End: 2021-12-14

## 2021-12-14 VITALS — WEIGHT: 272 LBS | RESPIRATION RATE: 16 BRPM | HEIGHT: 72 IN | BODY MASS INDEX: 36.84 KG/M2

## 2021-12-14 DIAGNOSIS — C61 PROSTATE CANCER (HCC): Primary | ICD-10-CM

## 2021-12-14 PROCEDURE — 99024 POSTOP FOLLOW-UP VISIT: CPT | Performed by: UROLOGY

## 2021-12-14 NOTE — PROGRESS NOTES
S/p prostatectomy    FINAL DIAGNOSIS:   Prostate and bilateral pelvic lymph nodes, radical prostatectomy and   bilateral pelvic lymphadenectomy:             Prostatic adenocarcinoma, acinar type, Kelly score 3+4 = 7   (grade group 2 of 5).    Carcinoma involves approximately 10% of the submitted tissue and is   confined to       the prostate.       Negative for extraprostatic extension, bladder neck invasion,   seminal vesicle       invasion, and margin involvement.       Six lymph nodes with no evidence of malignancy.  (0/6)       Nodular prostatic hyperplasia with mixed inflammation.       pT2, pN0     No plans for radiation  Doing well with incontinence- reassurance provided  Incisions look good  F/u in six weeks with psa with alejandro  Follow up three months with Albina Clement

## 2021-12-22 ENCOUNTER — HOSPITAL ENCOUNTER (OUTPATIENT)
Dept: PHYSICAL THERAPY | Age: 66
Setting detail: THERAPIES SERIES
Discharge: HOME | End: 2021-12-22
Payer: MEDICARE

## 2021-12-22 PROCEDURE — 97530 THERAPEUTIC ACTIVITIES: CPT | Performed by: PHYSICAL THERAPIST

## 2021-12-22 PROCEDURE — 97164 PT RE-EVAL EST PLAN CARE: CPT | Performed by: PHYSICAL THERAPIST

## 2021-12-22 NOTE — PROGRESS NOTES
** PLEASE SIGN, DATE AND TIME CERTIFICATION BELOW AND RETURN TO [facility] OUTPATIENT REHABILITATION (FAX #: [phone]). ATTEST/CO-SIGN IF ACCESSING VIA [employer]. THANK YOU.**    I certify that I have examined the patient below and determined that Physical Medicine and Rehabilitation service is necessary and that I approve the established plan of care for up to 90 days or as specifically noted. Attestation, signature or co-signature of physician indicates approval of certification requirements.    ________________________ ____________ __________  Physician Signature   Date   Time  [facility]  PHYSICAL THERAPY  OUTPATIENT REHABILITATION - SPECIALIZED THERAPY SERVICES  [x] [address] [address]  [] DAILY NOTE  [] PROGRESS NOTE [] DISCHARGE NOTE    Date: 2021  Patient Name:  Daya Randle  : 1955  MRN: [id number]  CSN: [id number]    Referring Practitioner Raudel Whitney MD   Diagnosis Malignant neoplasm of prostate [C61]    Treatment Diagnosis M62.58 - Muscle Wasting and Atrophy, nec, other site  M62.89 - Other Specified Disorders of Muscle and R35.1 - Nocturia  R39.14 - Feeling of Incomplete Bladder Emptying   N39.3 male HARPREET R 35.0 frequent urination   Date of Evaluation 21    Additional Pertinent History HTN, obesity      Functional Outcome Measure Used IIQ and RON   Functional Outcome Score 2 and 3 (21)  14 and 6 (2021)      Insurance: Primary: Payor: Jena Eaton /  /  / ,   SecondaryEla Ilfeld   Authorization Information: No pre-cert req'd   Visit # 2, 2/10 for progress note   Visits Allowed: Unlimited based on med necessity   Recertification Date:    Physician Follow-Up:    Physician Orders: eval and treat   History of Present Illness: RALP 2021; pre op had weak urine stream, incomplete emptying, urinary frequency, known enlarged prostate     SUBJECTIVE: Pt returns to PT s/p RALP performed 2021 as scheduled.   Pt states surgery itself went well, 2 nights hospital stay due to a temporary fever, lymph nodes were clear. Catheter removed 1 week post op with mod to signif urinary incont thus far. Pt is kris frustrated with leaking upon standing up and changing position. Social/Functional History and Current Status:  Medications and Allergies have been reviewed and are listed on Medical History Questionnaire. Pt did is not on Flomax post op. Viv Cintron lives with significant other in a single story home with a level surface to enter. Task Previous Current   ADLs  Independent Independent   IADL's Independent signif post op urinary incont and lack of bladder control   Ambulation Independent Has dribbling with walking   Transfers Independent Leaks with basic transfers   Recreation Independent likes to be active with house work and yard work   Community Integration Independent Independent   Driving Active  Active    Work [employer]. 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        PAIN -- pt denies consistent pain   Pt denies post op pain or hematuria    BLADDER ASSESSMENT [] Deferred secondary to:   Daily Fluid Ingestion: 12 c water, some flavoring, occas Pepsi, occas milk   Urination Frequency Times/Day: every 30 minutes  Times/Night: 2   Volume Small with sudden stop   Urge Sensation Normal    SYMPTOM QUESTIONNAIRE   Loses Urine Upon: Standing up, changing position, sneeze, cough, walk, bending   Incontinence Volume: occas sm but mostly med   Frequency of Leakage: frequent   Wets the Bed: Damp for sleep   Burning/Pain with Urination: no   Difficulty Starting a Stream of Urine: no   Incomplete Emptying Pt unsure due sudden stream stopping   Strain to Empty Bladder: no   Falling Out Feeling: n/a   Urinate more than 7 times/day: yes   Use a form of Leakage Protection: 5 Depends per 24 hrs   Restrict Fluid Intake: no   Stream Strength Average to strong       BOWEL ASSESSMENT [] Deferred secondary to:   Frequency: Bid to every other day   Most Common Stool Consistency: soft   SYMPTOM QUESTIONNAIRE   Strain to have a BM: no   Include fiber in your diet: yes: fruits   Take laxatives/enemas regularly: no   Pain with BM: no   Strong urge to have BM: no   Leak/Stain Feces: no   Diarrhea often: no         SEXUAL ASSESSMENT [] Deferred secondary to:   Sexually Active Has had ED for about 1 year     VITALS [] Deferred secondary to:   Height 6'0\"   Weight 270#       GENERAL ASSESSMENT   [] Deferred secondary to:   Palpation    Observation Abdominal incisions healing well   Posture Forward Head Posture and abdominal obesity   Range of Motion Lumbar and B LE AROM WFL and without c/o   Strength B LE strength WNL and without c/o; abdominal strength NT due to recent surgery   Gait WFL   Sensation WFL   Edema mild pitting edema B LE's   Balance/Fall History Denies balance or fall problems   Special Tests Neg SLR, ave hams flexi; Neg Homans           OBSERVATION  [] Deferred secondary to:   Patient Safety Patient offered a chaperone and declined.    Skin Condition intact   Urogenital Triangle No tenderness or tightness reported       PELVIC FLOOR EXTERNAL EXAM [] Deferred secondary to:   Exam perineal body   Sensation Intact   Muscle Localization Good minus but pt must concentrate   Palpation/Tone Normal   Pelvic Floor Strength PERF:Power: 1+to 2-,Endurance: 4,Reps: 10,Flicks: 10   Relax after Contraction Normal       TREATMENT   Precautions:     X in shaded column indicates activity completed today   Modalities Parameters/  Location  Notes                     Manual Therapy Time/Technique  Notes                     Exercise/Intervention    Notes   Basic pelvic anatomy, nature of condition, PT POC 2 min  x    Instruction on precise PFM localization 2 min  x    Post op restrictions, precautions, activity levels 10 min  x And how to progress at wk 5 post op   kegel-quick 1 sec 10 x Do one kegel option every 2 hrs in sitting or lying   kegel-hold 4 sec 10 x    TrA-quick 1 sec 10     TrA-hold 4 sec 10     Pelvic brace-quick 1 sec 10  And how to use the brace during fxn'l strains and transitions while exhaling   Pelvic brace-hold 4 sec 10                     Specific Interventions Next Treatment: progress program as per symptoms    Activity/Treatment Tolerance:  [x]  Patient tolerated treatment well  []  Patient limited by fatigue  []  Patient limited by pain   []  Patient limited by medical complications  []  Other:     Patient Education:   [x]  HEP/Education Completed: Rev'd prior instructions, post op restrictions, and how to progress the restrictions as of wk 5 post op. Progressed kegel endur to 4 sec holds, instructed TrA, pelvic brace, and how to use the brace during fxn'l strains and transitions while exhaling. HO's given. []  No new Education completed  [x]  Reviewed Prior HEP      [x]  Patient verbalized and/or demonstrated understanding of education provided. []  Patient unable to verbalize and/or demonstrate understanding of education provided. Will continue education. [x]  Barriers to learning: none    Assessment: Pt is in need of skilled PT due to recent surgical intervention resulting with mod to signif urinary incont and lack of bladder control. Pt is in need of rehab of the PFM and abdominal mm's, bladder retraining, and general ongoing post op instructions. Pt intends to RTW on a light duty basis as of wk 6 post op.   Body Structures/Functions/Activity Limitations: Recent RALP which resulted with urinary incontinence and lack of bladder control, PFM weakness with decreased localization and endurance, Decreased awareness of functional factors that increase pelvic organ strain, Decreased awareness of normal bladder habits, control, and diet effects on functioning, Abdominal and core weakness with abdominal obesity, Impaired endurance, Impaired motor control and Impaired strength  Prognosis: Good    GOALS:  Patient Goal: have normal bladder control with no leaking    Goal:  1 visit. Pt will be independent with basic HEP for PFM strengthening and localization of PFM and to fully understand post-op precautions and POC. MET    Short term goals: 6 weeks    1. The pt will demo the ability to independently identify normal bladder function, normal voiding patterns, diet impact on the bladder, and urge control strategies to promote continence and to provide pt insight into factors causing/perpetuating symptoms, and to avoid detrimental toileting habits. 2. The pt will demo a basic knowledge of pelvic floor viscera and musculature in order to promote insight into condition. 3.  The pt will demonstrate good PFM localization in order to maximize the efficacy of strengthening program.    4. The pt will report transition to guards (incontinence product) to indicate functional improvement in continence. 5. The pt will report 35 % improvement since starting therapy to indicate efficacy of treatment. 6. The pt will demonstrate independence with basic HEP designed to increase PFM strength, core strength, and hip girdle flexibility for maximal therapeutic outcome. Long term goals: 12 weeks  1. The pt will decrease urinary leakage by 80% since starting therapy to reduce risk of UTI, and to decrease need for incontinence products. 2. The pt will establish normal voiding patterns of daytime every 2 hrs or longer and 1 time nocturia to increase efficiency in his everyday life and to reduce pt frustration with condition. 3. The pt will decrease usage of incontinence care products to 2 guards or less per 24 hrs in order to reduce financial burden on the pt. 4. The pt will be independent with advanced HEP in order to maintain gains made in therapy following discharge. 5. IIQ and RON scores improved to 5 or less to indicate functional improvement with therapy intervention.       6. This pt will increase strength of the pelvic floor to 3/10/10/10 to increase continence and to aid in maintaining results. PLAN:  Treatment Recommendations: Strengthening, Functional Mobility Training, Transfer Training, Endurance Training, Neuromuscular Re-education, Manual Therapy - Soft Tissue Mobilization, Pain Management, Home Exercise Program, Patient Education and Self-Care Education and Training    [x]  Plan of care initiated. Plan to see patient 1 time pre op f/b 1 time every 2 weeks post op for 12 weeks post op to address the treatment planned outlined above.   []  Continue with current plan of care  [x]  Modify plan of care as follows: resume PT post op 1 time every 2 wks for 5-6 visits to achieve normal bladder control with minimal incont  []  Hold pending physician visit  []  Discharge    Time In 1:55   Time Out 2:55   Timed Code Minutes: 45 min   Total Treatment Time: 60 min       Electronically Signed by: ТАТЬЯНА SPRINGER

## 2022-01-04 ENCOUNTER — HOSPITAL ENCOUNTER (OUTPATIENT)
Dept: PHYSICAL THERAPY | Age: 67
Setting detail: THERAPIES SERIES
Discharge: HOME | End: 2022-01-04
Payer: MEDICARE

## 2022-01-04 PROCEDURE — 97530 THERAPEUTIC ACTIVITIES: CPT | Performed by: PHYSICAL THERAPIST

## 2022-01-04 NOTE — PROGRESS NOTES
[address] [facility]  PHYSICAL THERAPY  OUTPATIENT REHABILITATION - SPECIALIZED THERAPY SERVICES  [] PELVIC HEALTH RE-EVALUATION  [x] DAILY NOTE  [] PROGRESS NOTE [] DISCHARGE NOTE    Date: 2022  Patient Name:  Buddy Nj  : 1955  MRN: [id number]  CSN: [id number]    Referring Practitioner Maria L Gómez MD   Diagnosis Pelvic Pain    Treatment Diagnosis M62.58 - Muscle Wasting and Atrophy, nec, other site  M62.89 - Other Specified Disorders of Muscle and R35.1 - Nocturia  R39.14 - Feeling of Incomplete Bladder Emptying   N39.3 male HARPREET R 35.0 frequent urination   Date of Evaluation 21    Additional Pertinent History HTN, obesity      Functional Outcome Measure Used IIQ and RON   Functional Outcome Score 2 and 3 (21)  14 and 6 (2021)      Insurance: Primary: Payor: Desirae Livers /  /  / ,   Secondary: Tr Oleary Information: No pre-cert req'd   Visit # 3, 3/10 for progress note   Visits Allowed: Unlimited based on med necessity   Recertification Date:    Physician Follow-Up:    Physician Orders: eval and treat   History of Present Illness: RALP 2021; pre op had weak urine stream, incomplete emptying, urinary frequency, known enlarged prostate     SUBJECTIVE: Pt reports his bladder is making definite improvements. Pt relates a strong but brief urine stream, but his bladder is starting to hold better volumes, nocturia has decreased to once. Pt's dribbling is less frequent, but he still leaks a little with sneeze/cough in spite of doing pelvic brace. Pt is not having much leaking with basic transfers or with walking now. Pt feels like he is ready to RTW; I advised him this would be recommended on a light duty basis initially as he is still on lift restriction (and his normal job duties are quite physically demanding). Pt states light duty is an option at his place of employment, and again he feels ready.   Pt is frustrated with limited penile length. Social/Functional History and Current Status:  Medications and Allergies have been reviewed and are listed on Medical History Questionnaire. Pt did is not on Flomax post op. Kendra Kilpatrick lives with significant other in a single story home with a level surface to enter. Task Previous Current   ADLs  Independent Independent   IADL's Independent signif post op urinary incont and lack of bladder control   Ambulation Independent Has dribbling with walking   Transfers Independent Leaks with basic transfers   Recreation Independent likes to be active with house work and yard work   Community Integration Independent Independent   Driving Active  Active    Work Known. 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        PAIN -- pt denies consistent pain   Pt denies post op pain or hematuria    BLADDER ASSESSMENT [] Deferred secondary to:   Daily Fluid Ingestion: 48oz water, 1 Pepsi   Urination Frequency Times/Day: every 1 hour or so  Times/Night: 1   Volume medium   Urge Sensation Normal    SYMPTOM QUESTIONNAIRE   Loses Urine Upon: sneeze, cough   Incontinence Volume: small   Frequency of Leakage: occas   Wets the Bed: Damp to dry for sleep   Burning/Pain with Urination: no   Difficulty Starting a Stream of Urine: no   Incomplete Emptying no   Strain to Empty Bladder: no   Falling Out Feeling: n/a   Urinate more than 7 times/day: yes   Use a form of Leakage Protection: 2 Depends lined with pad (2-3) per 24 hrs   Restrict Fluid Intake: no   Stream Strength strong       BOWEL ASSESSMENT [] Deferred secondary to:   Frequency: Bid to every other day   Most Common Stool Consistency: soft   SYMPTOM QUESTIONNAIRE   Strain to have a BM: no   Include fiber in your diet: yes: fruits   Take laxatives/enemas regularly: no   Pain with BM: no   Strong urge to have BM: no   Leak/Stain Feces: no   Diarrhea often: no         SEXUAL ASSESSMENT [] Deferred secondary to: Sexually Active Has had ED for about 1 year     VITALS [] Deferred secondary to:   Height 6'0\"   Weight 270#       GENERAL ASSESSMENT   [] Deferred secondary to:   Palpation    Observation Abdominal incisions healing well   Posture Forward Head Posture and abdominal obesity   Range of Motion Lumbar and B LE AROM WFL and without c/o   Strength B LE strength WNL and without c/o; abdominal strength NT due to recent surgery   Gait WFL   Sensation WFL   Edema mild pitting edema B LE's   Balance/Fall History Denies balance or fall problems   Special Tests Neg SLR, ave hams flexi; Neg Homans           OBSERVATION  [] Deferred secondary to:   Patient Safety Patient offered a chaperone and declined.    Skin Condition intact   Urogenital Triangle No tenderness or tightness reported       PELVIC FLOOR EXTERNAL EXAM [] Deferred secondary to:   Exam perineal body   Sensation Intact   Muscle Localization Good minus but pt must concentrate   Palpation/Tone Normal   Pelvic Floor Strength PERF:Power: 1+to 2-,Endurance: 4,Reps: 10,Flicks: 10   Relax after Contraction Normal       TREATMENT   Precautions:     X in shaded column indicates activity completed today   Modalities Parameters/  Location  Notes                     Manual Therapy Time/Technique  Notes                     Exercise/Intervention    Notes   Basic pelvic anatomy, nature of condition, PT POC 2 min  x    Instruction on precise PFM localization 2 min  x    Post op restrictions, precautions, activity levels 2 min  x And how to progress at wk 5 post op   kegel-quick 1 sec 10 x Do one kegel option every 2 hrs in sitting or lying   kegel-hold 5 sec 10 x    TrA-quick 1 sec 10 x    TrA-hold 5 sec 10 x    Pelvic brace-quick 1 sec 10 x And how to use the brace during fxn'l strains and transitions while exhaling   Pelvic brace-hold 5 sec 10 x    kegel with hip IR 5 sec 10 x    kegel with hip ER 5 sec 10 x           Pelvic tilt 5 sec 10 x bid                   Specific Interventions Next Treatment: progress program as per symptoms    Activity/Treatment Tolerance:  [x]  Patient tolerated treatment well  []  Patient limited by fatigue  []  Patient limited by pain   []  Patient limited by medical complications  []  Other:     Patient Education:   [x]  HEP/Education Completed: Rev'd prior instructions with pt and progressed all kegel endur to 5 sec holds. Encouraged pt to work at using only a pad and no Depends. Reminded fxn'l use of pelvic brace during strains. Progressed by instructing kegel with hip IR and ER along with pelvic tilt. Instructed importance of gaining abdominal control and sequencing in inf to sup manner to provide optimal bladder support. HO's given along with checklist grid of home routine to this point. []  No new Education completed  [x]  Reviewed Prior HEP      [x]  Patient verbalized and/or demonstrated understanding of education provided. []  Patient unable to verbalize and/or demonstrate understanding of education provided. Will continue education. [x]  Barriers to learning: none    Assessment: Pt's bladder control has made substantial improvements in these past 2 wks. Pt really only notices sm amnts of incont now with sneeze and cough, damp to dry for sleep, and bladder is starting to hold better volumes. Pt working with home PT instructions and asks approp questions re: his recovery. Body Structures/Functions/Activity Limitations: Recent RALP which resulted with urinary incontinence and lack of bladder control, PFM weakness with decreased localization and endurance, Decreased awareness of functional factors that increase pelvic organ strain, Decreased awareness of normal bladder habits, control, and diet effects on functioning, Abdominal and core weakness with abdominal obesity, Impaired endurance, Impaired motor control and Impaired strength  Prognosis: Good    GOALS:  Patient Goal: have normal bladder control with no leaking    Goal:  1 visit. Pt will be independent with basic HEP for PFM strengthening and localization of PFM and to fully understand post-op precautions and POC. MET    Short term goals: 6 weeks    1. The pt will demo the ability to independently identify normal bladder function, normal voiding patterns, diet impact on the bladder, and urge control strategies to promote continence and to provide pt insight into factors causing/perpetuating symptoms, and to avoid detrimental toileting habits. 2. The pt will demo a basic knowledge of pelvic floor viscera and musculature in order to promote insight into condition. 3.  The pt will demonstrate good PFM localization in order to maximize the efficacy of strengthening program.    4. The pt will report transition to guards (incontinence product) to indicate functional improvement in continence. 5. The pt will report 35 % improvement since starting therapy to indicate efficacy of treatment. 6. The pt will demonstrate independence with basic HEP designed to increase PFM strength, core strength, and hip girdle flexibility for maximal therapeutic outcome. Long term goals: 12 weeks  1. The pt will decrease urinary leakage by 80% since starting therapy to reduce risk of UTI, and to decrease need for incontinence products. 2. The pt will establish normal voiding patterns of daytime every 2 hrs or longer and 1 time nocturia to increase efficiency in his everyday life and to reduce pt frustration with condition. 3. The pt will decrease usage of incontinence care products to 2 guards or less per 24 hrs in order to reduce financial burden on the pt. 4. The pt will be independent with advanced HEP in order to maintain gains made in therapy following discharge. 5. IIQ and RON scores improved to 5 or less to indicate functional improvement with therapy intervention.       6. This pt will increase strength of the pelvic floor to 3/10/10/10 to increase continence and to aid in maintaining results. PLAN:  Treatment Recommendations: Strengthening, Functional Mobility Training, Transfer Training, Endurance Training, Neuromuscular Re-education, Manual Therapy - Soft Tissue Mobilization, Pain Management, Home Exercise Program, Patient Education and Self-Care Education and Training    [x]  Plan of care initiated. Plan to see patient 1 time pre op f/b 1 time every 2 weeks post op for 12 weeks post op to address the treatment planned outlined above.   []  Continue with current plan of care  [x]  Modify plan of care as follows: resume PT post op 1 time every 2 wks for 5-6 visits to achieve normal bladder control with minimal incont  []  Hold pending physician visit  []  Discharge    Time In 7:57   Time Out 8:52   Timed Code Minutes: 55 min   Total Treatment Time: 55 min       Electronically Signed by: ТАТЬЯНА SPRINGER

## 2022-01-18 ENCOUNTER — HOSPITAL ENCOUNTER (OUTPATIENT)
Dept: PHYSICAL THERAPY | Age: 67
Setting detail: THERAPIES SERIES
Discharge: HOME | End: 2022-01-18
Payer: MEDICARE

## 2022-01-18 PROCEDURE — 97530 THERAPEUTIC ACTIVITIES: CPT | Performed by: PHYSICAL THERAPIST

## 2022-01-18 NOTE — PROGRESS NOTES
[address] [facility]  PHYSICAL THERAPY  OUTPATIENT REHABILITATION - SPECIALIZED THERAPY SERVICES  [] PELVIC HEALTH RE-EVALUATION  [x] DAILY NOTE  [] PROGRESS NOTE [] DISCHARGE NOTE    Date: 2022  Patient Name:  Yomi Morgan  : 1955  MRN: [id number]  CSN: [id number]    Referring Practitioner Ej Varela MD   Diagnosis Prostate    Treatment Diagnosis M62.58 - Muscle Wasting and Atrophy, nec, other site  M62.89 - Other Specified Disorders of Muscle and R35.1 - Nocturia  R39.14 - Feeling of Incomplete Bladder Emptying   N39.3 male HARPREET R 35.0 frequent urination   Date of Evaluation 21    Additional Pertinent History HTN, obesity      Functional Outcome Measure Used IIQ and RON   Functional Outcome Score 2 and 3 (21)  14 and 6 (2021)      Insurance: Primary: Payor: Nora Donahue /  /  / ,   Secondary: Selena Tiwari: No pre-cert req'd   Visit # 4, 4/10 for progress note   Visits Allowed: Unlimited based on med necessity   Recertification Date:    Physician Follow-Up: 2022   Physician Orders: eval and treat   History of Present Illness: RALP 2021; pre op had weak urine stream, incomplete emptying, urinary frequency, known enlarged prostate     SUBJECTIVE: Pt RTW last week, 1/10/22 on a light duty basis. He thinks he pulled a mm in his L LB 2 days later, 22 while helping a co-worker as a quick reaction to an object being dropped. Pt endured a forceful twist of his spine. Pt has been taking Tylenol and using ice, relative rest.  Pt reports only slight improvement so far, denies radicular symptoms, denies pain with sneeze or cough, denies changes in bladder control, denies hematuria. Pt does not have increased LBP with pelvic tilt or any other current act's in his PT routine. Pt is seeing PCP Dr Margaux Fernandez later today. Pt states his bladder control is still improving.   He does have some urgency as his bladder gets full, and he can have some incont with waiting too long to void. He cont to have a strong but brief urine stream.  Pt is frustrated with limited penile length. Social/Functional History and Current Status:  Medications and Allergies have been reviewed and are listed on Medical History Questionnaire. Pt did is not on Flomax post op. Wen Pratt lives with significant other in a single story home with a level surface to enter. Task Previous Current   ADLs  Independent Independent   IADL's Independent signif post op urinary incont and lack of bladder control   Ambulation Independent Has dribbling with walking   Transfers Independent Leaks with basic transfers   Recreation Independent likes to be active with house work and yard work   Community Integration Independent Independent   Driving Active  Active    Work Lab[employer]. 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        PAIN -- pt denies consistent pain   Pt denies post op pain or hematuria    BLADDER ASSESSMENT [] Deferred secondary to:   Daily Fluid Ingestion: 48oz water, 1 Pepsi   Urination Frequency Times/Day: every 1 hour or so  Times/Night: 1   Volume medium   Urge Sensation Normal  but gets strong as bladder gets more full   SYMPTOM QUESTIONNAIRE   Loses Urine Upon: Waiting too long   Incontinence Volume: small   Frequency of Leakage: occas   Wets the Bed: no   Burning/Pain with Urination: no   Difficulty Starting a Stream of Urine: no   Incomplete Emptying no   Strain to Empty Bladder: no   Falling Out Feeling: n/a   Urinate more than 7 times/day: yes   Use a form of Leakage Protection: 1 Depends for work lined with pad (3); pad only once home and then uses 2-3 more pads (a total of 5-6 pads per 24 hrs plus 1 Depends if it's a work day)   Restrict Fluid Intake: no   Stream Strength Strong but brief       BOWEL ASSESSMENT [] Deferred secondary to:   Frequency: Bid to every other day   Most Common Stool Consistency: soft   SYMPTOM QUESTIONNAIRE   Strain to have a BM: no   Include fiber in your diet: yes: fruits   Take laxatives/enemas regularly: no   Pain with BM: no   Strong urge to have BM: no   Leak/Stain Feces: no   Diarrhea often: no         SEXUAL ASSESSMENT [] Deferred secondary to:   Sexually Active Has had ED for about 1 year     VITALS [] Deferred secondary to:   Height 6'0\"   Weight 270#       GENERAL ASSESSMENT   [] Deferred secondary to:   Palpation    Observation Abdominal incisions healing well   Posture Forward Head Posture and abdominal obesity   Range of Motion Lumbar and B LE AROM WFL and without c/o   Strength B LE strength WNL and without c/o; abdominal strength NT due to recent surgery   Gait WFL   Sensation WFL   Edema mild pitting edema B LE's   Balance/Fall History Denies balance or fall problems   Special Tests Neg SLR, ave hams flexi; Neg Homans           OBSERVATION  [] Deferred secondary to:   Patient Safety Patient offered a chaperone and declined.    Skin Condition intact   Urogenital Triangle No tenderness or tightness reported       PELVIC FLOOR EXTERNAL EXAM [] Deferred secondary to:   Exam perineal body   Sensation Intact   Muscle Localization Good minus but pt must concentrate   Palpation/Tone Normal   Pelvic Floor Strength PERF:Power: 1+to 2-,Endurance: 4,Reps: 10,Flicks: 10   Relax after Contraction Normal       TREATMENT   Precautions:     X in shaded column indicates activity completed today   Modalities Parameters/  Location  Notes                     Manual Therapy Time/Technique  Notes                     Exercise/Intervention    Notes   Basic pelvic anatomy, nature of condition, PT POC 2 min  x    Instruction on precise PFM localization 2 min  x    Post op restrictions, precautions, activity levels 2 min  x And how to progress at wk 5 post op   Urge control strategies 10 min  x    kegel-quick 1 sec 10 x Do one kegel option every 2 hrs in sitting or lying   kegel-hold 6 sec 10 x Gain 1 sec per wk to a max of 10 sec   TrA-quick 1 sec 10 x    TrA-hold 6 sec 10 x    Pelvic brace-quick 1 sec 10 x And how to use the brace during fxn'l strains and transitions while exhaling   Pelvic brace-hold 6 sec 10 x    kegel with hip IR 6 sec 10 x    kegel with hip ER 6 sec 10 x           LTR 3 sec 10 x    Pelvic tilt 5 sec 10 x bid   Pelvic tilt with UE  10 x    Pelvic tilt with LE  10 x    Pelvic tilt with opp 2 sets 5 x                    Specific Interventions Next Treatment: progress program as per symptoms    Activity/Treatment Tolerance:  [x]  Patient tolerated treatment well  []  Patient limited by fatigue  []  Patient limited by pain   []  Patient limited by medical complications  []  Other:     Patient Education:   [x]  HEP/Education Completed: Rev'd prior instructions with pt and progressed all kegel endur to 6 sec holds. Instructed pt to cont to progress by 1 sec per wk to a max of 10 sec as long as the quality discussed in clinic is present. Instructed urge control strategies with HO given. Also instructed LTR which felt good to pt's L LB. Cont to encourage pt to work at tapering protection as approp. Reminded fxn'l use of pelvic brace during strains. Progressed by instructing pelvic tilt options as listed. HO's given of new progressions and instructions along with checklist grid of home routine to this point. []  No new Education completed  [x]  Reviewed Prior HEP      [x]  Patient verbalized and/or demonstrated understanding of education provided. []  Patient unable to verbalize and/or demonstrate understanding of education provided. Will continue education. [x]  Barriers to learning: none    Assessment: Pt's bladder control is cont to steadily improve. Pt really only leaks with waiting too long to void.   His daytime void freq is still a bit freq, and he does have some urgency, so instructed urge control strategies and encouraged pt to work at delaying voiding in 5-10 min increments. Good home PT compliance, and today's new progressions did not increase LBP. Body Structures/Functions/Activity Limitations: Recent RALP which resulted with urinary incontinence and lack of bladder control, PFM weakness with decreased localization and endurance, Decreased awareness of functional factors that increase pelvic organ strain, Decreased awareness of normal bladder habits, control, and diet effects on functioning, Abdominal and core weakness with abdominal obesity, Impaired endurance, Impaired motor control and Impaired strength  Prognosis: Good    GOALS:  Patient Goal: have normal bladder control with no leaking    Goal:  1 visit. Pt will be independent with basic HEP for PFM strengthening and localization of PFM and to fully understand post-op precautions and POC. MET    Short term goals: 6 weeks    1. The pt will demo the ability to independently identify normal bladder function, normal voiding patterns, diet impact on the bladder, and urge control strategies to promote continence and to provide pt insight into factors causing/perpetuating symptoms, and to avoid detrimental toileting habits. 2. The pt will demo a basic knowledge of pelvic floor viscera and musculature in order to promote insight into condition. 3.  The pt will demonstrate good PFM localization in order to maximize the efficacy of strengthening program.    4. The pt will report transition to guards (incontinence product) to indicate functional improvement in continence. 5. The pt will report 35 % improvement since starting therapy to indicate efficacy of treatment. 6. The pt will demonstrate independence with basic HEP designed to increase PFM strength, core strength, and hip girdle flexibility for maximal therapeutic outcome. Long term goals: 12 weeks  1. The pt will decrease urinary leakage by 80% since starting therapy to reduce risk of UTI, and to decrease need for incontinence products.     2. The pt will establish normal voiding patterns of daytime every 2 hrs or longer and 1 time nocturia to increase efficiency in his everyday life and to reduce pt frustration with condition. 3. The pt will decrease usage of incontinence care products to 2 guards or less per 24 hrs in order to reduce financial burden on the pt. 4. The pt will be independent with advanced HEP in order to maintain gains made in therapy following discharge. 5. IIQ and RON scores improved to 5 or less to indicate functional improvement with therapy intervention. 6. This pt will increase strength of the pelvic floor to 3/10/10/10 to increase continence and to aid in maintaining results. PLAN:  Treatment Recommendations: Strengthening, Functional Mobility Training, Transfer Training, Endurance Training, Neuromuscular Re-education, Manual Therapy - Soft Tissue Mobilization, Pain Management, Home Exercise Program, Patient Education and Self-Care Education and Training    [x]  Plan of care initiated. Plan to see patient 1 time pre op f/b 1 time every 2 weeks post op for 12 weeks post op to address the treatment planned outlined above.   []  Continue with current plan of care  [x]  Modify plan of care as follows: resume PT post op 1 time every 2 wks for 5-6 visits to achieve normal bladder control with minimal incont  []  Hold pending physician visit  []  Discharge    Time In 7:57   Time Out 8:57   Timed Code Minutes: 60 min   Total Treatment Time: 60 min       Electronically Signed by: ТАТЬЯНА SPRINGER

## 2022-01-24 ENCOUNTER — OFFICE VISIT (OUTPATIENT)
Dept: UROLOGY | Age: 67
End: 2022-01-24
Payer: MEDICARE

## 2022-01-24 VITALS — BODY MASS INDEX: 36.3 KG/M2 | HEIGHT: 72 IN | WEIGHT: 268 LBS

## 2022-01-24 DIAGNOSIS — R33.9 INCOMPLETE BLADDER EMPTYING: ICD-10-CM

## 2022-01-24 DIAGNOSIS — R30.0 DYSURIA: ICD-10-CM

## 2022-01-24 DIAGNOSIS — C61 PROSTATE CANCER (HCC): Primary | ICD-10-CM

## 2022-01-24 LAB
BILIRUBIN URINE: ABNORMAL
BLOOD URINE, POC: NEGATIVE
CHARACTER, URINE: CLEAR
COLOR, URINE: YELLOW
GLUCOSE URINE: NEGATIVE MG/DL
KETONES, URINE: ABNORMAL
LEUKOCYTE CLUMPS, URINE: ABNORMAL
NITRITE, URINE: NEGATIVE
PH, URINE: 5.5 (ref 5–9)
POST VOID RESIDUAL (PVR): 67 ML
PROTEIN, URINE: 30 MG/DL
SPECIFIC GRAVITY, URINE: >= 1.03 (ref 1–1.03)
UROBILINOGEN, URINE: 0.2 EU/DL (ref 0–1)

## 2022-01-24 PROCEDURE — G8427 DOCREV CUR MEDS BY ELIG CLIN: HCPCS | Performed by: PHYSICIAN ASSISTANT

## 2022-01-24 PROCEDURE — 1036F TOBACCO NON-USER: CPT | Performed by: PHYSICIAN ASSISTANT

## 2022-01-24 PROCEDURE — 99024 POSTOP FOLLOW-UP VISIT: CPT | Performed by: PHYSICIAN ASSISTANT

## 2022-01-24 PROCEDURE — 51798 US URINE CAPACITY MEASURE: CPT | Performed by: PHYSICIAN ASSISTANT

## 2022-01-24 PROCEDURE — 81003 URINALYSIS AUTO W/O SCOPE: CPT | Performed by: PHYSICIAN ASSISTANT

## 2022-01-24 PROCEDURE — 4040F PNEUMOC VAC/ADMIN/RCVD: CPT | Performed by: PHYSICIAN ASSISTANT

## 2022-01-24 PROCEDURE — G8417 CALC BMI ABV UP PARAM F/U: HCPCS | Performed by: PHYSICIAN ASSISTANT

## 2022-01-24 PROCEDURE — 3017F COLORECTAL CA SCREEN DOC REV: CPT | Performed by: PHYSICIAN ASSISTANT

## 2022-01-24 PROCEDURE — 1123F ACP DISCUSS/DSCN MKR DOCD: CPT | Performed by: PHYSICIAN ASSISTANT

## 2022-01-24 PROCEDURE — G8484 FLU IMMUNIZE NO ADMIN: HCPCS | Performed by: PHYSICIAN ASSISTANT

## 2022-01-24 RX ORDER — SILDENAFIL 25 MG/1
25 TABLET, FILM COATED ORAL PRN
Qty: 30 TABLET | Refills: 5 | Status: SHIPPED | OUTPATIENT
Start: 2022-01-24 | End: 2023-01-24

## 2022-01-24 NOTE — PROGRESS NOTES
[address] [address] [place]mahon [address]  Dept: [phone]  Loc: [phone]      Mr. Daquan Rosen was seen in follow up for   Chief Complaint   Patient presents with    Prostate Cancer    Follow-up        HPI:    Mr. Daquan Rosen is a 59-year-old male status post RALP with Bilateral Pelvic Lymph Node Dissection, Placement of Clarix Cord under the auspices of Dr. Carolina Jolly on 12/2/21. His pathology was significant for Kelly 3+4=7 Adenocarcinoma of the Prostate with negative margins, no EPE, and negative nodes, pT2. He states that he is doing well since surgery. He reports a >50% improvement in his urinary continence since his catheter was removed six weeks ago. He reports that his leakage is mild and typically occurs with movement from sitting to standing. He is working with PT for pelvic floor strengthening. He has not noticed any sensations in the genital area at this time. He is ambulating and not experiencing any pain. He denies fever, chills, pelvic pain, dyspnea, chest pain, N/V, leg pain, or lightheadedness. He presents today for further evaluation. Past Medical History:   Diagnosis Date    Cancer [facility])     prostate    Hypertension, essential     Obesity (BMI 30-39. 9)     Psoriasis        Past Surgical History:   Procedure Laterality Date    KNEE ARTHROSCOPY      right    PROSTATE BIOPSY  2021    PROSTATECTOMY Bilateral 12/2/2021    ROBOTIC ASSISTED LAPAROSCOPIC RADICAL PROSTECTOMY WITH BILATERAL LYMPH NODE DISSECTION performed by Zach Cortez MD at [place] [address]         Current Outpatient Medications on File Prior to Visit   Medication Sig Dispense Refill    lisinopril (PRINIVIL;ZESTRIL) 30 MG tablet Take 1 tablet by mouth daily 90 tablet 3    amLODIPine (NORVASC) 10 MG tablet Take 1 tablet by mouth daily 90 tablet 3     No current facility-administered medications on file prior to visit.        No Known Allergies    Family History   Problem Relation Age of Onset    Heart Disease Father     Colon Cancer Neg Hx     Prostate Cancer Neg Hx        Social History     Socioeconomic History    Marital status:      Spouse name: Not on file    Number of children: Not on file    Years of education: Not on file    Highest education level: Not on file   Occupational History    Not on file   Tobacco Use    Smoking status: Never Smoker    Smokeless tobacco: Never Used   Vaping Use    Vaping Use: Never used   Substance and Sexual Activity    Alcohol use: No    Drug use: No    Sexual activity: Not on file   Other Topics Concern    Not on file   Social History Narrative    Not on file     Social Determinants of Health     Financial Resource Strain:     Difficulty of Paying Living Expenses: Not on file   Food Insecurity:     Worried About [employer] in the Last Year: Not on file    [address] [employer] in the Last Year: Not on file   Transportation Needs:     Lack of Transportation (Medical): Not on file    Lack of Transportation (Non-Medical):  Not on file   Physical Activity:     Days of Exercise per Week: Not on file    Minutes of Exercise per Session: Not on file   Stress:     Feeling of Stress : Not on file   Social Connections:     Frequency of Communication with Friends and Family: Not on file    Frequency of Social Gatherings with Friends and Family: Not on file    Attends Advent Services: Not on file    Active Member of Clubs or Organizations: Not on file    Attends Club or Organization Meetings: Not on file    Marital Status: Not on file   Intimate Partner Violence:     Fear of Current or Ex-Partner: Not on file    Emotionally Abused: Not on file    Physically Abused: Not on file    Sexually Abused: Not on file   Housing Stability:     Unable to Pay for Housing in the Last Year: Not on file    Number of [place] in the Last Year: Not on file    Unstable Housing in the Last Year: Not on file       Review of Systems  Constitutional: Negative for fatigue, fever and unexpected weight change. HENT: Negative for congestion and trouble swallowing. Eyes: Negative for pain and itching. Respiratory: Negative for cough and shortness of breath. Cardiovascular: Negative for chest pain and leg swelling. Gastrointestinal: Negative for abdominal pain, constipation, diarrhea and nausea. Endocrine: Negative for cold intolerance and heat intolerance. Genitourinary: See HPI. Reports weakened stream. Denies frequency and urgency. Musculoskeletal: Negative for back pain and joint swelling. Skin: Negative for rash. Neurological: Negative for dizziness, weakness, numbness and headaches. Psychiatric/Behavioral: The patient is not nervous/anxious. Exam    Ht 6' (1.829 m)   Wt 268 lb (121.6 kg)   BMI 36.35 kg/m²     Constitutional: Oriented to person, place, and time. Vital signs are normal. Appears well-developed and well-nourished. Cooperative. No distress. HENT:    Head: Normocephalic and atraumatic. Eyes: EOM are normal. Pupils are equal, round, and reactive to light. Right eye exhibits no discharge. Left eye exhibits no discharge. No scleral icterus. Neck: Trachea normal. No JVD present. Cardiovascular: Normal rate and regular rhythm. S1/S2. Pulmonary/Chest: Effort normal. No respiratory distress. No wheezes, rhonchi, or rales. Abdominal: Soft. Exhibits no distension or generalized tenderness. There is no rebound, rigidity, or guarding. No CVA tenderness. Musculoskeletal: No pitting edema or calf tenderness. Lymphadenopathy:   Right: No supraclavicular adenopathy present. Left: No supraclavicular adenopathy present. Neurological: Alert and oriented to person, place, and time. No cranial nerve deficit. Skin: Skin is warm and dry. Not diaphoretic. Psychiatric: Normal mood and affect.  Behavior is normal.   Nursing note and vitals reviewed. Labs    Results for POC orders placed in visit on 01/24/22   POCT Urinalysis No Micro (Auto)   Result Value Ref Range    Glucose, Ur Negative NEGATIVE mg/dl    Bilirubin Urine Small (A)     Ketones, Urine Trace (A) NEGATIVE    Specific Gravity, Urine >= 1.030 1.002 - 1.030    Blood, UA POC Negative NEGATIVE    pH, Urine 5.50 5.0 - 9.0    Protein, Urine 30 (A) NEGATIVE mg/dl    Urobilinogen, Urine 0.20 0.0 - 1.0 eu/dl    Nitrite, Urine Negative NEGATIVE    Leukocyte Clumps, Urine Small (A) NEGATIVE    Color, Urine Yellow YELLOW-STRAW    Character, Urine Clear [id number]   poct post void residual   Result Value Ref Range    post void residual 67 ml       Lab Results   Component Value Date    CREATININE 0.9 12/03/2021    BUN 18 12/03/2021     12/03/2021    K 4.6 12/03/2021     12/03/2021    CO2 25 12/03/2021       Lab Results   Component Value Date    PSA 9.58 (H) 08/18/2021    PSA 9.24 (H) 07/13/2021         Assessment/Plan:    1. Prostate Cancer- Status post RALP with Bilateral Pelvic Lymph Node Dissection, Placement of Clarix Cord under the auspices of Dr. Jessica Arias on 12/2/21. His pathology was significant for Kelly 3+4=7 Adenocarcinoma of the Prostate with negative margins, no EPE, and negative nodes, pT2. He did not have his PSA obtained today. He was instructed to have it drawn as soon as possible. Next PSA and appointment in 3 months. Continue PFE exercises. Will start Viagra 25 mg daily. May go back to work without restrictions. 2. History of Nephrolithiasis-     3. HTN- On Norvasc.

## 2022-01-25 LAB
ORGANISM: ABNORMAL
URINE CULTURE, ROUTINE: ABNORMAL

## 2022-01-26 ENCOUNTER — TELEPHONE (OUTPATIENT)
Dept: UROLOGY | Age: 67
End: 2022-01-26

## 2022-01-26 DIAGNOSIS — N30.00 ACUTE CYSTITIS WITHOUT HEMATURIA: Primary | ICD-10-CM

## 2022-01-26 RX ORDER — SULFAMETHOXAZOLE AND TRIMETHOPRIM 800; 160 MG/1; MG/1
1 TABLET ORAL 2 TIMES DAILY
Qty: 20 TABLET | Refills: 0 | Status: SHIPPED | OUTPATIENT
Start: 2022-01-26 | End: 2022-02-05

## 2022-01-26 NOTE — TELEPHONE ENCOUNTER
Gogo jacobo HIPPA advised of the urine results and Bactrim sent for treatment. She voiced understanding.

## 2022-02-07 ENCOUNTER — NURSE ONLY (OUTPATIENT)
Dept: LAB | Age: 67
End: 2022-02-07

## 2022-02-07 DIAGNOSIS — C61 PROSTATE CANCER (HCC): ICD-10-CM

## 2022-02-07 LAB — PROSTATE SPECIFIC ANTIGEN: < 0.02 NG/ML (ref 0–1)

## 2022-02-23 ENCOUNTER — HOSPITAL ENCOUNTER (OUTPATIENT)
Dept: PHYSICAL THERAPY | Age: 67
Setting detail: THERAPIES SERIES
Discharge: HOME | End: 2022-02-23
Payer: MEDICARE

## 2022-02-23 PROCEDURE — 97110 THERAPEUTIC EXERCISES: CPT

## 2022-02-23 PROCEDURE — 97530 THERAPEUTIC ACTIVITIES: CPT

## 2022-02-23 PROCEDURE — 97112 NEUROMUSCULAR REEDUCATION: CPT

## 2022-02-23 NOTE — PROGRESS NOTES
[id number] [facility]  PHYSICAL THERAPY  OUTPATIENT REHABILITATION - SPECIALIZED THERAPY SERVICES  [] PELVIC HEALTH RE-EVALUATION  [x] DAILY NOTE  [] PROGRESS NOTE [] DISCHARGE NOTE    Date: 2022  Patient Name:  Gabriel Mahoney  : 1955  MRN: [id number]  CSN: [id number]    Referring Practitioner Ruthann Rogers MD   Diagnosis Malignant neoplasm of prostate [C61]    Treatment Diagnosis M62.58 - Muscle Wasting and Atrophy, nec, other site  M62.89 - Other Specified Disorders of Muscle and R35.1 - Nocturia  R39.14 - Feeling of Incomplete Bladder Emptying   N39.3 male HARPREET R 35.0 frequent urination   Date of Evaluation 21    Additional Pertinent History HTN, obesity      Functional Outcome Measure Used IIQ and RON   Functional Outcome Score 2 and 3 (21)  14 and 6 (2021)      Insurance: Primary: Payor: Shante Sotomayor /  /  / ,   Secondary: Dyana Bob Information: No pre-cert req'd   Visit # 5, 5/10 for progress note   Visits Allowed: Unlimited based on med necessity   Recertification Date: 3/46/6744   Physician Follow-Up: 2022   Physician Orders: eval and treat   History of Present Illness: RALP 2021; pre op had weak urine stream, incomplete emptying, urinary frequency, known enlarged prostate     SUBJECTIVE: Patient reports fall on ice approximately 2 weeks ago with a left wrist sprain and right knee and shoulder irritation. Patient denies swelling but still mild pain. Patient reports follow up with Dr. Barbie Chatterjee with no restrictions. He reports follow up in April. Pt states his bladder control is still improving. He does have some urgency as his bladder gets full, but is able to delay voids better. Patients frequency is also improving as he is now voiding every hour and half. He report using 2-3 guards per during the day. He is currently using 1 guard at night but is waking up dry.      Social/Functional History and Current Status:  Medications and Allergies have been reviewed and are listed on Medical History Questionnaire. Pt did is not on Flomax post op. Jeanne Segovia lives with significant other in a single story home with a level surface to enter. Task Previous Current   ADLs  Independent Independent   IADL's Independent signif post op urinary incont and lack of bladder control   Ambulation Independent Has dribbling with walking   Transfers Independent Leaks with basic transfers   Recreation Independent likes to be active with house work and yard work   Community Integration Independent Independent   Driving Active  Active    Work [employer]. 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        PAIN -- pt denies consistent pain   Pt denies post op pain or hematuria    BLADDER ASSESSMENT [] Deferred secondary to:   Daily Fluid Ingestion: 48oz water, 1 Pepsi   Urination Frequency Times/Day: every 1 hour or so  Times/Night: 1   Volume medium   Urge Sensation Normal  but gets strong as bladder gets more full   SYMPTOM QUESTIONNAIRE   Loses Urine Upon: Waiting too long   Incontinence Volume: small   Frequency of Leakage: occas   Wets the Bed: no   Burning/Pain with Urination: no   Difficulty Starting a Stream of Urine: no   Incomplete Emptying no   Strain to Empty Bladder: no   Falling Out Feeling: n/a   Urinate more than 7 times/day: yes   Use a form of Leakage Protection: 1 Depends for work lined with pad (3); pad only once home and then uses 2-3 more pads (a total of 5-6 pads per 24 hrs plus 1 Depends if it's a work day)   Restrict Fluid Intake: no   Stream Strength Strong but brief       BOWEL ASSESSMENT [] Deferred secondary to:   Frequency: Bid to every other day   Most Common Stool Consistency: soft   SYMPTOM QUESTIONNAIRE   Strain to have a BM: no   Include fiber in your diet: yes: fruits   Take laxatives/enemas regularly: no   Pain with BM: no   Strong urge to have BM: no   Leak/Stain Feces: no   Diarrhea often: no SEXUAL ASSESSMENT [] Deferred secondary to:   Sexually Active Has had ED for about 1 year     VITALS [] Deferred secondary to:   Height 6'0\"   Weight 270#       GENERAL ASSESSMENT   [] Deferred secondary to:   Palpation    Observation Abdominal incisions healing well   Posture Forward Head Posture and abdominal obesity   Range of Motion Lumbar and B LE AROM WFL and without c/o   Strength B LE strength WNL and without c/o; abdominal strength NT due to recent surgery   Gait WFL   Sensation WFL   Edema mild pitting edema B LE's   Balance/Fall History Denies balance or fall problems   Special Tests Neg SLR, ave hams flexi; Neg Homans           OBSERVATION  [] Deferred secondary to:   Patient Safety Patient offered a chaperone and declined.    Skin Condition intact   Urogenital Triangle No tenderness or tightness reported       PELVIC FLOOR EXTERNAL EXAM [] Deferred secondary to:   Exam perineal body   Sensation Intact   Muscle Localization Good minus but pt must concentrate   Palpation/Tone Normal   Pelvic Floor Strength PERF:Power: 1+to 2-,Endurance: 4,Reps: 10,Flicks: 10   Relax after Contraction Normal       TREATMENT   Precautions:     X in shaded column indicates activity completed today   Modalities Parameters/  Location  Notes                     Manual Therapy Time/Technique  Notes                     Exercise/Intervention    Notes   Basic pelvic anatomy, nature of condition, PT POC 2 min  x    Instruction on precise PFM localization 2 min  x    Post op restrictions, precautions, activity levels 2 min  x And how to progress at wk 5 post op   Urge control strategies 10 min  x    kegel-quick 1 sec 10 x Do one kegel option every 2 hrs in sitting or lying   kegel-hold 7 sec 10 x Gain 1 sec per wk to a max of 10 sec   TrA-quick 1 sec 10 x    TrA-hold 7 sec 10 x    Pelvic brace-quick 1 sec 10 x And how to use the brace during fxn'l strains and transitions while exhaling   Pelvic brace-hold 7 sec 10 x    kegel with hip IR 7 sec 10 x    kegel with hip ER 7 sec 10 x           LTR 3 sec 10 x    Pelvic tilt 5 sec 10 x bid   Pelvic tilt with UE  10 x    Pelvic tilt with LE  10 x    Pelvic tilt with opp 2 sets 5 x    Pelvic tilt, ball squeeze, bridges, and kegel  10 x             Specific Interventions Next Treatment: progress program as per symptoms    Activity/Treatment Tolerance:  [x]  Patient tolerated treatment well  []  Patient limited by fatigue  []  Patient limited by pain   []  Patient limited by medical complications  []  Other:     Patient Education:   [x]  HEP/Education Completed: Rev'd prior instructions with pt and progressed all kegel endur to 7 sec holds. Instructed pt to cont to progress by 1 sec per wk to a max of 10 sec as long as the quality discussed in clinic is present. Instructed urge control strategies with HO given. Also instructed LTR which felt good to pt's L LB. Cont to encourage pt to work at tapering protection as approp. Reminded fxn'l use of pelvic brace during strains. Progressed by instructing pelvic tilt options as listed. HO's given of new progressions and instructions along with checklist grid of home routine to this point. []  No new Education completed  [x]  Reviewed Prior HEP      [x]  Patient verbalized and/or demonstrated understanding of education provided. []  Patient unable to verbalize and/or demonstrate understanding of education provided. Will continue education. [x]  Barriers to learning: none    Assessment: Pt's bladder control is cont to steadily improve. Void control stratigies are working as patient is now able to delay voids as needed with decreased frequency to every hour and half. Good home PT compliance, and today's new progressions did not increase LBP. Discussed reduction in protection from guards to shield as appropriate with patient in agreements.    Body Structures/Functions/Activity Limitations: Recent RALP which resulted with urinary incontinence and lack of bladder control, PFM weakness with decreased localization and endurance, Decreased awareness of functional factors that increase pelvic organ strain, Decreased awareness of normal bladder habits, control, and diet effects on functioning, Abdominal and core weakness with abdominal obesity, Impaired endurance, Impaired motor control and Impaired strength  Prognosis: Good    GOALS:  Patient Goal: have normal bladder control with no leaking    Goal:  1 visit. Pt will be independent with basic HEP for PFM strengthening and localization of PFM and to fully understand post-op precautions and POC. MET    Short term goals: 6 weeks    1. The pt will demo the ability to independently identify normal bladder function, normal voiding patterns, diet impact on the bladder, and urge control strategies to promote continence and to provide pt insight into factors causing/perpetuating symptoms, and to avoid detrimental toileting habits. 2. The pt will demo a basic knowledge of pelvic floor viscera and musculature in order to promote insight into condition. 3.  The pt will demonstrate good PFM localization in order to maximize the efficacy of strengthening program.    4. The pt will report transition to guards (incontinence product) to indicate functional improvement in continence. 5. The pt will report 35 % improvement since starting therapy to indicate efficacy of treatment. 6. The pt will demonstrate independence with basic HEP designed to increase PFM strength, core strength, and hip girdle flexibility for maximal therapeutic outcome. Long term goals: 12 weeks  1. The pt will decrease urinary leakage by 80% since starting therapy to reduce risk of UTI, and to decrease need for incontinence products. 2. The pt will establish normal voiding patterns of daytime every 2 hrs or longer and 1 time nocturia to increase efficiency in his everyday life and to reduce pt frustration with condition.     3. The pt will decrease usage of incontinence care products to 2 guards or less per 24 hrs in order to reduce financial burden on the pt. 4. The pt will be independent with advanced HEP in order to maintain gains made in therapy following discharge. 5. IIQ and RON scores improved to 5 or less to indicate functional improvement with therapy intervention. 6. This pt will increase strength of the pelvic floor to 3/10/10/10 to increase continence and to aid in maintaining results. PLAN:  Treatment Recommendations: Strengthening, Functional Mobility Training, Transfer Training, Endurance Training, Neuromuscular Re-education, Manual Therapy - Soft Tissue Mobilization, Pain Management, Home Exercise Program, Patient Education and Self-Care Education and Training    [x]  Plan of care initiated. Plan to see patient 1 time pre op f/b 1 time every 2 weeks post op for 12 weeks post op to address the treatment planned outlined above.   []  Continue with current plan of care  [x]  Modify plan of care as follows: resume PT post op 1 time every 2 wks for 5-6 visits to achieve normal bladder control with minimal incont  []  Hold pending physician visit  []  Discharge    Time In 1600   Time Out 16:38   Timed Code Minutes: 38 min   Total Treatment Time: 38 min       Electronically Signed by: Funmilyao Bedolla, PT

## 2022-04-20 ENCOUNTER — HOSPITAL ENCOUNTER (OUTPATIENT)
Dept: PHYSICAL THERAPY | Age: 67
Setting detail: THERAPIES SERIES
Discharge: HOME | End: 2022-04-20
Payer: MEDICARE

## 2022-04-20 PROCEDURE — 97530 THERAPEUTIC ACTIVITIES: CPT | Performed by: PHYSICAL THERAPIST

## 2022-04-20 NOTE — PROGRESS NOTES
Pt is otherwise using pelvic brace for lifting and straining tasks with good success, but pt can still have some mild incont while at work. Pt still wears a Depends daytime because he has difficulty keeping the pad sticking to his underwear. Social/Functional History and Current Status:  Medications and Allergies have been reviewed and are listed on Medical History Questionnaire. Pt did is not on Flomax post op. Gil High lives with significant other in a single story home with a level surface to enter. Task Previous Current   ADLs  Independent Independent   IADL's Independent signif post op urinary incont and lack of bladder control   Ambulation Independent Has dribbling with walking   Transfers Independent Leaks with basic transfers   Recreation Independent likes to be active with house work and yard work   Community Integration Independent Independent   Driving Active  Active    Work [employer]. Occupation: [employer]--[employer] work, lift/push/pull/stairs  Full-Time.        PAIN -- pt denies consistent pain   Pt denies post op pain or hematuria    BLADDER ASSESSMENT [] Deferred secondary to:   Daily Fluid Ingestion: 48oz water, 1 Pepsi   Urination Frequency Times/Day: every 2 hrs or so  Times/Night: 1   Volume medium   Urge Sensation Normal  but gets strong as bladder gets more full   SYMPTOM QUESTIONNAIRE   Loses Urine Upon: Waiting too long, getting OOB   Incontinence Volume: small   Frequency of Leakage: occas   Wets the Bed: no   Burning/Pain with Urination: no   Difficulty Starting a Stream of Urine: no   Incomplete Emptying no   Strain to Empty Bladder: no   Falling Out Feeling: n/a   Urinate more than 7 times/day: yes   Use a form of Leakage Protection: 1 Depends for day; 1 guard for sleep   Restrict Fluid Intake: no   Stream Strength normal       BOWEL ASSESSMENT [] Deferred secondary to:   Frequency: Bid to every other day   Most Common Stool Consistency: soft SYMPTOM QUESTIONNAIRE   Strain to have a BM: no   Include fiber in your diet: yes: fruits   Take laxatives/enemas regularly: no   Pain with BM: no   Strong urge to have BM: no   Leak/Stain Feces: no   Diarrhea often: no         SEXUAL ASSESSMENT [] Deferred secondary to:   Sexually Active Has had ED for about 1 year     VITALS [] Deferred secondary to:   Height 6'0\"   Weight 265#       GENERAL ASSESSMENT   [] Deferred secondary to:   Palpation    Observation Abdominal incisions healing well   Posture Forward Head Posture and abdominal obesity   Range of Motion Lumbar and B LE AROM WFL and without c/o   Strength B LE strength WNL and without c/o; abdominal strength NT due to recent surgery   Gait WFL   Sensation WFL   Edema mild pitting edema B LE's   Balance/Fall History Denies balance or fall problems   Special Tests Neg SLR, ave hams flexi; Neg Homans           OBSERVATION  [] Deferred secondary to:   Patient Safety Patient offered a chaperone and declined.    Skin Condition intact   Urogenital Triangle No tenderness or tightness reported       PELVIC FLOOR EXTERNAL EXAM [] Deferred secondary to:   Exam perineal body   Sensation Intact   Muscle Localization Good minus but pt must concentrate   Palpation/Tone Normal   Pelvic Floor Strength PERF:Power: 1+to 2-,Endurance: 4,Reps: 10,Flicks: 10   Relax after Contraction Normal       TREATMENT   Precautions:     X in shaded column indicates activity completed today   Modalities Parameters/  Location  Notes                     Manual Therapy Time/Technique  Notes                     Exercise/Intervention    Notes   Basic pelvic anatomy, nature of condition, PT POC 2 min  x    Instruction on precise PFM localization 2 min  x    Post op restrictions, precautions, activity levels 2 min  x And how to progress at wk 5 post op   Urge control strategies 2 min  x    kegel-quick 1 sec 10 x Do one kegel option every 2 hrs in sitting or lying   kegel-hold 8 sec 10 x Gain 1 sec per wk to a max of 10 sec   TrA-quick 1 sec 10 x Stand up qd for any kegel   TrA-hold 8 sec 10 x    Pelvic brace-quick 1 sec 10 x And how to use the brace during fxn'l strains and transitions while exhaling   Pelvic brace-hold 8 sec 10 x    kegel with hip IR 5 sec 10 x Limit to 5 sec to avoid pain of inf abdominal region   kegel with hip ER 5 sec 10 x           LTR 3 sec 10  Hold due to L knee pain   Pelvic tilt 5 sec 10 x bid   Pelvic tilt with UE  10 x    Pelvic tilt with LE  10 x    Pelvic tilt with opp 2 sets 5  hold   Pelvic tilt, ball squeeze, bridges, and kegel  10              Specific Interventions Next Treatment: progress program as per symptoms    Activity/Treatment Tolerance:  [x]  Patient tolerated treatment well  []  Patient limited by fatigue  []  Patient limited by pain   []  Patient limited by medical complications  []  Other:     Patient Education:   [x]  HEP/Education Completed: Rev'd prior instructions with pt and progressed all kegel endur to 8 sec holds, instructed standing as position option qd for any kegel. Pt is tender to palp of mid-line inf abdominal region, no skin changes. Had pt keep kegel with hip IR and ER at 5 sec as these increased pain bit. Encouraged pt to consider shield or no protection when. I advised trial other brands of pads to use at work all as a means to taper from Depends. Hold on LTR due to L knee pain. Reminded pt to progress kegel endur by 1 sec per wk to a max of 10 sec as long as the quality discussed in clinic is present. Pt has not been doing his pelvic tilt routine for at least a month. Worked with routine in clinic today, no pain with pelvic tilt, tilt with UE. Pt had the start of some mild inf abdominal pain with tilt with LE. Instructed pt to resume those act's but hold on tilt with opp and bridge to avoid increasing pain further. Instructed sushil stretch with HO given. Gave updated grid of home routine to this point.   []  No new Education completed  [x]  Reviewed Prior HEP      [x]  Patient verbalized and/or demonstrated understanding of education provided. []  Patient unable to verbalize and/or demonstrate understanding of education provided. Will continue education. [x]  Barriers to learning: none    Assessment: Pt's bladder control is still improving, but it has stagnated a bit due to new aches, pains, injuries resulting with some home PT compliance reduction. Pt also reverted back to using a Depends due to \"technical\" difficulties with using a pad at work. Refined pt's home routine and instructions, gave various options for tapering out of Depends. Instructed pt that all of his home routine should not cause a pain 5/10 or more of the abdominal region. Pt with good understanding of today's instructions. Plan to f/u with him in one month, anticipate 1-2 more visits to finalize program instructions. Body Structures/Functions/Activity Limitations: Recent RALP which resulted with urinary incontinence and lack of bladder control, PFM weakness with decreased localization and endurance, Decreased awareness of functional factors that increase pelvic organ strain, Decreased awareness of normal bladder habits, control, and diet effects on functioning, Abdominal and core weakness with abdominal obesity, Impaired endurance, Impaired motor control and Impaired strength  Prognosis: Good    GOALS:  Patient Goal: have normal bladder control with no leaking    Goal:  1 visit. Pt will be independent with basic HEP for PFM strengthening and localization of PFM and to fully understand post-op precautions and POC. MET    Short term goals: 6 weeks    1. The pt will demo the ability to independently identify normal bladder function, normal voiding patterns, diet impact on the bladder, and urge control strategies to promote continence and to provide pt insight into factors causing/perpetuating symptoms, and to avoid detrimental toileting habits.     2. The pt will demo a basic knowledge of pelvic floor viscera and musculature in order to promote insight into condition. 3.  The pt will demonstrate good PFM localization in order to maximize the efficacy of strengthening program.    4. The pt will report transition to guards (incontinence product) to indicate functional improvement in continence. 5. The pt will report 35 % improvement since starting therapy to indicate efficacy of treatment. 6. The pt will demonstrate independence with basic HEP designed to increase PFM strength, core strength, and hip girdle flexibility for maximal therapeutic outcome. Long term goals: 12 weeks  1. The pt will decrease urinary leakage by 80% since starting therapy to reduce risk of UTI, and to decrease need for incontinence products. 2. The pt will establish normal voiding patterns of daytime every 2 hrs or longer and 1 time nocturia to increase efficiency in his everyday life and to reduce pt frustration with condition. 3. The pt will decrease usage of incontinence care products to 2 guards or less per 24 hrs in order to reduce financial burden on the pt. 4. The pt will be independent with advanced HEP in order to maintain gains made in therapy following discharge. 5. IIQ and RON scores improved to 5 or less to indicate functional improvement with therapy intervention. 6. This pt will increase strength of the pelvic floor to 3/10/10/10 to increase continence and to aid in maintaining results. PLAN:  Treatment Recommendations: Strengthening, Functional Mobility Training, Transfer Training, Endurance Training, Neuromuscular Re-education, Manual Therapy - Soft Tissue Mobilization, Pain Management, Home Exercise Program, Patient Education and Self-Care Education and Training    [x]  Plan of care initiated. Plan to see patient 1 time pre op f/b 1 time every 2 weeks post op for 12 weeks post op to address the treatment planned outlined above.   []  Continue with current plan of care  [x]  Modify plan of care as follows: resume PT post op 1 time every 2 wks for 5-6 visits to achieve normal bladder control with minimal incont  []  Hold pending physician visit  []  Discharge    Time In 4:30   Time Out 5:15   Timed Code Minutes: 45 min   Total Treatment Time: 45 min       Electronically Signed by: ТАТЬЯНА SPRINGER

## 2022-04-22 ENCOUNTER — TELEPHONE (OUTPATIENT)
Dept: FAMILY MEDICINE CLINIC | Age: 67
End: 2022-04-22

## 2022-04-22 DIAGNOSIS — J01.90 ACUTE RHINOSINUSITIS: Primary | ICD-10-CM

## 2022-04-22 RX ORDER — DOXYCYCLINE HYCLATE 100 MG/1
100 CAPSULE ORAL 2 TIMES DAILY
Qty: 20 CAPSULE | Refills: 0 | Status: SHIPPED | OUTPATIENT
Start: 2022-04-22 | End: 2022-05-02

## 2022-04-22 RX ORDER — FLUTICASONE PROPIONATE 50 MCG
1 SPRAY, SUSPENSION (ML) NASAL DAILY
Qty: 16 G | Refills: 0 | Status: SHIPPED | OUTPATIENT
Start: 2022-04-22 | End: 2022-07-26

## 2022-04-22 RX ORDER — BENZONATATE 100 MG/1
CAPSULE ORAL
Qty: 60 CAPSULE | Refills: 0 | Status: SHIPPED | OUTPATIENT
Start: 2022-04-22 | End: 2022-05-02

## 2022-04-22 NOTE — TELEPHONE ENCOUNTER
F/u if no better     Diagnosis Orders   1.  Acute rhinosinusitis  doxycycline hyclate (VIBRAMYCIN) 100 MG capsule    benzonatate (TESSALON PERLES) 100 MG capsule    fluticasone (FLONASE) 50 MCG/ACT nasal spray

## 2022-04-22 NOTE — TELEPHONE ENCOUNTER
----- Message from Amy Valencia sent at 4/21/2022  4:57 PM EDT -----  Subject: Message to Provider    QUESTIONS  Information for Provider? pt has cough congestion stuffy nose would like   to know if a medication can be called in for him   ---------------------------------------------------------------------------  --------------  CALL BACK INFO  What is the best way for the office to contact you? OK to leave message on   voicemail  Preferred Call Back Phone Number? [phone]  ---------------------------------------------------------------------------  --------------  SCRIPT ANSWERS  Relationship to Patient?  Self

## 2022-04-22 NOTE — TELEPHONE ENCOUNTER
When did symptoms start? X 1 week  Fever? NO  SOB? NO  Body aches? NO  Loss of taste/smell?  NO     Cough is very productive

## 2022-04-25 ENCOUNTER — OFFICE VISIT (OUTPATIENT)
Dept: UROLOGY | Age: 67
End: 2022-04-25
Payer: MEDICARE

## 2022-04-25 VITALS
SYSTOLIC BLOOD PRESSURE: 122 MMHG | DIASTOLIC BLOOD PRESSURE: 82 MMHG | WEIGHT: 280 LBS | HEIGHT: 72 IN | BODY MASS INDEX: 37.93 KG/M2

## 2022-04-25 DIAGNOSIS — R33.9 INCOMPLETE BLADDER EMPTYING: ICD-10-CM

## 2022-04-25 DIAGNOSIS — N39.3 STRESS INCONTINENCE: ICD-10-CM

## 2022-04-25 DIAGNOSIS — N52.31 ERECTILE DYSFUNCTION AFTER RADICAL PROSTATECTOMY: ICD-10-CM

## 2022-04-25 DIAGNOSIS — C61 PROSTATE CANCER (HCC): Primary | ICD-10-CM

## 2022-04-25 DIAGNOSIS — N40.1 BENIGN LOCALIZED PROSTATIC HYPERPLASIA WITH LOWER URINARY TRACT SYMPTOMS (LUTS): ICD-10-CM

## 2022-04-25 PROCEDURE — 4040F PNEUMOC VAC/ADMIN/RCVD: CPT | Performed by: UROLOGY

## 2022-04-25 PROCEDURE — 1123F ACP DISCUSS/DSCN MKR DOCD: CPT | Performed by: UROLOGY

## 2022-04-25 PROCEDURE — 1036F TOBACCO NON-USER: CPT | Performed by: UROLOGY

## 2022-04-25 PROCEDURE — 3017F COLORECTAL CA SCREEN DOC REV: CPT | Performed by: UROLOGY

## 2022-04-25 PROCEDURE — G8417 CALC BMI ABV UP PARAM F/U: HCPCS | Performed by: UROLOGY

## 2022-04-25 PROCEDURE — G8427 DOCREV CUR MEDS BY ELIG CLIN: HCPCS | Performed by: UROLOGY

## 2022-04-25 PROCEDURE — 99213 OFFICE O/P EST LOW 20 MIN: CPT | Performed by: UROLOGY

## 2022-04-25 NOTE — PROGRESS NOTES
MD MD [facility] Consultation / New Patient Visit    Patient:  Mendel Haggis  YOB: 1955  Date: 4/25/2022  Consult requested from Moraima Harden DO     HISTORY OF PRESENT ILLNESS:   The patient is a 74 y.o. male who presents today for follow-up for the following problem(s): Prostate cancer, HARPREET, and ED  Overall the problem(s) : are worsening. Associated Symptoms: No dysuria, gross hematuria. Pain Severity:      Today visit:   4/25/22  Presents with history of prostate cancer (G7 = 3+4), margins negative and PSA is undetectable. HARPREET after prostatectomy: 2-3 PPD  ED - impotent, on sildenafil. Summary of old records:   (Patient's old records, notes and chart reviewed and summarized above.)    Last several PSA's:  Lab Results   Component Value Date    PSA <0.02 02/07/2022    PSA 9.58 (H) 08/18/2021    PSA 9.24 (H) 07/13/2021       Last total testosterone:  No results found for: TESTOSTERONE    Urinalysis today:  No results found for this visit on 04/25/22. Last BUN and creatinine:  Lab Results   Component Value Date    BUN 18 12/03/2021     Lab Results   Component Value Date    CREATININE 0.9 12/03/2021       Imaging Reviewed during this Office Visit:   (results were independently reviewed by physician and radiology report verified)    PAST MEDICAL, FAMILY AND SOCIAL HISTORY:  Past Medical History:   Diagnosis Date    Cancer [facility])     prostate    Hypertension, essential     Obesity (BMI 30-39. 9)     Psoriasis      Past Surgical History:   Procedure Laterality Date    KNEE ARTHROSCOPY      right    PROSTATE BIOPSY  2021    PROSTATECTOMY Bilateral 12/2/2021    ROBOTIC ASSISTED LAPAROSCOPIC RADICAL PROSTECTOMY WITH BILATERAL LYMPH NODE DISSECTION performed by Jazzy Ba MD at [facility]       Family History   Problem Relation Age of Onset    Heart Disease Father     Colon Cancer Neg Hx     Prostate Cancer Neg Hx Outpatient Medications Marked as Taking for the 4/25/22 encounter (Office Visit) with Leroy Abreu MD   Medication Sig Dispense Refill    doxycycline hyclate (VIBRAMYCIN) 100 MG capsule Take 1 capsule by mouth 2 times daily for 10 days 20 capsule 0    benzonatate (TESSALON PERLES) 100 MG capsule Take 1 to 2 tablets by mouth every 8 hours as needed for cough. 60 capsule 0    fluticasone (FLONASE) 50 MCG/ACT nasal spray 1 spray by Nasal route daily for 14 days 16 g 0    sildenafil (VIAGRA) 25 MG tablet Take 1 tablet by mouth as needed for Erectile Dysfunction 30 tablet 5    lisinopril (PRINIVIL;ZESTRIL) 30 MG tablet Take 1 tablet by mouth daily 90 tablet 3    amLODIPine (NORVASC) 10 MG tablet Take 1 tablet by mouth daily 90 tablet 3       Patient has no known allergies. Social History     Tobacco Use   Smoking Status Never Smoker   Smokeless Tobacco Never Used       Social History     Substance and Sexual Activity   Alcohol Use No       REVIEW OF SYSTEMS:  Constitutional: negative  Eyes: negative  Respiratory: negative  Cardiovascular: negative  Gastrointestinal: negative  Musculoskeletal: negative  Genitourinary: negative  Skin: negative   Neurological: negative  Hematological/Lymphatic: negative  Psychological: negative    Physical Exam:    This a 74 y.o. male   Vitals:    04/25/22 1003   BP: 122/82     Constitutional: Patient in no acute distress   Neuro: alert and oriented to person place and time. Psych: Mood and affect normal.  Head: atraumatic normocephalic  Eyes: EOMi  HEENT: neck supple, trachea midline  Lungs: Respiratory effort normal  Cardiovascular:  Normal peripheral pulses  Abdomen: Soft, non-tender, non-distended, No CVA  Bladder: non-tender and not distended. FROMx4, no cyanosis clubbing edema  Skin: warm and dry      Assessment and Plan      1. Prostate cancer ([address]    2. Incomplete bladder emptying    3.  Benign localized prostatic hyperplasia with lower urinary tract symptoms (LUTS)    4. Stress incontinence    5. Erectile dysfunction after radical prostatectomy           Plan:      No follow-ups on file. PSA reviewed - undetectable. HARPREET - continue PFPT, discussed male sling if needed  ED - sildenafil, offered penile pump. Discussed IPP.     SANJAY Dianat 3 months with PSA

## 2022-07-26 ENCOUNTER — OFFICE VISIT (OUTPATIENT)
Dept: UROLOGY | Age: 67
End: 2022-07-26
Payer: MEDICARE

## 2022-07-26 ENCOUNTER — NURSE ONLY (OUTPATIENT)
Dept: LAB | Age: 67
End: 2022-07-26

## 2022-07-26 VITALS
DIASTOLIC BLOOD PRESSURE: 82 MMHG | SYSTOLIC BLOOD PRESSURE: 144 MMHG | WEIGHT: 281 LBS | HEIGHT: 72 IN | BODY MASS INDEX: 38.06 KG/M2

## 2022-07-26 DIAGNOSIS — C61 PROSTATE CANCER (HCC): Primary | ICD-10-CM

## 2022-07-26 DIAGNOSIS — C61 PROSTATE CANCER (HCC): ICD-10-CM

## 2022-07-26 DIAGNOSIS — N39.3 STRESS INCONTINENCE: ICD-10-CM

## 2022-07-26 DIAGNOSIS — N52.31 ERECTILE DYSFUNCTION AFTER RADICAL PROSTATECTOMY: ICD-10-CM

## 2022-07-26 LAB — PROSTATE SPECIFIC ANTIGEN: < 0.02 NG/ML (ref 0–1)

## 2022-07-26 PROCEDURE — G8427 DOCREV CUR MEDS BY ELIG CLIN: HCPCS | Performed by: UROLOGY

## 2022-07-26 PROCEDURE — 1123F ACP DISCUSS/DSCN MKR DOCD: CPT | Performed by: UROLOGY

## 2022-07-26 PROCEDURE — G8417 CALC BMI ABV UP PARAM F/U: HCPCS | Performed by: UROLOGY

## 2022-07-26 PROCEDURE — 99214 OFFICE O/P EST MOD 30 MIN: CPT | Performed by: UROLOGY

## 2022-07-26 PROCEDURE — 1036F TOBACCO NON-USER: CPT | Performed by: UROLOGY

## 2022-07-26 PROCEDURE — 3017F COLORECTAL CA SCREEN DOC REV: CPT | Performed by: UROLOGY

## 2022-07-26 NOTE — PROGRESS NOTES
[facility] [address] [place] [address]  [address]  [facility] [address]  Dept: [phone]  Dept Fax: [phone]  Loc: [address] [facility] Urology Office Note -     Patient:  Hany Martin  YOB: 1955    The patient is a 69 y.o. male who presents today for evaluation of the following problems:   Chief Complaint   Patient presents with    Prostate Cancer     Psa not done prior         History of Present Illness:    Prostate cancer  Needs PSA    FINAL DIAGNOSIS:   Prostate and bilateral pelvic lymph nodes, radical prostatectomy and   bilateral pelvic lymphadenectomy:             Prostatic adenocarcinoma, acinar type, Kelly score 3+4 = 7   (grade group 2 of 5). Carcinoma involves approximately 10% of the submitted tissue and is   confined to       the prostate. Negative for extraprostatic extension, bladder neck invasion,   seminal vesicle       invasion, and margin involvement. Six lymph nodes with no evidence of malignancy.  (0/6)       Nodular prostatic hyperplasia with mixed inflammation. pT2, pN0       HARPREET  stable  Wears one pad    ED  Still not at goal      Requested/reviewed records from Dale Herrera DO office and/or outside physician/EMR    (Patient's old records have been requested, reviewed and pertinent findings summarized in today's note.)    Procedures Today: N/A    Last several PSA's:  Lab Results   Component Value Date    PSA <0.02 02/07/2022    PSA 9.58 (H) 08/18/2021    PSA 9.24 (H) 07/13/2021       Last total testosterone:  No results found for: TESTOSTERONE    Urinalysis today:  No results found for this visit on 07/26/22.     Last BUN and creatinine:  Lab Results   Component Value Date    BUN 18 12/03/2021     Lab Results   Component Value Date    CREATININE 0.9 12/03/2021       Imaging Reviewed during this Office Visit:   Sherman Hunt MD independently reviewed the images and Plan:      Prostate cancer- needs PSA today and in six months  ED- sildenafil 25 mg making pt light headed. Takes it on occasion. Discussed trimix  HARPREET- stable          Prescriptions Ordered:  No orders of the defined types were placed in this encounter. Orders Placed:  No orders of the defined types were placed in this encounter.            Sania Valenica MD

## 2022-09-05 ENCOUNTER — HOSPITAL ENCOUNTER (EMERGENCY)
Age: 67
Discharge: HOME | End: 2022-09-05
Payer: MEDICARE

## 2022-09-05 VITALS
RESPIRATION RATE: 16 BRPM | TEMPERATURE: 98.7 F | BODY MASS INDEX: 38.65 KG/M2 | OXYGEN SATURATION: 94 % | SYSTOLIC BLOOD PRESSURE: 128 MMHG | HEART RATE: 97 BPM | DIASTOLIC BLOOD PRESSURE: 67 MMHG | WEIGHT: 285 LBS

## 2022-09-05 DIAGNOSIS — U07.1 COVID-19 VIRUS INFECTION: Primary | ICD-10-CM

## 2022-09-05 LAB — SARS-COV-2, NAA: DETECTED

## 2022-09-05 PROCEDURE — 99213 OFFICE O/P EST LOW 20 MIN: CPT

## 2022-09-05 PROCEDURE — 6360000002 HC RX W HCPCS: Performed by: NURSE PRACTITIONER

## 2022-09-05 PROCEDURE — 96372 THER/PROPH/DIAG INJ SC/IM: CPT

## 2022-09-05 PROCEDURE — 99213 OFFICE O/P EST LOW 20 MIN: CPT | Performed by: NURSE PRACTITIONER

## 2022-09-05 PROCEDURE — 87635 SARS-COV-2 COVID-19 AMP PRB: CPT

## 2022-09-05 RX ORDER — KETOROLAC TROMETHAMINE 30 MG/ML
30 INJECTION, SOLUTION INTRAMUSCULAR; INTRAVENOUS ONCE
Status: COMPLETED | OUTPATIENT
Start: 2022-09-05 | End: 2022-09-05

## 2022-09-05 RX ORDER — ALBUTEROL SULFATE 90 UG/1
2 AEROSOL, METERED RESPIRATORY (INHALATION) EVERY 4 HOURS PRN
Qty: 18 G | Refills: 0 | Status: SHIPPED | OUTPATIENT
Start: 2022-09-05

## 2022-09-05 RX ADMIN — KETOROLAC TROMETHAMINE 30 MG: 30 INJECTION, SOLUTION INTRAMUSCULAR; INTRAVENOUS at 14:47

## 2022-09-05 ASSESSMENT — PAIN DESCRIPTION - PAIN TYPE: TYPE: ACUTE PAIN

## 2022-09-05 ASSESSMENT — ENCOUNTER SYMPTOMS
RHINORRHEA: 1
VOMITING: 0
NAUSEA: 0
COUGH: 1
SHORTNESS OF BREATH: 0
DIARRHEA: 0
SORE THROAT: 0

## 2022-09-05 ASSESSMENT — PAIN DESCRIPTION - LOCATION
LOCATION: HEAD
LOCATION: HEAD

## 2022-09-05 ASSESSMENT — PAIN SCALES - GENERAL
PAINLEVEL_OUTOF10: 7
PAINLEVEL_OUTOF10: 7

## 2022-09-05 ASSESSMENT — PAIN - FUNCTIONAL ASSESSMENT
PAIN_FUNCTIONAL_ASSESSMENT: ACTIVITIES ARE NOT PREVENTED
PAIN_FUNCTIONAL_ASSESSMENT: 0-10

## 2022-09-05 ASSESSMENT — PAIN DESCRIPTION - DESCRIPTORS: DESCRIPTORS: ACHING

## 2022-09-05 ASSESSMENT — PAIN DESCRIPTION - FREQUENCY: FREQUENCY: CONTINUOUS

## 2022-09-05 NOTE — Clinical Note
Cha Leong was seen and treated in our emergency department on 9/5/2022. He may return to work on 09/10/2022. He must wear a mask or face covering while in public until 6/50/1349. If you have any questions or concerns, please don't hesitate to call.       Avery Torres, APRN - CNP

## 2022-09-05 NOTE — ED PROVIDER NOTES
General acute hospital  Urgent Care Encounter       CHIEF COMPLAINT       Chief Complaint   Patient presents with    Cough    Headache    Pharyngitis    Generalized Body Aches    Concern For COVID-19       Nurses Notes reviewed and I agree except as noted in the HPI. HISTORY OF PRESENT ILLNESS   Mayo Liu is a 69 y.o. male who presents for evaluation of congestion, mild cough, runny nose, body ache and headache that of been ongoing since yesterday and have continued into today. Patient states that there have been multiple people where he works that of tested positive for [place] recently. He denies any recorded fevers or shortness of breath. States that he does feel as though his chest may be little tight from time to time. The history is provided by the patient. REVIEW OF SYSTEMS     Review of Systems   Constitutional:  Negative for chills and fever. HENT:  Positive for congestion and rhinorrhea. Negative for sore throat. Respiratory:  Positive for cough. Negative for shortness of breath. Cardiovascular:  Negative for chest pain. Gastrointestinal:  Negative for diarrhea, nausea and vomiting. Musculoskeletal:  Negative for arthralgias and myalgias. Skin:  Negative for rash. Neurological:  Positive for headaches. PAST MEDICAL HISTORY         Diagnosis Date    Cancer [facility])     prostate    Hypertension, essential     Obesity (BMI 30-39. 9)     Psoriasis        SURGICALHISTORY     Patient  has a past surgical history that includes Knee arthroscopy; Ulnar tunnel release; Prostate biopsy (2021); and Prostatectomy (Bilateral, 12/2/2021).     CURRENT MEDICATIONS       Previous Medications    AMLODIPINE (NORVASC) 10 MG TABLET    Take 1 tablet by mouth daily    LISINOPRIL (PRINIVIL;ZESTRIL) 30 MG TABLET    Take 1 tablet by mouth daily    SILDENAFIL (VIAGRA) 25 MG TABLET    Take 1 tablet by mouth as needed for Erectile Dysfunction       ALLERGIES     Patient is has No Known Skin:     General: Skin is warm. Findings: No rash. Neurological:      Mental Status: He is alert and oriented to person, place, and time. Psychiatric:         Behavior: Behavior normal.       DIAGNOSTIC RESULTS     Labs:  Results for orders placed or performed during the hospital encounter of 09/05/22   COVID-19, Rapid   Result Value Ref Range    SARS-CoV-2, WILBERTO DETECTED (AA) NOT DETECTED       IMAGING:    No orders to display         EKG:      URGENT CARE COURSE:     Vitals:    09/05/22 1410   BP: 135/70   Pulse: 95   Resp: 16   Temp: 98.7 °F (37.1 °C)   TempSrc: Temporal   SpO2: 95%   Weight: 285 lb (129.3 kg)       Medications   ketorolac (TORADOL) injection 30 mg (has no administration in time range)            PROCEDURES:  None    FINAL IMPRESSION      1. COVID-19 virus infection          DISPOSITION/ PLAN     Patient is positive for COVID at this time. He is given a prescription for an albuterol inhaler and advised use over-the-counter Tylenol or ibuprofen for headache. I did discuss the possibility of treating with Paxlovid due to the patient's positive COVID result, however he states that he would prefer symptomatic treatment at home. He is advised to present to the ER if symptoms worsen and follow-up with his PCP on an outpatient basis after quarantine. I did discuss the CDC guidelines for quarantine and patient verbalized understanding.       PATIENT REFERRED TO:  [address] / [facility] [address] [address]      DISCHARGE MEDICATIONS:  New Prescriptions    ALBUTEROL SULFATE HFA (PROVENTIL;VENTOLIN;PROAIR) 108 (90 BASE) MCG/ACT INHALER    Inhale 2 puffs into the lungs every 4 hours as needed for Wheezing or Shortness of Breath       Discontinued Medications    No medications on file       Current Discharge Medication List          JENNY Butler CNP    (Please note that portions of this note were completed with a voice recognition program. Efforts were made to edit the dictations but occasionally words are mis-transcribed.)          Brian Cortes, JENNY - LONNY  09/05/22 1643

## 2022-09-06 ENCOUNTER — TELEPHONE (OUTPATIENT)
Dept: FAMILY MEDICINE CLINIC | Age: 67
End: 2022-09-06

## 2022-09-06 ENCOUNTER — CARE COORDINATION (OUTPATIENT)
Dept: CARE COORDINATION | Age: 67
End: 2022-09-06

## 2022-09-06 NOTE — TELEPHONE ENCOUNTER
----- Message from Isahn Barksdale, DO sent at 9/6/2022  6:49 AM EDT -----  Please f/u with pt and see how they are doing with their covid symptoms  Let me know, thanks!

## 2022-09-06 NOTE — CARE COORDINATION
Patient contacted regarding recent visit for viral symptoms. Call within 2 business days of discharge: Yes     contacted the family by telephone to perform follow-up call. Verified name and  with family as identifiers. Provided introduction to self, and reason for call due to viral symptoms of infection and/or exposure to COVID-19. Discussed COVID-19 related testing which was available at this time. Test results were positive. Patient informed of results, if available? Yes. Patient presented to emergency department/flu clinic with complaints of viral symptoms/exposure to COVID. Patient reports symptoms are the same. Due to no new or worsening symptoms the RN CTN/ACM was not notified for escalation. This author reviewed discharge instructions, medical action plan and red flags such as increased shortness of breath, increasing fever, worsening cough or chest pain with family who verbalized understanding. Discussed exposure protocols and quarantine with CDC Guidelines What To Do If You Are Sick    Family was given an opportunity for questions and concerns. The family agrees to contact their healthcare provider for questions related to their healthcare. Author provided contact information for future reference.

## 2022-09-19 ENCOUNTER — CARE COORDINATION (OUTPATIENT)
Dept: CARE COORDINATION | Age: 67
End: 2022-09-19

## 2022-09-19 NOTE — CARE COORDINATION
Attempted to reach patient for ED follow up in regards to COVID-19 education/ monitoring. Patient was unavailable at the time of my call, and a generic voicemail message was left asking patient to return my call at [phone].

## 2022-09-27 DIAGNOSIS — I10 HYPERTENSION, UNSPECIFIED TYPE: ICD-10-CM

## 2022-09-28 RX ORDER — AMLODIPINE BESYLATE 10 MG/1
10 TABLET ORAL DAILY
Qty: 90 TABLET | Refills: 3 | Status: SHIPPED | OUTPATIENT
Start: 2022-09-28

## 2022-11-21 ENCOUNTER — HOSPITAL ENCOUNTER (EMERGENCY)
Age: 67
Discharge: HOME | End: 2022-11-21
Payer: MEDICARE

## 2022-11-21 VITALS
SYSTOLIC BLOOD PRESSURE: 130 MMHG | HEIGHT: 72 IN | RESPIRATION RATE: 18 BRPM | TEMPERATURE: 99.3 F | OXYGEN SATURATION: 94 % | HEART RATE: 92 BPM | DIASTOLIC BLOOD PRESSURE: 83 MMHG | BODY MASS INDEX: 38.6 KG/M2 | WEIGHT: 285 LBS

## 2022-11-21 DIAGNOSIS — J03.80 ACUTE VIRAL TONSILLITIS: Primary | ICD-10-CM

## 2022-11-21 DIAGNOSIS — B97.89 ACUTE VIRAL TONSILLITIS: Primary | ICD-10-CM

## 2022-11-21 DIAGNOSIS — R11.0 NAUSEA: ICD-10-CM

## 2022-11-21 LAB
GROUP A STREP CULTURE, REFLEX: NEGATIVE
REFLEX THROAT C + S: NORMAL

## 2022-11-21 PROCEDURE — 87880 STREP A ASSAY W/OPTIC: CPT

## 2022-11-21 PROCEDURE — 99213 OFFICE O/P EST LOW 20 MIN: CPT

## 2022-11-21 PROCEDURE — 99213 OFFICE O/P EST LOW 20 MIN: CPT | Performed by: NURSE PRACTITIONER

## 2022-11-21 PROCEDURE — 87070 CULTURE OTHR SPECIMN AEROBIC: CPT

## 2022-11-21 RX ORDER — ONDANSETRON 4 MG/1
4 TABLET, ORALLY DISINTEGRATING ORAL EVERY 8 HOURS PRN
Qty: 15 TABLET | Refills: 0 | Status: SHIPPED | OUTPATIENT
Start: 2022-11-21

## 2022-11-21 ASSESSMENT — ENCOUNTER SYMPTOMS
NAUSEA: 1
COUGH: 0
ABDOMINAL PAIN: 1
EYE DISCHARGE: 0
EYE REDNESS: 0
SORE THROAT: 1
DIARRHEA: 0
SHORTNESS OF BREATH: 0
TROUBLE SWALLOWING: 0
VOMITING: 0
ABDOMINAL DISTENTION: 0
RHINORRHEA: 0

## 2022-11-21 ASSESSMENT — PAIN DESCRIPTION - FREQUENCY: FREQUENCY: CONTINUOUS

## 2022-11-21 ASSESSMENT — PAIN SCALES - GENERAL: PAINLEVEL_OUTOF10: 7

## 2022-11-21 ASSESSMENT — PAIN DESCRIPTION - LOCATION: LOCATION: ABDOMEN

## 2022-11-21 ASSESSMENT — PAIN - FUNCTIONAL ASSESSMENT: PAIN_FUNCTIONAL_ASSESSMENT: 0-10

## 2022-11-21 NOTE — Clinical Note
Courtney Monteiro was seen and treated in our emergency department on 11/21/2022. He may return to work on 11/23/2022. If you have any questions or concerns, please don't hesitate to call.       Tommy Albarado, JENNY - CNP

## 2022-11-21 NOTE — ED PROVIDER NOTES
[facility]  Urgent Care Encounter      CHIEF COMPLAINT       Chief Complaint   Patient presents with    Abdominal Pain    Headache    Nausea       Nurses Notes reviewed and I agree except as noted in the HPI. HISTORY OF PRESENT ILLNESS   Martha Sebastian is a 69 y.o. male who presents for evaluation of sore throat. Onset of symptoms over the past 2 to 3 days, unchanged. Sore throat is aching, continuous. Rates it 7/10. Associated nausea, generalized headache, abdominal cramping. No vomiting or diarrhea. No travel. No known exposure to COVID, strep, flu. No improvement with current treatment. REVIEW OF SYSTEMS     Review of Systems   Constitutional:  Positive for fatigue and fever. Negative for chills and diaphoresis. HENT:  Positive for sore throat. Negative for congestion, ear pain, rhinorrhea and trouble swallowing. Eyes:  Negative for discharge and redness. Respiratory:  Negative for cough and shortness of breath. Cardiovascular:  Negative for chest pain. Gastrointestinal:  Positive for abdominal pain and nausea. Negative for abdominal distention, diarrhea and vomiting. Genitourinary:  Negative for decreased urine volume. Musculoskeletal:  Positive for myalgias. Negative for neck pain and neck stiffness. Skin:  Negative for rash. Neurological:  Positive for headaches. Negative for dizziness. Hematological:  Negative for adenopathy. Psychiatric/Behavioral:  Negative for sleep disturbance. PAST MEDICAL HISTORY         Diagnosis Date    Cancer [facility])     prostate    Hypertension, essential     Obesity (BMI 30-39. 9)     Psoriasis        SURGICAL HISTORY     Patient  has a past surgical history that includes Knee arthroscopy; Ulnar tunnel release; Prostate biopsy (2021); and Prostatectomy (Bilateral, 12/2/2021).     CURRENT MEDICATIONS       Discharge Medication List as of 11/21/2022 12:12 PM        CONTINUE these medications which have NOT CHANGED Details   amLODIPine (NORVASC) 10 MG tablet Take 1 tablet by mouth daily, Disp-90 tablet, R-3Normal      albuterol sulfate HFA (PROVENTIL;VENTOLIN;PROAIR) 108 (90 Base) MCG/ACT inhaler Inhale 2 puffs into the lungs every 4 hours as needed for Wheezing or Shortness of Breath, Disp-18 g, R-0Normal      sildenafil (VIAGRA) 25 MG tablet Take 1 tablet by mouth as needed for Erectile Dysfunction, Disp-30 tablet, R-5Print      lisinopril (PRINIVIL;ZESTRIL) 30 MG tablet Take 1 tablet by mouth daily, Disp-90 tablet, R-3Normal             ALLERGIES     Patient is has No Known Allergies. FAMILY HISTORY     Patient'sfamily history includes Heart Disease in his father. SOCIAL HISTORY     Patient  reports that he has never smoked. He has never used smokeless tobacco. He reports that he does not drink alcohol and does not use drugs. PHYSICAL EXAM     ED TRIAGE VITALS  BP: 130/83, Temp: 99.3 °F (37.4 °C), Heart Rate: 92, Resp: 18, SpO2: 94 %  Physical Exam  Vitals and nursing note reviewed. Constitutional:       General: He is not in acute distress. Appearance: Normal appearance. He is well-developed. He is not ill-appearing, toxic-appearing or diaphoretic. HENT:      Head: Normocephalic and atraumatic. Jaw: No trismus. Right Ear: Hearing, tympanic membrane, ear canal and external ear normal. No mastoid tenderness. No hemotympanum. Tympanic membrane is not perforated, erythematous or bulging. Left Ear: Hearing, tympanic membrane, ear canal and external ear normal. No mastoid tenderness. No hemotympanum. Tympanic membrane is not perforated, erythematous or bulging. Nose: Nose normal.      Mouth/Throat:      Mouth: Mucous membranes are moist.      Pharynx: Oropharynx is clear. Uvula midline. Posterior oropharyngeal erythema present. Tonsils: No tonsillar exudate or tonsillar abscesses. 1+ on the right. 1+ on the left. Eyes:      General: No scleral icterus.      Conjunctiva/sclera: Conjunctivae normal.   Neck:      Thyroid: No thyromegaly. Trachea: Trachea normal.   Cardiovascular:      Rate and Rhythm: Normal rate and regular rhythm. No extrasystoles are present. Chest Wall: PMI is not displaced. Heart sounds: Normal heart sounds. No murmur heard. No friction rub. No gallop. Pulmonary:      Effort: Pulmonary effort is normal. No accessory muscle usage or respiratory distress. Breath sounds: Normal breath sounds. Musculoskeletal:      Cervical back: Normal range of motion and neck supple. Lymphadenopathy:      Head:      Right side of head: No submental, submandibular, tonsillar, preauricular, posterior auricular or occipital adenopathy. Left side of head: No submental, submandibular, tonsillar, preauricular, posterior auricular or occipital adenopathy. Cervical: Cervical adenopathy present. Right cervical: Superficial cervical adenopathy present. Left cervical: Superficial cervical adenopathy present. Upper Body:      Right upper body: No supraclavicular adenopathy. Left upper body: No supraclavicular adenopathy. Comments: Bilateral superficial cervical adenopathy, less than 1 cm. Symmetrical, nontender. Skin:     General: Skin is warm and dry. Coloration: Skin is not pale. Findings: No rash. Comments: Skin intact, warm and dry to touch, no rashes noted on exposed surfaces. Neurological:      Mental Status: He is alert and oriented to person, place, and time. He is not disoriented. Psychiatric:         Mood and Affect: Mood normal.         Behavior: Behavior is cooperative.        DIAGNOSTIC RESULTS   Labs:  Results for orders placed or performed during the hospital encounter of 11/21/22   Strep A culture, throat   Result Value Ref Range    REFLEX THROAT C + S INDICATED    STREP A ANTIGEN   Result Value Ref Range    GROUP A STREP CULTURE, REFLEX Negative        IMAGING:  No orders to display      URGENT CARE COURSE:     Vitals:    11/21/22 1130   BP: 130/83   Pulse: 92   Resp: 18   Temp: 99.3 °F (37.4 °C)   TempSrc: Oral   SpO2: 94%   Weight: 285 lb (129.3 kg)   Height: 6' (1.829 m)       Medications - No data to display  PROCEDURES:  None  FINAL IMPRESSION      1. Acute viral tonsillitis    2. Nausea        DISPOSITION/PLAN   DISPOSITION Decision To Discharge 11/21/2022 12:11:08 PM    Nontoxic, no distress. Strep negative, defer treatment pending culture. Zofran as needed for nausea. Over-the-counter treatment as needed. Increase fluids, rest.  If any distress go to ER. PATIENT REFERRED TO:  [address]  [address] [address] [phone]      Follow-up as needed. Medication as prescribed, advance diet as tolerated. If any distress go to ER.   DISCHARGE MEDICATIONS:  Discharge Medication List as of 11/21/2022 12:12 PM        START taking these medications    Details   ondansetron (ZOFRAN ODT) 4 MG disintegrating tablet Take 1 tablet by mouth every 8 hours as needed for Nausea or Vomiting, Disp-15 tablet, R-0Normal           Discharge Medication List as of 11/21/2022 12:12 PM          [address] [facility], APRN - CNP  11/21/22 1257

## 2022-11-23 LAB — THROAT/NOSE CULTURE: NORMAL

## 2022-11-25 DIAGNOSIS — I10 HYPERTENSION, ESSENTIAL: Chronic | ICD-10-CM

## 2022-11-25 RX ORDER — LISINOPRIL 30 MG/1
30 TABLET ORAL DAILY
Qty: 90 TABLET | Refills: 3 | Status: SHIPPED | OUTPATIENT
Start: 2022-11-25

## 2022-11-25 NOTE — TELEPHONE ENCOUNTER
Recent Visits  Date Type Provider Dept   11/17/21 Office Visit Yasmeen Alvarado, DO [facility]   11/09/21 Office Visit Yasmeen Alvarado, DO [facility]   Showing recent visits within past 540 days with a meds authorizing provider and meeting all other requirements  Future Appointments  No visits were found meeting these conditions.   Showing future appointments within next 150 days with a meds authorizing provider and meeting all other requirements    Future Appointments   Date Time Provider Pramod Cristobal   1/24/2023  9:00 AM Yakov West, [address]

## 2023-01-05 ENCOUNTER — HOSPITAL ENCOUNTER (EMERGENCY)
Age: 68
Discharge: HOME | End: 2023-01-05
Payer: MEDICARE

## 2023-01-05 VITALS
OXYGEN SATURATION: 96 % | SYSTOLIC BLOOD PRESSURE: 143 MMHG | WEIGHT: 285 LBS | RESPIRATION RATE: 18 BRPM | TEMPERATURE: 98.3 F | DIASTOLIC BLOOD PRESSURE: 78 MMHG | BODY MASS INDEX: 38.65 KG/M2 | HEART RATE: 80 BPM

## 2023-01-05 DIAGNOSIS — J01.00 ACUTE NON-RECURRENT MAXILLARY SINUSITIS: Primary | ICD-10-CM

## 2023-01-05 DIAGNOSIS — H10.32 ACUTE CONJUNCTIVITIS OF LEFT EYE, UNSPECIFIED ACUTE CONJUNCTIVITIS TYPE: ICD-10-CM

## 2023-01-05 PROCEDURE — 99213 OFFICE O/P EST LOW 20 MIN: CPT | Performed by: NURSE PRACTITIONER

## 2023-01-05 PROCEDURE — 99213 OFFICE O/P EST LOW 20 MIN: CPT

## 2023-01-05 RX ORDER — GENTAMICIN SULFATE 3 MG/ML
1 SOLUTION/ DROPS OPHTHALMIC 4 TIMES DAILY
Qty: 1 EACH | Refills: 0 | Status: SHIPPED | OUTPATIENT
Start: 2023-01-05 | End: 2023-01-12

## 2023-01-05 RX ORDER — AMOXICILLIN AND CLAVULANATE POTASSIUM 875; 125 MG/1; MG/1
1 TABLET, FILM COATED ORAL 2 TIMES DAILY
Qty: 20 TABLET | Refills: 0 | Status: SHIPPED | OUTPATIENT
Start: 2023-01-05 | End: 2023-01-15

## 2023-01-05 ASSESSMENT — ENCOUNTER SYMPTOMS
SORE THROAT: 1
EYE REDNESS: 1
VOMITING: 0
NAUSEA: 0
EYE PAIN: 0
CHEST TIGHTNESS: 0
SINUS PRESSURE: 1
PHOTOPHOBIA: 0
DIARRHEA: 0
COUGH: 1
SHORTNESS OF BREATH: 0
TROUBLE SWALLOWING: 0
EYE DISCHARGE: 1
RHINORRHEA: 1
WHEEZING: 0

## 2023-01-05 ASSESSMENT — PAIN - FUNCTIONAL ASSESSMENT: PAIN_FUNCTIONAL_ASSESSMENT: NONE - DENIES PAIN

## 2023-01-05 NOTE — Clinical Note
Dimitris Williamson was seen and treated in our emergency department on 1/5/2023. He may return to work on 01/06/2023. If you have any questions or concerns, please don't hesitate to call.       Sukumar uCmmins, JENNY - CNP

## 2023-01-05 NOTE — Clinical Note
Zoraida Hart was seen and treated in our emergency department on 1/5/2023. He may return to work on . If you have any questions or concerns, please don't hesitate to call.       Ramu Stern, JENNY - CNP

## 2023-01-05 NOTE — ED PROVIDER NOTES
[facility]  Urgent Care Encounter      CHIEF COMPLAINT       Chief Complaint   Patient presents with    Head Congestion     Sinus pressure, sore throat      Eye Drainage     Left       Nurses Notes reviewed and I agree except as noted in the HPI. HISTORY OF PRESENT ILLNESS   Mayo Liu is a 69 y.o. male who presents for evaluation of cough and sinus problems. Onset of symptoms between 7 and 10 days ago, worsening. Cough is intermittent, productive at times. Associated sinus congestion/pressure, sore throat, left eye redness/drainage. Sinus congestion with sinus pressure. No worst headache of life. Sore throat with coughing/swallowing. No fever, trouble swallowing. No wheezing, chest pain, shortness of breath. Onset of left eye redness with a persistent watery drainage over the last 1 to 2 days. No visual disturbances. No known exposure to pinkeye. No improvement with current treatment. REVIEW OF SYSTEMS     Review of Systems   Constitutional:  Negative for chills, diaphoresis, fatigue and fever. HENT:  Positive for congestion, postnasal drip, rhinorrhea, sinus pressure and sore throat. Negative for ear pain and trouble swallowing. Eyes:  Positive for discharge and redness. Negative for photophobia and pain. Respiratory:  Positive for cough. Negative for chest tightness, shortness of breath and wheezing. Cardiovascular:  Negative for chest pain. Gastrointestinal:  Negative for diarrhea, nausea and vomiting. Genitourinary:  Negative for decreased urine volume. Musculoskeletal:  Negative for neck pain and neck stiffness. Skin:  Negative for rash. Neurological:  Negative for headaches. Hematological:  Negative for adenopathy. Psychiatric/Behavioral:  Negative for sleep disturbance. PAST MEDICAL HISTORY         Diagnosis Date    Cancer [facility])     prostate    Hypertension, essential     Obesity (BMI 30-39. 9)     Psoriasis        SURGICAL HISTORY     Patient  has a past surgical history that includes Knee arthroscopy; Ulnar tunnel release; Prostate biopsy (2021); and Prostatectomy (Bilateral, 12/2/2021). CURRENT MEDICATIONS       Previous Medications    ALBUTEROL SULFATE HFA (PROVENTIL;VENTOLIN;PROAIR) 108 (90 BASE) MCG/ACT INHALER    Inhale 2 puffs into the lungs every 4 hours as needed for Wheezing or Shortness of Breath    AMLODIPINE (NORVASC) 10 MG TABLET    Take 1 tablet by mouth daily    LISINOPRIL (PRINIVIL;ZESTRIL) 30 MG TABLET    Take 1 tablet by mouth daily    SILDENAFIL (VIAGRA) 25 MG TABLET    Take 1 tablet by mouth as needed for Erectile Dysfunction       ALLERGIES     Patient is has No Known Allergies. FAMILY HISTORY     Patient'sfamily history includes Heart Disease in his father. SOCIAL HISTORY     Patient  reports that he has never smoked. He has never used smokeless tobacco. He reports that he does not drink alcohol and does not use drugs. PHYSICAL EXAM     ED TRIAGE VITALS  BP: (!) 143/78, Temp: 98.3 °F (36.8 °C), Heart Rate: 80, Resp: 18, SpO2: 96 %  Physical Exam  Vitals and nursing note reviewed. Constitutional:       General: He is not in acute distress. Appearance: Normal appearance. He is well-developed. He is not ill-appearing, toxic-appearing or diaphoretic. HENT:      Head: Normocephalic and atraumatic. Jaw: No trismus. Right Ear: Hearing, tympanic membrane, ear canal and external ear normal. No mastoid tenderness. No hemotympanum. Tympanic membrane is not perforated, erythematous or bulging. Left Ear: Hearing, tympanic membrane, ear canal and external ear normal. No mastoid tenderness. No hemotympanum. Tympanic membrane is not perforated, erythematous or bulging. Nose: Congestion present. Right Sinus: Maxillary sinus tenderness present. Left Sinus: Maxillary sinus tenderness present.       Mouth/Throat:      Mouth: Mucous membranes are moist.      Pharynx: Oropharynx is clear. Uvula midline. Tonsils: No tonsillar abscesses. Eyes:      General: No scleral icterus. Left eye: Discharge (watery) present. Extraocular Movements: Extraocular movements intact. Conjunctiva/sclera:      Left eye: Left conjunctiva is injected. Pupils: Pupils are equal, round, and reactive to light. Neck:      Thyroid: No thyromegaly. Trachea: Trachea normal.   Cardiovascular:      Rate and Rhythm: Normal rate and regular rhythm. No extrasystoles are present. Chest Wall: PMI is not displaced. Heart sounds: Normal heart sounds. No murmur heard. No friction rub. No gallop. Pulmonary:      Effort: Pulmonary effort is normal. No accessory muscle usage or respiratory distress. Breath sounds: Normal breath sounds. Musculoskeletal:      Cervical back: Normal range of motion and neck supple. Lymphadenopathy:      Head:      Right side of head: No submental, submandibular, tonsillar, preauricular, posterior auricular or occipital adenopathy. Left side of head: No submental, submandibular, tonsillar, preauricular, posterior auricular or occipital adenopathy. Cervical: No cervical adenopathy. Upper Body:      Right upper body: No supraclavicular adenopathy. Left upper body: No supraclavicular adenopathy. Skin:     General: Skin is warm and dry. Coloration: Skin is not pale. Findings: No rash. Comments: Skin intact, warm and dry to touch, no rashes noted on exposed surfaces. Neurological:      Mental Status: He is alert and oriented to person, place, and time. He is not disoriented. Psychiatric:         Mood and Affect: Mood normal.         Behavior: Behavior is cooperative. DIAGNOSTIC RESULTS   Labs:No results found for this visit on 01/05/23.     IMAGING:  No orders to display      URGENT CARE COURSE:     Vitals:    01/05/23 0835 01/05/23 0836   BP:  (!) 143/78   Pulse: 80    Resp: 18    Temp: 98.3 °F (36.8 °C) TempSrc: Temporal    SpO2: 96%    Weight: 285 lb (129.3 kg)        Medications - No data to display  PROCEDURES:  None  FINAL IMPRESSION      1. Acute non-recurrent maxillary sinusitis    2. Acute conjunctivitis of left eye, unspecified acute conjunctivitis type        DISPOSITION/PLAN   DISPOSITION Decision To Discharge 01/05/2023 08:57:28 AM    Nontoxic, no distress. Exam consistent with nasal or sinusitis, acute conjunctivitis. Medication as prescribed, continue over-the-counter medication. Increase fluids, rest.  If any distress go to ER. PATIENT REFERRED TO:  [address]  [address] [id number] [phone]      Follow-up as needed. Medication as prescribed. Continue over-the-counter medication. Increase fluids, rest.  If any distress go to ER.     DISCHARGE MEDICATIONS:  New Prescriptions    AMOXICILLIN-CLAVULANATE (AUGMENTIN) 875-125 MG PER TABLET    Take 1 tablet by mouth 2 times daily for 10 days    GENTAMICIN (GARAMYCIN) 0.3 % OPHTHALMIC SOLUTION    Place 1 drop into the left eye 4 times daily for 7 days     Current Discharge Medication List          JENNY Kaur CNP, APRN - CNP  01/05/23 8999

## 2023-01-05 NOTE — ED TRIAGE NOTES
Patient to room with c/o head congestion, sinus pressure, and productive cough beginning one week ago. C/o left eye redness, irritation, and clear drainage beginning yesterday.

## 2023-01-14 ENCOUNTER — HOSPITAL ENCOUNTER (EMERGENCY)
Age: 68
Discharge: HOME | End: 2023-01-14
Attending: STUDENT IN AN ORGANIZED HEALTH CARE EDUCATION/TRAINING PROGRAM
Payer: MEDICARE

## 2023-01-14 ENCOUNTER — APPOINTMENT (OUTPATIENT)
Dept: GENERAL RADIOLOGY | Age: 68
End: 2023-01-14
Payer: MEDICARE

## 2023-01-14 VITALS
DIASTOLIC BLOOD PRESSURE: 97 MMHG | HEART RATE: 81 BPM | SYSTOLIC BLOOD PRESSURE: 164 MMHG | BODY MASS INDEX: 38.65 KG/M2 | WEIGHT: 285 LBS | OXYGEN SATURATION: 94 % | TEMPERATURE: 98.4 F | RESPIRATION RATE: 20 BRPM

## 2023-01-14 DIAGNOSIS — J18.9 PNEUMONIA OF RIGHT LOWER LOBE DUE TO INFECTIOUS ORGANISM: ICD-10-CM

## 2023-01-14 DIAGNOSIS — J32.0 CHRONIC MAXILLARY SINUSITIS: Primary | ICD-10-CM

## 2023-01-14 DIAGNOSIS — L30.9 ECZEMA, UNSPECIFIED TYPE: ICD-10-CM

## 2023-01-14 LAB
ANION GAP SERPL CALCULATED.3IONS-SCNC: 10 MEQ/L (ref 8–16)
BASOPHILS # BLD: 1 %
BASOPHILS ABSOLUTE: 0.1 THOU/MM3 (ref 0–0.1)
BUN BLDV-MCNC: 12 MG/DL (ref 7–22)
CALCIUM SERPL-MCNC: 9.1 MG/DL (ref 8.5–10.5)
CHLORIDE BLD-SCNC: 104 MEQ/L (ref 98–111)
CO2: 25 MEQ/L (ref 23–33)
CREAT SERPL-MCNC: 0.7 MG/DL (ref 0.4–1.2)
EOSINOPHIL # BLD: 3.1 %
EOSINOPHILS ABSOLUTE: 0.2 THOU/MM3 (ref 0–0.4)
ERYTHROCYTE [DISTWIDTH] IN BLOOD BY AUTOMATED COUNT: 14.9 % (ref 11.5–14.5)
ERYTHROCYTE [DISTWIDTH] IN BLOOD BY AUTOMATED COUNT: 47.6 FL (ref 35–45)
GFR SERPL CREATININE-BSD FRML MDRD: > 60 ML/MIN/1.73M2
GLUCOSE BLD-MCNC: 101 MG/DL (ref 70–108)
HCT VFR BLD CALC: 42.4 % (ref 42–52)
HEMOGLOBIN: 14 GM/DL (ref 14–18)
IMMATURE GRANS (ABS): 0.04 THOU/MM3 (ref 0–0.07)
IMMATURE GRANULOCYTES: 0.7 %
INFLUENZA A: NOT DETECTED
INFLUENZA B: NOT DETECTED
LYMPHOCYTES # BLD: 20.8 %
LYMPHOCYTES ABSOLUTE: 1.2 THOU/MM3 (ref 1–4.8)
MCH RBC QN AUTO: 29 PG (ref 26–33)
MCHC RBC AUTO-ENTMCNC: 33 GM/DL (ref 32.2–35.5)
MCV RBC AUTO: 87.8 FL (ref 80–94)
MONOCYTES # BLD: 11.1 %
MONOCYTES ABSOLUTE: 0.6 THOU/MM3 (ref 0.4–1.3)
NUCLEATED RED BLOOD CELLS: 0 /100 WBC
PLATELET # BLD: 274 THOU/MM3 (ref 130–400)
PMV BLD AUTO: 9.9 FL (ref 9.4–12.4)
POTASSIUM SERPL-SCNC: 4.1 MEQ/L (ref 3.5–5.2)
RBC # BLD: 4.83 MILL/MM3 (ref 4.7–6.1)
SARS-COV-2 RNA, RT PCR: NOT DETECTED
SEG NEUTROPHILS: 63.3 %
SEGMENTED NEUTROPHILS ABSOLUTE COUNT: 3.7 THOU/MM3 (ref 1.8–7.7)
SODIUM BLD-SCNC: 139 MEQ/L (ref 135–145)
TROPONIN T: < 0.01 NG/ML
WBC # BLD: 5.8 THOU/MM3 (ref 4.8–10.8)

## 2023-01-14 PROCEDURE — 87636 SARSCOV2 & INF A&B AMP PRB: CPT

## 2023-01-14 PROCEDURE — 93005 ELECTROCARDIOGRAM TRACING: CPT | Performed by: EMERGENCY MEDICINE

## 2023-01-14 PROCEDURE — 85025 COMPLETE CBC W/AUTO DIFF WBC: CPT

## 2023-01-14 PROCEDURE — 71045 X-RAY EXAM CHEST 1 VIEW: CPT

## 2023-01-14 PROCEDURE — 80048 BASIC METABOLIC PNL TOTAL CA: CPT

## 2023-01-14 PROCEDURE — 6370000000 HC RX 637 (ALT 250 FOR IP): Performed by: EMERGENCY MEDICINE

## 2023-01-14 PROCEDURE — 99285 EMERGENCY DEPT VISIT HI MDM: CPT

## 2023-01-14 PROCEDURE — 84484 ASSAY OF TROPONIN QUANT: CPT

## 2023-01-14 PROCEDURE — 36415 COLL VENOUS BLD VENIPUNCTURE: CPT

## 2023-01-14 RX ORDER — FLUTICASONE PROPIONATE 50 MCG
1 SPRAY, SUSPENSION (ML) NASAL ONCE
Status: COMPLETED | OUTPATIENT
Start: 2023-01-14 | End: 2023-01-14

## 2023-01-14 RX ORDER — DOXYCYCLINE HYCLATE 100 MG
100 TABLET ORAL 2 TIMES DAILY
Qty: 10 TABLET | Refills: 0 | Status: SHIPPED | OUTPATIENT
Start: 2023-01-14 | End: 2023-01-19

## 2023-01-14 RX ORDER — PREDNISONE 20 MG/1
20 TABLET ORAL 2 TIMES DAILY
Qty: 10 TABLET | Refills: 0 | Status: SHIPPED | OUTPATIENT
Start: 2023-01-14 | End: 2023-01-19

## 2023-01-14 RX ORDER — ASPIRIN 81 MG/1
324 TABLET, CHEWABLE ORAL ONCE
Status: COMPLETED | OUTPATIENT
Start: 2023-01-14 | End: 2023-01-14

## 2023-01-14 RX ORDER — FLUTICASONE PROPIONATE 50 MCG
1 SPRAY, SUSPENSION (ML) NASAL DAILY
Qty: 16 G | Refills: 0 | Status: SHIPPED | OUTPATIENT
Start: 2023-01-14

## 2023-01-14 RX ORDER — ALBUTEROL SULFATE 90 UG/1
2 AEROSOL, METERED RESPIRATORY (INHALATION) 4 TIMES DAILY PRN
Qty: 18 G | Refills: 0 | Status: SHIPPED | OUTPATIENT
Start: 2023-01-14

## 2023-01-14 RX ORDER — CETIRIZINE HYDROCHLORIDE 5 MG/1
5 TABLET ORAL ONCE
Status: COMPLETED | OUTPATIENT
Start: 2023-01-14 | End: 2023-01-14

## 2023-01-14 RX ADMIN — FLUTICASONE PROPIONATE 1 SPRAY: 50 SPRAY, METERED NASAL at 19:02

## 2023-01-14 RX ADMIN — Medication 5 MG: at 19:02

## 2023-01-14 RX ADMIN — ASPIRIN 324 MG: 81 TABLET, CHEWABLE ORAL at 18:40

## 2023-01-14 ASSESSMENT — PAIN DESCRIPTION - ONSET: ONSET: ON-GOING

## 2023-01-14 ASSESSMENT — PAIN DESCRIPTION - PAIN TYPE: TYPE: ACUTE PAIN

## 2023-01-14 ASSESSMENT — PAIN DESCRIPTION - FREQUENCY: FREQUENCY: CONTINUOUS

## 2023-01-14 ASSESSMENT — PAIN SCALES - GENERAL: PAINLEVEL_OUTOF10: 4

## 2023-01-14 ASSESSMENT — PAIN DESCRIPTION - DESCRIPTORS: DESCRIPTORS: ACHING

## 2023-01-14 ASSESSMENT — PAIN - FUNCTIONAL ASSESSMENT: PAIN_FUNCTIONAL_ASSESSMENT: 0-10

## 2023-01-14 NOTE — ED PROVIDER NOTES
[facility] [address] [address] EMERGENCY DEPT      EMERGENCY MEDICINE     Pt Name: Mason Rodriguez  MRN: [id number]  [place] 1955  Date of evaluation: 1/14/2023  Provider: Shonna Saunders DO  Supervising Physician: Lizett Lindsey, [facility]       Chief Complaint   Patient presents with    Chest Pain     HISTORY OF PRESENT ILLNESS   Mason Rodriguez is a pleasant 69 y.o. male with a h/o HTN who presents to the emergency department from home for evaluation of sinus pain chest congestion x3 weeks. He has gone to urgent care twice, the most recent time they gave him antibiotics for sinusitis. At this time. Is also been having some chest tightness. Yesterday he started having rhinorrhea. Patient's cough is productive, clear. PASTMEDICAL HISTORY     Past Medical History:   Diagnosis Date    Cancer ([address]     prostate    Hypertension, essential     Obesity (BMI 30-39. 9)     Psoriasis        Patient Active Problem List   Diagnosis Code    Hypertension, essential I10    Obesity (BMI 30-39. 9) E66.9    Psoriasis L40.9    Elevated PSA, less than 10 ng/ml R97.20    Prostate cancer ([facility] [address] C61     SURGICAL HISTORY       Past Surgical History:   Procedure Laterality Date    KNEE ARTHROSCOPY      right    PROSTATE BIOPSY  2021    PROSTATECTOMY Bilateral 12/2/2021    ROBOTIC ASSISTED LAPAROSCOPIC RADICAL PROSTECTOMY WITH BILATERAL LYMPH NODE DISSECTION performed by Geovanna Cuellar MD at [place] [address]       Previous Medications    ALBUTEROL SULFATE HFA (PROVENTIL;VENTOLIN;PROAIR) 108 (90 BASE) MCG/ACT INHALER    Inhale 2 puffs into the lungs every 4 hours as needed for Wheezing or Shortness of Breath    AMLODIPINE (NORVASC) 10 MG TABLET    Take 1 tablet by mouth daily    AMOXICILLIN-CLAVULANATE (AUGMENTIN) 875-125 MG PER TABLET    Take 1 tablet by mouth 2 times daily for 10 days    LISINOPRIL (PRINIVIL;ZESTRIL) 30 MG TABLET    Take 1 tablet by mouth daily    SILDENAFIL (VIAGRA) 25 MG TABLET    Take 1 tablet by mouth as needed for Erectile Dysfunction       ALLERGIES     has No Known Allergies. FAMILY HISTORY     He indicated that his mother is alive. He indicated that his father is alive. He indicated that the status of his neg hx is unknown. SOCIAL HISTORY       Social History     Tobacco Use    Smoking status: Never    Smokeless tobacco: Never   Vaping Use    Vaping Use: Never used   Substance Use Topics    Alcohol use: No    Drug use: No       PHYSICAL EXAM       ED Triage Vitals [01/14/23 1752]   BP Temp Temp Source Heart Rate Resp SpO2 Height Weight   (!) 161/87 98.4 °F (36.9 °C) Oral 85 20 95 % -- 285 lb (129.3 kg)       Physical Exam  Vitals and nursing note reviewed. Constitutional:       General: He is not in acute distress. Appearance: He is not ill-appearing or toxic-appearing. HENT:      Head: Normocephalic and atraumatic. Comments: Bilateral frontal and maxillary sinus tenderness     Right Ear: External ear normal.      Left Ear: External ear normal.      Nose: Nose normal. No congestion. Mouth/Throat:      Mouth: Mucous membranes are moist.      Pharynx: Oropharynx is clear. Eyes:      Conjunctiva/sclera: Conjunctivae normal.   Cardiovascular:      Rate and Rhythm: Normal rate and regular rhythm. Pulses: Normal pulses. Heart sounds: Normal heart sounds. Pulmonary:      Effort: Pulmonary effort is normal. No respiratory distress. Breath sounds: Normal breath sounds. No wheezing. Abdominal:      General: There is no distension. Palpations: Abdomen is soft. Tenderness: There is no abdominal tenderness. There is no guarding. Musculoskeletal:         General: Normal range of motion. Cervical back: Normal range of motion and neck supple. No tenderness. Lymphadenopathy:      Cervical: No cervical adenopathy. Skin:     General: Skin is warm and dry. Capillary Refill: Capillary refill takes less than 2 seconds. Comments: Eczema to b/l hands   Neurological:      General: No focal deficit present. Mental Status: He is alert and oriented to person, place, and time. Psychiatric:         Mood and Affect: Mood normal.       FORMAL DIAGNOSTIC RESULTS     RADIOLOGY: Interpretation per the Radiologist below, if available at the time of this note (none if blank):    XR CHEST PORTABLE   Final Result   1. Mild patchy airspace opacities overlying the right perihilar region and left lung base may represent mild atelectasis or pneumonia. 2. There are is thickening of the right pleural stripe at the lateral right lung base which represent scarring. **This report has been created using voice recognition software. It may contain minor errors which are inherent in voice recognition technology. **      Final report electronically signed by Dr. Mohan Ferraro on 1/14/2023 6:45 PM          LABS: (none if blank)  Labs Reviewed   CBC WITH AUTO DIFFERENTIAL - Abnormal; Notable for the following components:       Result Value    RDW-CV 14.9 (*)     RDW-SD 47.6 (*)     All other components within normal limits   COVID-19 & INFLUENZA COMBO   BASIC METABOLIC PANEL   TROPONIN   ANION GAP   GLOMERULAR FILTRATION RATE, ESTIMATED       (Any cultures that may have been sent were not resulted at the time of this patient visit)    [address] / ED COURSE:     1) Number and Complexity of Problems            Problem List This Visit:         Chief Complaint   Patient presents with    Chest Pain   Chest tightness, cough, rhinorrhea, sinus pain        Differential Diagnosis includes (but not limited to):  Sinusitis, COVID, influenza, stable angina, pneumonia, bronchitis        Diagnoses Considered but I have low suspicion of:   NSTEMI             Pertinent Comorbid Conditions:    HTN    2)  Data Reviewed (none if left blank)          My Independent interpretations:     EKG:      Normal sinus rhythm.   Normal rate, normal rhythm, normal axis, no ST elevation, normal QRS, normal QTC    Imaging: On chest x-ray looks like he has a slight pneumonia in the right lower lobe    Labs:      Lab work unremarkable, troponin negative                 Decision Rules/Clinical Scores utilized: Wells score 0            External Documentation Reviewed:         Previous patient encounter documents & history available on EMR was reviewed first time seen here             See Formal Diagnostic Results above for the lab and radiology tests and orders. 3)  Treatment and Disposition         ED Reassessment: Patient sitting in bed breathing without any difficulty         Case discussed with consulting clinician: N/A         Shared Decision-Making was performed and disposition discussed with the        Patient/Family and questions answered yes         Social determinants of health impacting treatment or disposition: N/A         Code Status: Full      Summary of Patient Presentation:      MDM  Number of Diagnoses or Management Options  Chronic maxillary sinusitis: established, worsening  Eczema, unspecified type: minor  Pneumonia of right lower lobe due to infectious organism: new, needed workup  Diagnosis management comments: Patient is a 57-year-old male with a history of HTN who presents with chronic productive cough, chest tightness, sinus pain. I have very low concern for ACS, troponin negative, no ST elevation. I think the patient most likely has a sinusitis, he has bilateral frontal and maxillary sinus tenderness. He is already on Augmentin however, is a slight pneumonia noticed on chest x-ray so we will add doxycycline for MRSA coverage. With his eczema along with his sinusitis, I think he benefit from taking prednisone, albuterol, Flonase. Patient's vitals are stable.        Amount and/or Complexity of Data Reviewed  Clinical lab tests: ordered and reviewed  Tests in the radiology section of CPT®: ordered and reviewed  Tests in the medicine section of CPT®: reviewed and ordered  Discuss the patient with other providers: yes  Independent visualization of images, tracings, or specimens: yes    Risk of Complications, Morbidity, and/or Mortality  Presenting problems: low  Management options: low    Patient Progress  Patient progress: stable  /   Vitals Reviewed:    Vitals:    01/14/23 1752 01/14/23 1902   BP: (!) 161/87 (!) 164/97   Pulse: 85 81   Resp: 20 20   Temp: 98.4 °F (36.9 °C)    TempSrc: Oral    SpO2: 95% 94%   Weight: 285 lb (129.3 kg)        The patient was seen and examined. Appropriate diagnostic testing was performed and results reviewed with the patient. The results of pertinent diagnostic studies and exam findings were discussed. The patients provisional diagnosis and plan of care were discussed with the patient and present family who expressed understanding. Any medications were reviewed and indications and risks of medications were discussed with the patient /family present. Strict verbal and written return precautions, instructions and appropriate follow-up provided to  the patient.      ED Medications administered this visit:  (None if blank)  Medications   aspirin chewable tablet 324 mg (324 mg Oral Given 1/14/23 1840)   cetirizine HCl (ZYRTEC) 5 MG/5ML solution 5 mg (5 mg Oral Given 1/14/23 1902)   fluticasone (FLONASE) 50 MCG/ACT nasal spray 1 spray (1 spray Each Nostril Given 1/14/23 1902)         PROCEDURES: (None if blank)  Procedures:     CRITICAL CARE: (None if blank)      DISCHARGE PRESCRIPTIONS: (None if blank)  New Prescriptions    ALBUTEROL SULFATE HFA (VENTOLIN HFA) 108 (90 BASE) MCG/ACT INHALER    Inhale 2 puffs into the lungs 4 times daily as needed for Wheezing    DOXYCYCLINE HYCLATE (VIBRA-TABS) 100 MG TABLET    Take 1 tablet by mouth 2 times daily for 5 days    FLUTICASONE (FLONASE) 50 MCG/ACT NASAL SPRAY    1 spray by Each Nostril route daily    PREDNISONE (DELTASONE) 20 MG TABLET    Take 1 tablet by mouth 2 times daily for 5 days       FINAL IMPRESSION      1. Chronic maxillary sinusitis    2. Eczema, unspecified type    3. Pneumonia of right lower lobe due to infectious organism          DISPOSITION/PLAN   DISPOSITION Decision To Discharge 01/14/2023 07:09:29 PM      OUTPATIENT FOLLOW UP THE PATIENT:  [address]  [address] [address]  [phone]    In 2 days  As needed    [address]  [address] [place] [address]  [phone]    If symptoms worsen    Lanie Pelayo DO    This transcription was electronically signed. Parts of this transcriptions may have been dictated by use of voice recognition software and electronically transcribed, and parts may have been transcribed with the assistance of an ED scribe. The transcription may contain errors not detected in proofreading. Please refer to my supervising physician's documentation if my documentation differs.     Electronically Signed: Lanie Pelayo DO, 01/14/23, 7:14 PM      Lanie Pelayo DO  Resident  01/14/23 5600

## 2023-01-15 LAB
EKG ATRIAL RATE: 80 BPM
EKG P AXIS: 57 DEGREES
EKG P-R INTERVAL: 168 MS
EKG Q-T INTERVAL: 338 MS
EKG QRS DURATION: 78 MS
EKG QTC CALCULATION (BAZETT): 389 MS
EKG R AXIS: 48 DEGREES
EKG T AXIS: 60 DEGREES
EKG VENTRICULAR RATE: 80 BPM

## 2023-01-15 NOTE — DISCHARGE INSTRUCTIONS
Take your medications as prescribed. Follow-up with your primary care doctor next few days. Return here if symptoms worsen.   Return here if you have worsening difficulty breathing or chest pain

## 2023-01-17 ENCOUNTER — NURSE ONLY (OUTPATIENT)
Dept: LAB | Age: 68
End: 2023-01-17

## 2023-01-17 DIAGNOSIS — C61 PROSTATE CANCER (HCC): ICD-10-CM

## 2023-01-17 LAB — PROSTATE SPECIFIC ANTIGEN: < 0.02 NG/ML (ref 0–1)

## 2023-01-18 ENCOUNTER — OFFICE VISIT (OUTPATIENT)
Dept: FAMILY MEDICINE CLINIC | Age: 68
End: 2023-01-18
Payer: MEDICARE

## 2023-01-18 VITALS
SYSTOLIC BLOOD PRESSURE: 128 MMHG | DIASTOLIC BLOOD PRESSURE: 68 MMHG | RESPIRATION RATE: 16 BRPM | HEART RATE: 76 BPM | TEMPERATURE: 97.8 F | HEIGHT: 72 IN | WEIGHT: 288.4 LBS | BODY MASS INDEX: 39.06 KG/M2 | OXYGEN SATURATION: 98 %

## 2023-01-18 DIAGNOSIS — J15.9 BACTERIAL PNEUMONIA: Primary | ICD-10-CM

## 2023-01-18 DIAGNOSIS — I10 HYPERTENSION, ESSENTIAL: Chronic | ICD-10-CM

## 2023-01-18 DIAGNOSIS — E66.9 OBESITY (BMI 30-39.9): Chronic | ICD-10-CM

## 2023-01-18 PROBLEM — R97.20 ELEVATED PSA, LESS THAN 10 NG/ML: Status: RESOLVED | Noted: 2021-07-13 | Resolved: 2023-01-18

## 2023-01-18 PROCEDURE — 3074F SYST BP LT 130 MM HG: CPT | Performed by: FAMILY MEDICINE

## 2023-01-18 PROCEDURE — 1036F TOBACCO NON-USER: CPT | Performed by: FAMILY MEDICINE

## 2023-01-18 PROCEDURE — 3078F DIAST BP <80 MM HG: CPT | Performed by: FAMILY MEDICINE

## 2023-01-18 PROCEDURE — G8417 CALC BMI ABV UP PARAM F/U: HCPCS | Performed by: FAMILY MEDICINE

## 2023-01-18 PROCEDURE — G8427 DOCREV CUR MEDS BY ELIG CLIN: HCPCS | Performed by: FAMILY MEDICINE

## 2023-01-18 PROCEDURE — 99214 OFFICE O/P EST MOD 30 MIN: CPT | Performed by: FAMILY MEDICINE

## 2023-01-18 PROCEDURE — 1123F ACP DISCUSS/DSCN MKR DOCD: CPT | Performed by: FAMILY MEDICINE

## 2023-01-18 PROCEDURE — G8484 FLU IMMUNIZE NO ADMIN: HCPCS | Performed by: FAMILY MEDICINE

## 2023-01-18 PROCEDURE — 3017F COLORECTAL CA SCREEN DOC REV: CPT | Performed by: FAMILY MEDICINE

## 2023-01-18 RX ORDER — BENZONATATE 100 MG/1
CAPSULE ORAL
Qty: 60 CAPSULE | Refills: 0 | Status: SHIPPED | OUTPATIENT
Start: 2023-01-18 | End: 2023-01-28

## 2023-01-18 RX ORDER — DOXYCYCLINE HYCLATE 100 MG
100 TABLET ORAL 2 TIMES DAILY
Qty: 10 TABLET | Refills: 0 | Status: SHIPPED | OUTPATIENT
Start: 2023-01-18 | End: 2023-01-23

## 2023-01-18 SDOH — ECONOMIC STABILITY: FOOD INSECURITY: WITHIN THE PAST 12 MONTHS, THE FOOD YOU BOUGHT JUST DIDN'T LAST AND YOU DIDN'T HAVE MONEY TO GET MORE.: NEVER TRUE

## 2023-01-18 SDOH — ECONOMIC STABILITY: FOOD INSECURITY: WITHIN THE PAST 12 MONTHS, YOU WORRIED THAT YOUR FOOD WOULD RUN OUT BEFORE YOU GOT MONEY TO BUY MORE.: NEVER TRUE

## 2023-01-18 ASSESSMENT — PATIENT HEALTH QUESTIONNAIRE - PHQ9
2. FEELING DOWN, DEPRESSED OR HOPELESS: 0
1. LITTLE INTEREST OR PLEASURE IN DOING THINGS: 0
SUM OF ALL RESPONSES TO PHQ9 QUESTIONS 1 & 2: 0
SUM OF ALL RESPONSES TO PHQ QUESTIONS 1-9: 0

## 2023-01-18 ASSESSMENT — SOCIAL DETERMINANTS OF HEALTH (SDOH): HOW HARD IS IT FOR YOU TO PAY FOR THE VERY BASICS LIKE FOOD, HOUSING, MEDICAL CARE, AND HEATING?: NOT HARD AT ALL

## 2023-01-18 NOTE — PATIENT INSTRUCTIONS
LAB INSTRUCTIONS:    Please complete labs IN 4 to 6 week(s). Please fast for 8 hours prior to lab collection. The clinic will call you within 1 week of collection. If you have not heard from us within that amount of time, please call us at [phone].

## 2023-01-18 NOTE — PROGRESS NOTES
Chief Complaint   Patient presents with    Follow-up     ED follow up for sinus infection    Hypertension    Obesity       History obtained from the patient. SUBJECTIVE:  Gil High is a 69 y.o. male that presents today for     -ER f/u: In ER x 2 for cough etc  On augmentin 1st  Went back to ER on 1/14  Dx with ?  PNA  Started doxy, pred, prn alb and flonase  Here for f/u  Feeling a little better  Less cough  Occ wheezing and SOB  No fevers  Here today because felt should be doing a bit better      -HTN:    HPI:    Taking meds as prescribed ?: yes  Tolerating well ?: yes  Side Effects ?: no  BP at home ?: <140/90  Working on TLCS ?: yes  Chest Pain/SOB/Palpitations? denies    BP Readings from Last 3 Encounters:   01/18/23 128/68   01/14/23 (!) 164/97   01/05/23 (!) 143/78       -Obesity:  Diet ok  Not exercising      Age/Gender Health Maintenance    Lipid -   Lab Results   Component Value Date    CHOL 161 07/13/2021    CHOL 156 01/22/2014     Lab Results   Component Value Date    TRIG 101 07/13/2021    TRIG 141 01/22/2014     Lab Results   Component Value Date    HDL 42 07/13/2021    HDL 35 01/22/2014     Lab Results   Component Value Date    LDLCALC 99 07/13/2021    LDLCALC 93 01/22/2014     No results found for: LABVLDL, VLDL  No results found for: CHOLHDLRATIO      DM Screen -   Lab Results   Component Value Date/Time    GLUCOSE 101 01/14/2023 06:00 PM       Colon Cancer Screening - + polyp SEPT 2020, repeat 5 years per GI, Ashley  Lung Cancer Screening - never smoker    Tetanus - to get at pharmacy per medicare rules  Influenza Vaccine - Declines JAN 2023  Pneumonia Vaccine - UTD SEPT 2020  Zoster - to get at pharmacy per medicare rules     PSA testing discussion - follows with urology  Lab Results   Component Value Date    PSA <0.02 01/17/2023    PSA <0.02 07/26/2022    PSA <0.02 02/07/2022       AAA Screening - never smoker      Current Outpatient Medications   Medication Sig Dispense Refill doxycycline hyclate (VIBRA-TABS) 100 MG tablet Take 1 tablet by mouth 2 times daily for 5 days 10 tablet 0    benzonatate (TESSALON PERLES) 100 MG capsule Take 1 to 2 tablets by mouth every 8 hours as needed for cough. 60 capsule 0    albuterol sulfate HFA (VENTOLIN HFA) 108 (90 Base) MCG/ACT inhaler Inhale 2 puffs into the lungs 4 times daily as needed for Wheezing 18 g 0    fluticasone (FLONASE) 50 MCG/ACT nasal spray 1 spray by Each Nostril route daily 16 g 0    predniSONE (DELTASONE) 20 MG tablet Take 1 tablet by mouth 2 times daily for 5 days 10 tablet 0    doxycycline hyclate (VIBRA-TABS) 100 MG tablet Take 1 tablet by mouth 2 times daily for 5 days 10 tablet 0    lisinopril (PRINIVIL;ZESTRIL) 30 MG tablet Take 1 tablet by mouth daily 90 tablet 3    amLODIPine (NORVASC) 10 MG tablet Take 1 tablet by mouth daily 90 tablet 3    sildenafil (VIAGRA) 25 MG tablet Take 1 tablet by mouth as needed for Erectile Dysfunction 30 tablet 5     No current facility-administered medications for this visit. Orders Placed This Encounter   Medications    doxycycline hyclate (VIBRA-TABS) 100 MG tablet     Sig: Take 1 tablet by mouth 2 times daily for 5 days     Dispense:  10 tablet     Refill:  0    benzonatate (TESSALON PERLES) 100 MG capsule     Sig: Take 1 to 2 tablets by mouth every 8 hours as needed for cough. Dispense:  60 capsule     Refill:  0         All medications reviewed and reconciled, including OTC and herbal medications. Updated list given to patient. Patient Active Problem List    Diagnosis Date Noted    Prostate cancer ([facility] [address] 10/18/2021    Hypertension, essential     Obesity (BMI 30-39. 9)     Psoriasis        Past Medical History:   Diagnosis Date    Cancer ([address]     prostate    Hypertension, essential     Obesity (BMI 30-39. 9)     Psoriasis        Past Surgical History:   Procedure Laterality Date    KNEE ARTHROSCOPY      right    PROSTATE BIOPSY  2021    PROSTATECTOMY Bilateral 12/2/2021 ROBOTIC ASSISTED LAPAROSCOPIC RADICAL PROSTECTOMY WITH BILATERAL LYMPH NODE DISSECTION performed by Phillip Villar MD at [facility] OR    ULNAR TUNNEL RELEASE         No Known Allergies      Social History     Tobacco Use    Smoking status: Never    Smokeless tobacco: Never   Substance Use Topics    Alcohol use: No       Family History   Problem Relation Age of Onset    Heart Disease Father     Colon Cancer Neg Hx     Prostate Cancer Neg Hx          I have reviewed the patient's past medical history, past surgical history, allergies, medications, social and family history and I have made updates where appropriate.      Review of Systems  Positive responses are highlighted in bold    Constitutional:  Fever, Chills, Night Sweats, Fatigue, Unexpected changes in weight  HENT:  Ear pain, Tinnitus, Nosebleeds, Trouble swallowing, Hearing loss, Sore throat  Cardiovascular:  Chest Pain, Palpitations, Orthopnea, Paroxysmal Nocturnal Dyspnea  Respiratory:  Cough, Wheezing, Shortness of breath, Chest tightness, Apnea  Gastrointestinal:  Nausea, Vomiting, Diarrhea, Constipation, Heartburn, Blood in stool  Genitourinary:  Difficulty or painful urination, Flank pain, Change in frequency, Urgency  Skin:  Color change, Rash, Itching, Wound  Musculoskeletal:  Joint pain, Back pain, Gait problems, Joint swelling, Myalgias  Neurological:  Dizziness, Headaches, Presyncope, Numbness, Seizures, Tremors  Endocrine:  Heat Intolerance, Cold Intolerance, Polydipsia, Polyphagia, Polyuria      PHYSICAL EXAM:  Vitals:    01/18/23 1058   BP: 128/68   Pulse: 76   Resp: 16   Temp: 97.8 °F (36.6 °C)   SpO2: 98%   Weight: 288 lb 6.4 oz (130.8 kg)   Height: 6' (1.829 m)     Body mass index is 39.11 kg/m².       VS Reviewed  General Appearance: A&O x 3, No acute distress,well developed and well- nourished  Eyes: pupils equal, round, and reactive to light, extraocular eye movements intact, conjunctivae and eye lids without erythema  ENT: bilateral TM normal  without fluid or infection, neck without nodes, throat normal without erythema or exudate, sinuses nontender, post nasal drip noted, nasal mucosa congested, and Oropharynx clear, without erythema, exudate, or thrush. Neck: supple and non-tender without mass, no thyromegaly or thyroid nodules, no cervical lymphadenopathy  Pulmonary/Chest: good air movement bilaterally. Scattered rhonchi. No wheezing or crackles. No accessory muscle use. No distress. Cardiovascular: S1 and S2 auscultated w/ RRR. No murmurs, rubs, clicks, or gallops, distal pulses intact. Abdomen: soft, non-tender, non-distended, bowel sounds physiologic,  no rebound or guarding, no masses or hernias noted. Liver and spleen without enlargement. Extremities: no cyanosis, clubbing or edema of the lower extremities. Skin: warm and dry, no rash or erythema. Lab Results   Component Value Date    WBC 5.8 01/14/2023    HGB 14.0 01/14/2023    HCT 42.4 01/14/2023    MCV 87.8 01/14/2023     01/14/2023     Lab Results   Component Value Date/Time     01/14/2023 06:00 PM    K 4.1 01/14/2023 06:00 PM     01/14/2023 06:00 PM    CO2 25 01/14/2023 06:00 PM    BUN 12 01/14/2023 06:00 PM    CREATININE 0.7 01/14/2023 06:00 PM    GLUCOSE 101 01/14/2023 06:00 PM    CALCIUM 9.1 01/14/2023 06:00 PM      Component      Latest Ref Rng & Units 1/14/2023           6:00 PM   SARS-CoV-2 RNA, RT PCR      NOT DETECTED NOT DETECTED   INFLUENZA A      NOT DETECTED NOT DETECTED   INFLUENZA B      NOT DETECTED NOT DETECTED       Narrative   PROCEDURE: XR CHEST PORTABLE       CLINICAL INFORMATION: Chest tightness, productive cough        COMPARISON: 10/20/2021       TECHNIQUE:  AP mobile chest single view         FINDINGS:        The heart size appears within normal limits. Mild patchy airspace opacities are seen at the right perihilar region which may represent mild atelectasis or pneumonia.  Minimal parenchymal linear opacities at the left lung base are also seen which may also represent minimal atelectasis or pneumonia. No pleural effusion or pneumothorax is seen. However, there is thickening of the right pleural stripe at the lateral right lung base which represent scarring. Impression   1. Mild patchy airspace opacities overlying the right perihilar region and left lung base may represent mild atelectasis or pneumonia. 2. There are is thickening of the right pleural stripe at the lateral right lung base which represent scarring. **This report has been created using voice recognition software. It may contain minor errors which are inherent in voice recognition technology. **       Final report electronically signed by Dr. Evelia [place] on 1/14/2023 6:45 PM       ASSESSMENT & PLAN  1. Bacterial pneumonia    Labs, notes and cxr reviewed from ER (as above)  VSS  Exam reassuring  Will extend out doxy for 10 days total  Add tessalon  Fluids  Rest  Tylenol  Off work until Monday  F/u if no better  Reviewed ER precautions, pt understands. - doxycycline hyclate (VIBRA-TABS) 100 MG tablet; Take 1 tablet by mouth 2 times daily for 5 days  Dispense: 10 tablet; Refill: 0  - benzonatate (TESSALON PERLES) 100 MG capsule; Take 1 to 2 tablets by mouth every 8 hours as needed for cough. Dispense: 60 capsule; Refill: 0    2. Hypertension, essential    Stable  At goal  Con't Norvasc  Labs ordered    - CBC with Auto Differential; Future  - Comprehensive Metabolic Panel; Future  - Lipid Panel; Future  - TSH with Reflex; Future  - Microalbumin / Creatinine Urine Ratio; Future    3. Obesity (BMI 30-39. 9)    Discussed wt loss strategies. DISPOSITION    Return in about 1 year (around 1/18/2024) for AWV, follow-up on chronic medical conditions, sooner as needed. Nena Mosley released without restrictions.     PATIENT COUNSELING    Nena Mosley received counseling on the following healthy behaviors: nutrition, exercise and medication adherence    Barriers to learning and self management: none    Discussed use, benefit, and side effects of prescribed medications. Barriers to medication compliance addressed. All patient questions answered. Pt voiced understanding.      Future Appointments   Date Time Provider Pramod Cristobal   1/24/2023  9:00 AM Maikol Camilo MD [address] [place] [phone]       Electronically signed by Chato Genao DO on 1/18/2023 at 11:38 AM

## 2023-01-24 ENCOUNTER — OFFICE VISIT (OUTPATIENT)
Dept: UROLOGY | Age: 68
End: 2023-01-24
Payer: MEDICARE

## 2023-01-24 VITALS
WEIGHT: 285 LBS | SYSTOLIC BLOOD PRESSURE: 102 MMHG | HEIGHT: 72 IN | BODY MASS INDEX: 38.6 KG/M2 | DIASTOLIC BLOOD PRESSURE: 70 MMHG

## 2023-01-24 DIAGNOSIS — N39.3 STRESS INCONTINENCE: ICD-10-CM

## 2023-01-24 DIAGNOSIS — C61 PROSTATE CANCER (HCC): Primary | ICD-10-CM

## 2023-01-24 DIAGNOSIS — N52.31 ERECTILE DYSFUNCTION AFTER RADICAL PROSTATECTOMY: ICD-10-CM

## 2023-01-24 PROCEDURE — G8417 CALC BMI ABV UP PARAM F/U: HCPCS | Performed by: UROLOGY

## 2023-01-24 PROCEDURE — G8427 DOCREV CUR MEDS BY ELIG CLIN: HCPCS | Performed by: UROLOGY

## 2023-01-24 PROCEDURE — 1036F TOBACCO NON-USER: CPT | Performed by: UROLOGY

## 2023-01-24 PROCEDURE — G8484 FLU IMMUNIZE NO ADMIN: HCPCS | Performed by: UROLOGY

## 2023-01-24 PROCEDURE — 3017F COLORECTAL CA SCREEN DOC REV: CPT | Performed by: UROLOGY

## 2023-01-24 PROCEDURE — 3078F DIAST BP <80 MM HG: CPT | Performed by: UROLOGY

## 2023-01-24 PROCEDURE — 1123F ACP DISCUSS/DSCN MKR DOCD: CPT | Performed by: UROLOGY

## 2023-01-24 PROCEDURE — 99214 OFFICE O/P EST MOD 30 MIN: CPT | Performed by: UROLOGY

## 2023-01-24 PROCEDURE — 3074F SYST BP LT 130 MM HG: CPT | Performed by: UROLOGY

## 2023-01-24 RX ORDER — SILDENAFIL 25 MG/1
25 TABLET, FILM COATED ORAL PRN
Qty: 30 TABLET | Refills: 5 | Status: SHIPPED | OUTPATIENT
Start: 2023-01-24 | End: 2024-01-24

## 2023-01-24 NOTE — PROGRESS NOTES
[address] [address]  Dept: [phone]  Dept Fax: [phone]  Loc: [facility] Urology Office Note -     Patient:  Miladys Goodson  YOB: 1955    The patient is a 69 y.o. male who presents today for evaluation of the following problems:   Chief Complaint   Patient presents with    Prostate Cancer    Follow-up    Results     Review results of PSA  <0.02        History of Present Illness:    Prostate cancer  Needs PSA    FINAL DIAGNOSIS:   Prostate and bilateral pelvic lymph nodes, radical prostatectomy and   bilateral pelvic lymphadenectomy:             Prostatic adenocarcinoma, acinar type, Kelly score 3+4 = 7   (grade group 2 of 5). Carcinoma involves approximately 10% of the submitted tissue and is   confined to       the prostate. Negative for extraprostatic extension, bladder neck invasion,   seminal vesicle       invasion, and margin involvement. Six lymph nodes with no evidence of malignancy.  (0/6)       Nodular prostatic hyperplasia with mixed inflammation. pT2, pN0       HARPREET  stable  Wears one pad    ED  Still not at goal      Requested/reviewed records from Guy Segura DO office and/or outside physician/EMR    (Patient's old records have been requested, reviewed and pertinent findings summarized in today's note.)    Procedures Today: N/A    Last several PSA's:  Lab Results   Component Value Date    PSA <0.02 01/17/2023    PSA <0.02 07/26/2022    PSA <0.02 02/07/2022       Last total testosterone:  No results found for: TESTOSTERONE    Urinalysis today:  No results found for this visit on 01/24/23.     Last BUN and creatinine:  Lab Results   Component Value Date    BUN 12 01/14/2023     Lab Results   Component Value Date    CREATININE 0.7 01/14/2023       Imaging Reviewed during this Office Visit:   Corine Gale MD independently reviewed the images and verified the radiology reports from:    No results found. PAST MEDICAL, FAMILY AND SOCIAL HISTORY:  Past Medical History:   Diagnosis Date    Cancer ([address]     prostate    Hypertension, essential     Obesity (BMI 30-39. 9)     Psoriasis      Past Surgical History:   Procedure Laterality Date    KNEE ARTHROSCOPY      right    PROSTATE BIOPSY  2021    PROSTATECTOMY Bilateral 12/2/2021    ROBOTIC ASSISTED LAPAROSCOPIC RADICAL PROSTECTOMY WITH BILATERAL LYMPH NODE DISSECTION performed by Charli Mead MD at [facility]       Family History   Problem Relation Age of Onset    Heart Disease Father     Colon Cancer Neg Hx     Prostate Cancer Neg Hx      Outpatient Medications Marked as Taking for the 1/24/23 encounter (Office Visit) with Charli Mead MD   Medication Sig Dispense Refill    benzonatate (TESSALON PERLES) 100 MG capsule Take 1 to 2 tablets by mouth every 8 hours as needed for cough. 60 capsule 0    albuterol sulfate HFA (VENTOLIN HFA) 108 (90 Base) MCG/ACT inhaler Inhale 2 puffs into the lungs 4 times daily as needed for Wheezing 18 g 0    fluticasone (FLONASE) 50 MCG/ACT nasal spray 1 spray by Each Nostril route daily 16 g 0    lisinopril (PRINIVIL;ZESTRIL) 30 MG tablet Take 1 tablet by mouth daily 90 tablet 3    amLODIPine (NORVASC) 10 MG tablet Take 1 tablet by mouth daily 90 tablet 3    sildenafil (VIAGRA) 25 MG tablet Take 1 tablet by mouth as needed for Erectile Dysfunction 30 tablet 5       Patient has no known allergies.   Social History     Tobacco Use   Smoking Status Never   Smokeless Tobacco Never      (If patient a smoker, smoking cessation counseling offered)   Social History     Substance and Sexual Activity   Alcohol Use No       REVIEW OF SYSTEMS:  Constitutional: negative  Eyes: negative  Respiratory: negative  Cardiovascular: negative  Gastrointestinal: negative  Genitourinary: see HPI  Musculoskeletal: negative  Skin: negative   Neurological: negative  Hematological/Lymphatic: negative  Psychological: negative      Physical Exam:    This a 69 y.o. male  Vitals:    01/24/23 0910   BP: 102/70     Body mass index is 38.65 kg/m². Constitutional: Patient in no acute distress;         Assessment and Plan        1. Prostate cancer ([address]    2. Erectile dysfunction after radical prostatectomy    3. Stress incontinence               Plan:      Has had a lot of medical issues recently. Prostate cancer- hx of RALP. Negative margins. PSA in six months. ED- sildenafil 25 mg making pt light headed. Takes it on occasion. Discussed trimix in past. Hold off  HARPREET- stable. Discused kegel exercises. Psa in six months. Afterwards if doing well, annually      Prescriptions Ordered:  No orders of the defined types were placed in this encounter. Orders Placed:  No orders of the defined types were placed in this encounter.            Rosette Joel MD

## 2023-02-20 DIAGNOSIS — I10 HYPERTENSION, ESSENTIAL: Chronic | ICD-10-CM

## 2023-02-21 RX ORDER — LISINOPRIL 30 MG/1
30 TABLET ORAL DAILY
Qty: 90 TABLET | Refills: 3 | Status: SHIPPED | OUTPATIENT
Start: 2023-02-21

## 2023-04-26 DIAGNOSIS — I10 HYPERTENSION, ESSENTIAL: Chronic | ICD-10-CM

## 2023-04-26 RX ORDER — LISINOPRIL 30 MG/1
30 TABLET ORAL DAILY
Qty: 90 TABLET | Refills: 3 | Status: SHIPPED | OUTPATIENT
Start: 2023-04-26

## 2023-04-26 NOTE — TELEPHONE ENCOUNTER
Recent Visits  Date Type Provider Dept   01/18/23 Office Visit Yani Snyder, DO [facility]   11/17/21 Office Visit Yani Snyder, DO [facility]   11/09/21 Office Visit Yani Snyder, DO [facility]   Showing recent visits within past 540 days with a meds authorizing provider and meeting all other requirements  Future Appointments  No visits were found meeting these conditions.   Showing future appointments within next 150 days with a meds authorizing provider and meeting all other requirements       Future Appointments   Date Time Provider Pramod Cristobal   7/25/2023  3:15 PM Azar Turcios, [facility]

## 2023-07-24 ENCOUNTER — NURSE ONLY (OUTPATIENT)
Dept: LAB | Age: 68
End: 2023-07-24

## 2023-07-24 DIAGNOSIS — C61 PROSTATE CANCER (HCC): ICD-10-CM

## 2023-07-24 LAB — PSA SERPL-MCNC: < 0.02 NG/ML (ref 0–1)

## 2023-07-25 ENCOUNTER — OFFICE VISIT (OUTPATIENT)
Dept: UROLOGY | Age: 68
End: 2023-07-25
Payer: MEDICARE

## 2023-07-25 VITALS — BODY MASS INDEX: 38.6 KG/M2 | RESPIRATION RATE: 16 BRPM | HEIGHT: 72 IN | WEIGHT: 285 LBS

## 2023-07-25 DIAGNOSIS — C61 PROSTATE CANCER (HCC): Primary | ICD-10-CM

## 2023-07-25 DIAGNOSIS — N39.3 STRESS INCONTINENCE: ICD-10-CM

## 2023-07-25 DIAGNOSIS — N52.31 ERECTILE DYSFUNCTION AFTER RADICAL PROSTATECTOMY: ICD-10-CM

## 2023-07-25 PROCEDURE — 1036F TOBACCO NON-USER: CPT | Performed by: UROLOGY

## 2023-07-25 PROCEDURE — G8417 CALC BMI ABV UP PARAM F/U: HCPCS | Performed by: UROLOGY

## 2023-07-25 PROCEDURE — 99214 OFFICE O/P EST MOD 30 MIN: CPT | Performed by: UROLOGY

## 2023-07-25 PROCEDURE — 1123F ACP DISCUSS/DSCN MKR DOCD: CPT | Performed by: UROLOGY

## 2023-07-25 PROCEDURE — 3017F COLORECTAL CA SCREEN DOC REV: CPT | Performed by: UROLOGY

## 2023-07-25 PROCEDURE — G8427 DOCREV CUR MEDS BY ELIG CLIN: HCPCS | Performed by: UROLOGY

## 2023-07-25 NOTE — PROGRESS NOTES
[address] [facility]  [address] [place] [id number]  Dept: [phone]  Dept Fax: [phone]  Loc: [facility] Urology Office Note -     Patient:  Barbara Rios  YOB: 1955    The patient is a 77 y.o. male who presents today for evaluation of the following problems:   Chief Complaint   Patient presents with    Follow-up    Results     Review PSA     Prostate Cancer        History of Present Illness:    Prostate cancer  Here w PSA    FINAL DIAGNOSIS:   Prostate and bilateral pelvic lymph nodes, radical prostatectomy and   bilateral pelvic lymphadenectomy:             Prostatic adenocarcinoma, acinar type, Hamel score 3+4 = 7   (grade group 2 of 5). Carcinoma involves approximately 10% of the submitted tissue and is   confined to       the prostate. Negative for extraprostatic extension, bladder neck invasion,   seminal vesicle       invasion, and margin involvement. Six lymph nodes with no evidence of malignancy.  (0/6)       Nodular prostatic hyperplasia with mixed inflammation. pT2, pN0       HARPREET  stable  Wears one pad    ED  Still not at goal  Se from pde 5i      Requested/reviewed records from Lola Xiong DO office and/or outside physician/EMR    (Patient's old records have been requested, reviewed and pertinent findings summarized in today's note.)    Procedures Today: N/A    Last several PSA's:  Lab Results   Component Value Date    PSA <0.02 07/24/2023    PSA <0.02 01/17/2023    PSA <0.02 07/26/2022       Last total testosterone:  No results found for: TESTOSTERONE    Urinalysis today:  No results found for this visit on 07/25/23.     Last BUN and creatinine:  Lab Results   Component Value Date    BUN 12 01/14/2023     Lab Results   Component Value Date    CREATININE 0.7 01/14/2023       Imaging Reviewed during this Office Visit:   Giuliana Charlton MD independently

## 2023-09-12 ENCOUNTER — E-VISIT (OUTPATIENT)
Dept: FAMILY MEDICINE CLINIC | Age: 68
End: 2023-09-12
Payer: MEDICARE

## 2023-09-12 DIAGNOSIS — B35.6 TINEA CRURIS: ICD-10-CM

## 2023-09-12 PROCEDURE — 99422 OL DIG E/M SVC 11-20 MIN: CPT | Performed by: FAMILY MEDICINE

## 2023-09-12 RX ORDER — FLUCONAZOLE 100 MG/1
200 TABLET ORAL DAILY
Qty: 20 TABLET | Refills: 0 | Status: SHIPPED | OUTPATIENT
Start: 2023-09-12 | End: 2023-09-15

## 2023-09-13 NOTE — PROGRESS NOTES
Appears c/w prior severe tinea cruris that responded to oral diflucan prior. No systemic sxs  Will proceed with diflucan   He will f/u if not improving  Reviewed ER precautions    15 min spent     Diagnosis Orders   1.  Tinea cruris  fluconazole (DIFLUCAN) 100 MG tablet

## 2023-09-15 ENCOUNTER — HOSPITAL ENCOUNTER (EMERGENCY)
Age: 68
Discharge: HOME | End: 2023-09-15
Payer: MEDICARE

## 2023-09-15 VITALS
RESPIRATION RATE: 20 BRPM | DIASTOLIC BLOOD PRESSURE: 90 MMHG | OXYGEN SATURATION: 98 % | HEART RATE: 91 BPM | TEMPERATURE: 98.3 F | SYSTOLIC BLOOD PRESSURE: 125 MMHG

## 2023-09-15 DIAGNOSIS — B37.2 CANDIDIASIS OF SKIN: Primary | ICD-10-CM

## 2023-09-15 PROCEDURE — 99283 EMERGENCY DEPT VISIT LOW MDM: CPT

## 2023-09-15 RX ORDER — FLUCONAZOLE 100 MG/1
100 TABLET ORAL DAILY
Qty: 10 TABLET | Refills: 0 | Status: SHIPPED | OUTPATIENT
Start: 2023-09-15 | End: 2023-09-25

## 2023-09-15 RX ORDER — CEPHALEXIN 500 MG/1
500 CAPSULE ORAL 4 TIMES DAILY
Qty: 28 CAPSULE | Refills: 0 | Status: SHIPPED | OUTPATIENT
Start: 2023-09-15 | End: 2023-09-22

## 2023-09-15 NOTE — ED NOTES
Patient to ED for a yeast infection around his groin and abdominal folds.  Patient was prescribed diflucan on Wednesday however the area is not improving      Ana Galvan RN  09/15/23 [id number]

## 2023-10-09 DIAGNOSIS — I10 HYPERTENSION, UNSPECIFIED TYPE: ICD-10-CM

## 2023-10-10 RX ORDER — AMLODIPINE BESYLATE 10 MG/1
10 TABLET ORAL DAILY
Qty: 90 TABLET | Refills: 0 | Status: SHIPPED | OUTPATIENT
Start: 2023-10-10

## 2023-10-10 NOTE — TELEPHONE ENCOUNTER
Recent Visits  Date Type Provider Dept   01/18/23 Office Visit Yuko Friend, DO [facility]   Showing recent visits within past 540 days with a meds authorizing provider and meeting all other requirements  Future Appointments  No visits were found meeting these conditions.   Showing future appointments within next 150 days with a meds authorizing provider and meeting all other requirements

## 2023-12-04 ENCOUNTER — HOSPITAL ENCOUNTER (EMERGENCY)
Age: 68
Discharge: HOME | End: 2023-12-04
Payer: MEDICARE

## 2023-12-04 VITALS
HEART RATE: 92 BPM | BODY MASS INDEX: 37.93 KG/M2 | SYSTOLIC BLOOD PRESSURE: 145 MMHG | RESPIRATION RATE: 20 BRPM | HEIGHT: 72 IN | WEIGHT: 280 LBS | DIASTOLIC BLOOD PRESSURE: 78 MMHG | OXYGEN SATURATION: 94 % | TEMPERATURE: 98.6 F

## 2023-12-04 DIAGNOSIS — J06.9 UPPER RESPIRATORY TRACT INFECTION, UNSPECIFIED TYPE: Primary | ICD-10-CM

## 2023-12-04 LAB — SARS-COV-2 RDRP RESP QL NAA+PROBE: NOT  DETECTED

## 2023-12-04 PROCEDURE — 87635 SARS-COV-2 COVID-19 AMP PRB: CPT

## 2023-12-04 PROCEDURE — 99213 OFFICE O/P EST LOW 20 MIN: CPT

## 2023-12-04 PROCEDURE — 99213 OFFICE O/P EST LOW 20 MIN: CPT | Performed by: NURSE PRACTITIONER

## 2023-12-04 RX ORDER — AZITHROMYCIN 250 MG/1
250 TABLET, FILM COATED ORAL SEE ADMIN INSTRUCTIONS
Qty: 6 TABLET | Refills: 0 | Status: SHIPPED | OUTPATIENT
Start: 2023-12-04 | End: 2023-12-09

## 2023-12-04 RX ORDER — LORATADINE 10 MG
1 CAPSULE ORAL EVERY 6 HOURS PRN
Qty: 60 CAPSULE | Refills: 0 | Status: SHIPPED | OUTPATIENT
Start: 2023-12-04

## 2023-12-04 RX ORDER — PREDNISONE 20 MG/1
40 TABLET ORAL DAILY
Qty: 14 TABLET | Refills: 0 | Status: SHIPPED | OUTPATIENT
Start: 2023-12-04 | End: 2023-12-11

## 2023-12-04 ASSESSMENT — ENCOUNTER SYMPTOMS
DIARRHEA: 0
NAUSEA: 0
SHORTNESS OF BREATH: 0
SORE THROAT: 0
SINUS PRESSURE: 1
VOMITING: 0
COUGH: 1

## 2023-12-04 ASSESSMENT — PAIN DESCRIPTION - LOCATION: LOCATION: FACE

## 2023-12-04 ASSESSMENT — PAIN DESCRIPTION - PAIN TYPE: TYPE: ACUTE PAIN

## 2023-12-04 ASSESSMENT — PAIN - FUNCTIONAL ASSESSMENT
PAIN_FUNCTIONAL_ASSESSMENT: ACTIVITIES ARE NOT PREVENTED
PAIN_FUNCTIONAL_ASSESSMENT: 0-10

## 2023-12-04 ASSESSMENT — PAIN DESCRIPTION - DESCRIPTORS: DESCRIPTORS: ACHING

## 2023-12-04 ASSESSMENT — PAIN SCALES - GENERAL: PAINLEVEL_OUTOF10: 5

## 2023-12-04 NOTE — ED TRIAGE NOTES
To room with c/o cough and nasal congestion that started Friday. Chest feel tight, productive with brown- yellow mucous.

## 2023-12-04 NOTE — ED PROVIDER NOTES
[address]  Urgent Care Encounter       CHIEF COMPLAINT       Chief Complaint   Patient presents with    Cough    Nasal Congestion       Nurses Notes reviewed and I agree except as noted in the HPI. HISTORY OF PRESENT ILLNESS   Homero Shell is a 77 y.o. male who presents for evaluation of cough, congestion, and sinus pressure that been ongoing for the past 3 to 4 days. Patient states that initially the symptoms began with mild fever but this has resolved. States that the pressure in his face/sinuses seems to be increasing. Denies taking any medications for the symptoms at home. The history is provided by the patient. REVIEW OF SYSTEMS     Review of Systems   Constitutional:  Negative for chills and fever. HENT:  Positive for congestion and sinus pressure. Negative for sore throat. Respiratory:  Positive for cough. Negative for shortness of breath. Cardiovascular:  Negative for chest pain. Gastrointestinal:  Negative for diarrhea, nausea and vomiting. Musculoskeletal:  Negative for arthralgias and myalgias. Skin:  Negative for rash. Allergic/Immunologic: Negative for environmental allergies. Neurological:  Negative for headaches. PAST MEDICAL HISTORY         Diagnosis Date    Cancer [facility])     prostate    Hypertension, essential     Obesity (BMI 30-39. 9)     Psoriasis        SURGICALHISTORY     Patient  has a past surgical history that includes Knee arthroscopy; Ulnar tunnel release; Prostate biopsy (2021); and Prostatectomy (Bilateral, 12/2/2021). CURRENT MEDICATIONS       Previous Medications    AMLODIPINE (NORVASC) 10 MG TABLET    Take 1 tablet by mouth daily    LISINOPRIL (PRINIVIL;ZESTRIL) 30 MG TABLET    Take 1 tablet by mouth daily    SILDENAFIL (VIAGRA) 25 MG TABLET    Take 1 tablet by mouth as needed for Erectile Dysfunction    SKIN PROTECTANTS, MISC.  (INTERDRY 69\"B006\") SHEE    Apply 1 Application topically daily       ALLERGIES     Patient

## 2023-12-06 ENCOUNTER — HOSPITAL ENCOUNTER (EMERGENCY)
Age: 68
Discharge: HOME | End: 2023-12-06
Payer: MEDICARE

## 2023-12-06 VITALS
TEMPERATURE: 98.9 F | SYSTOLIC BLOOD PRESSURE: 125 MMHG | WEIGHT: 280 LBS | HEART RATE: 80 BPM | OXYGEN SATURATION: 94 % | BODY MASS INDEX: 37.93 KG/M2 | RESPIRATION RATE: 20 BRPM | DIASTOLIC BLOOD PRESSURE: 76 MMHG | HEIGHT: 72 IN

## 2023-12-06 DIAGNOSIS — J06.9 UPPER RESPIRATORY TRACT INFECTION, UNSPECIFIED TYPE: ICD-10-CM

## 2023-12-06 DIAGNOSIS — H57.89 EYE DRAINAGE: Primary | ICD-10-CM

## 2023-12-06 PROCEDURE — 99212 OFFICE O/P EST SF 10 MIN: CPT | Performed by: EMERGENCY MEDICINE

## 2023-12-06 ASSESSMENT — ENCOUNTER SYMPTOMS
EYE DISCHARGE: 1
COUGH: 1
EYE ITCHING: 0
EYE REDNESS: 0
EYE PAIN: 0
SHORTNESS OF BREATH: 0

## 2023-12-06 ASSESSMENT — PAIN - FUNCTIONAL ASSESSMENT: PAIN_FUNCTIONAL_ASSESSMENT: NONE - DENIES PAIN

## 2023-12-06 NOTE — ED PROVIDER NOTES
[address] [facility]  Urgent Care Encounter       CHIEF COMPLAINT       Chief Complaint   Patient presents with    Eye Drainage       Nurses Notes reviewed and I agree except as noted in the HPI. HISTORY OF PRESENT ILLNESS   Jakub Burger is a 77 y.o. male who presents for complaints of drainage from bilateral eyes. Patient states that he came to the urgent care 2 days ago for upper respiratory infection. He is placed on antibiotic and a steroid. Patient forgot to tell the provider that he has had problems with eye drainage and crustiness. Patient states his symptoms seem to be doing well but when he woke up this morning his eyes were crusted shut. Currently denies any pain or redness to the eyes. No drainage. No photophobia or visual loss. HPI    REVIEW OF SYSTEMS     Review of Systems   Constitutional:  Negative for activity change, fatigue and fever. Eyes:  Positive for discharge. Negative for pain, redness, itching and visual disturbance. Respiratory:  Positive for cough. Negative for shortness of breath. PAST MEDICAL HISTORY         Diagnosis Date    Cancer [facility])     prostate    Hypertension, essential     Obesity (BMI 30-39. 9)     Psoriasis        SURGICALHISTORY     Patient  has a past surgical history that includes Knee arthroscopy; Ulnar tunnel release; Prostate biopsy (2021); and Prostatectomy (Bilateral, 12/2/2021).     CURRENT MEDICATIONS       Discharge Medication List as of 12/6/2023 12:19 PM        CONTINUE these medications which have NOT CHANGED    Details   predniSONE (DELTASONE) 20 MG tablet Take 2 tablets by mouth daily for 7 days, Disp-14 tablet, R-0Normal      Dextromethorphan-guaiFENesin (CORICIDIN HBP CONGESTION/COUGH)  MG CAPS Take 1 capsule by mouth every 6 hours as needed (cough/congestion), Disp-60 capsule, R-0Normal      azithromycin (ZITHROMAX) 250 MG tablet Take 1 tablet by mouth See Admin Instructions for 5 days 500mg on day 1 followed by

## 2023-12-06 NOTE — DISCHARGE INSTRUCTIONS
Continue medications from previous visit    Warm compresses as needed    Return for new or worsening symptoms

## 2023-12-25 DIAGNOSIS — I10 HYPERTENSION, ESSENTIAL: Chronic | ICD-10-CM

## 2023-12-25 DIAGNOSIS — I10 HYPERTENSION, UNSPECIFIED TYPE: ICD-10-CM

## 2023-12-26 RX ORDER — AMLODIPINE BESYLATE 10 MG/1
10 TABLET ORAL DAILY
Qty: 90 TABLET | Refills: 0 | Status: SHIPPED | OUTPATIENT
Start: 2023-12-26

## 2023-12-26 RX ORDER — LISINOPRIL 30 MG/1
30 TABLET ORAL DAILY
Qty: 90 TABLET | Refills: 0 | Status: SHIPPED | OUTPATIENT
Start: 2023-12-26

## 2023-12-26 NOTE — TELEPHONE ENCOUNTER
Recent Visits  Date Type Provider Dept   01/18/23 Office Visit Elwyn Moritz, DO [facility]   Showing recent visits within past 540 days with a meds authorizing provider and meeting all other requirements  Future Appointments  No visits were found meeting these conditions.   Showing future appointments within next 150 days with a meds authorizing provider and meeting all other requirements

## 2024-01-05 DIAGNOSIS — I10 HYPERTENSION, UNSPECIFIED TYPE: ICD-10-CM

## 2024-01-05 DIAGNOSIS — I10 HYPERTENSION, ESSENTIAL: Chronic | ICD-10-CM

## 2024-01-08 RX ORDER — AMLODIPINE BESYLATE 10 MG/1
10 TABLET ORAL DAILY
Qty: 30 TABLET | Refills: 2 | Status: SHIPPED | OUTPATIENT
Start: 2024-01-08

## 2024-01-08 RX ORDER — LISINOPRIL 30 MG/1
30 TABLET ORAL DAILY
Qty: 30 TABLET | Refills: 2 | Status: SHIPPED | OUTPATIENT
Start: 2024-01-08

## 2024-01-08 NOTE — TELEPHONE ENCOUNTER
Future Appointments   Date Time Provider Department Center   7/25/2024  2:30 PM Mady Remy, JENNY - CNP [facility]

## 2024-02-01 ENCOUNTER — HOSPITAL ENCOUNTER (EMERGENCY)
Age: 69
Discharge: HOME | End: 2024-02-01
Payer: MEDICARE

## 2024-02-01 VITALS
WEIGHT: 282 LBS | TEMPERATURE: 97.8 F | RESPIRATION RATE: 16 BRPM | OXYGEN SATURATION: 96 % | BODY MASS INDEX: 38.25 KG/M2 | DIASTOLIC BLOOD PRESSURE: 80 MMHG | SYSTOLIC BLOOD PRESSURE: 121 MMHG | HEART RATE: 77 BPM

## 2024-02-01 DIAGNOSIS — J01.90 ACUTE NON-RECURRENT SINUSITIS, UNSPECIFIED LOCATION: Primary | ICD-10-CM

## 2024-02-01 PROCEDURE — 99213 OFFICE O/P EST LOW 20 MIN: CPT | Performed by: NURSE PRACTITIONER

## 2024-02-01 PROCEDURE — 99213 OFFICE O/P EST LOW 20 MIN: CPT

## 2024-02-01 RX ORDER — AZITHROMYCIN 250 MG/1
TABLET, FILM COATED ORAL
Qty: 1 PACKET | Refills: 0 | Status: SHIPPED | OUTPATIENT
Start: 2024-02-01 | End: 2024-02-05

## 2024-02-01 RX ORDER — FLUTICASONE PROPIONATE 50 MCG
2 SPRAY, SUSPENSION (ML) NASAL DAILY
Qty: 16 G | Refills: 0 | Status: SHIPPED | OUTPATIENT
Start: 2024-02-01

## 2024-02-01 RX ORDER — GUAIFENESIN AND PSEUDOEPHEDRINE HCL 1200; 120 MG/1; MG/1
1 TABLET, EXTENDED RELEASE ORAL 2 TIMES DAILY PRN
Qty: 20 TABLET | Refills: 0 | Status: SHIPPED | OUTPATIENT
Start: 2024-02-01

## 2024-02-01 ASSESSMENT — ENCOUNTER SYMPTOMS
SHORTNESS OF BREATH: 0
COUGH: 1
SINUS PRESSURE: 1
CHEST TIGHTNESS: 1
SORE THROAT: 0

## 2024-02-01 ASSESSMENT — PAIN - FUNCTIONAL ASSESSMENT
PAIN_FUNCTIONAL_ASSESSMENT: 0-10
PAIN_FUNCTIONAL_ASSESSMENT: ACTIVITIES ARE NOT PREVENTED

## 2024-02-01 ASSESSMENT — PAIN DESCRIPTION - FREQUENCY: FREQUENCY: INTERMITTENT

## 2024-02-01 ASSESSMENT — PAIN DESCRIPTION - LOCATION: LOCATION: FACE

## 2024-02-01 ASSESSMENT — PAIN SCALES - GENERAL: PAINLEVEL_OUTOF10: 7

## 2024-02-01 ASSESSMENT — PAIN DESCRIPTION - PAIN TYPE: TYPE: ACUTE PAIN

## 2024-02-01 NOTE — ED PROVIDER NOTES
[facility] URGENT CARE  Urgent Care Encounter       CHIEF COMPLAINT       Chief Complaint   Patient presents with    sinus pressure       Nurses Notes reviewed and I agree except as noted in the HPI.  HISTORY OF PRESENT ILLNESS   Polo Mariano is a 68 y.o. male who presents with complaints of sinus pressure, nasal congestion, chest tightness, and cough.  This is a new problem that started 1 week ago.  Patient reports a history of sinus infections over the past 2 months.  He denies any fever.  Has tried over-the-counter antipyretics only.  There has been no relief.  Denies any exposure to illness.    The history is provided by the patient.       REVIEW OF SYSTEMS     Review of Systems   Constitutional:  Negative for fever.   HENT:  Positive for congestion and sinus pressure. Negative for sore throat.    Respiratory:  Positive for cough and chest tightness. Negative for shortness of breath.    Cardiovascular:  Negative for chest pain.   Musculoskeletal:  Negative for myalgias.   Neurological:  Negative for headaches.       PAST MEDICAL HISTORY         Diagnosis Date    Cancer ([facility])     prostate    Hypertension, essential     Obesity (BMI 30-39.9)     Psoriasis        SURGICALHISTORY     Patient  has a past surgical history that includes Knee arthroscopy; Ulnar tunnel release; Prostate biopsy (2021); and Prostatectomy (Bilateral, 12/2/2021).    CURRENT MEDICATIONS       Discharge Medication List as of 2/1/2024  9:51 AM        CONTINUE these medications which have NOT CHANGED    Details   lisinopril (PRINIVIL;ZESTRIL) 30 MG tablet Take 1 tablet by mouth daily, Disp-30 tablet, R-2Normal      amLODIPine (NORVASC) 10 MG tablet Take 1 tablet by mouth daily, Disp-30 tablet, R-2Normal             ALLERGIES     Patient is has No Known Allergies.    Patients   Immunization History   Administered Date(s) Administered    COVID-19, MODERNA BLUE border, Primary or Immunocompromised, (age 12y+), IM, 100 mcg/0.5mL  Follow up with your OB/gyn in 48 hours for re-evaluation and further treatment.    Abdominal Pain    Many things can cause abdominal pain. Usually, abdominal pain is not caused by a disease and will improve without treatment. Your health care provider will do a physical exam and possibly order blood tests and imaging to help determine the seriousness of your pain. However, in many cases, no cause may be found and you may need further testing as an outpatient. Monitor your abdominal pain for any changes.     SEEK IMMEDIATE MEDICAL CARE IF YOU HAVE THE FOLLOWING SYMPTOMS: worsening abdominal pain, vomiting, diarrhea, inability to have bowel movements or pass gas, black or bloody stool, fever accompanying chest pain or back pain, or dizziness/lightheadedness.

## 2024-02-01 NOTE — ED TRIAGE NOTES
Polo arrives to room with complaint of headache, sinus pain, sore throat, non productive cough symptoms started 1 weeks ago.      Work note

## 2024-03-08 ENCOUNTER — OFFICE VISIT (OUTPATIENT)
Dept: FAMILY MEDICINE CLINIC | Age: 69
End: 2024-03-08

## 2024-03-08 VITALS
OXYGEN SATURATION: 98 % | HEART RATE: 81 BPM | TEMPERATURE: 97.7 F | DIASTOLIC BLOOD PRESSURE: 70 MMHG | HEIGHT: 72 IN | BODY MASS INDEX: 38.19 KG/M2 | RESPIRATION RATE: 18 BRPM | SYSTOLIC BLOOD PRESSURE: 136 MMHG | WEIGHT: 282 LBS

## 2024-03-08 DIAGNOSIS — I10 HYPERTENSION, ESSENTIAL: ICD-10-CM

## 2024-03-08 DIAGNOSIS — Z23 NEED FOR VACCINATION: ICD-10-CM

## 2024-03-08 DIAGNOSIS — H81.13 BENIGN PAROXYSMAL POSITIONAL VERTIGO DUE TO BILATERAL VESTIBULAR DISORDER: Primary | ICD-10-CM

## 2024-03-08 DIAGNOSIS — E66.9 OBESITY (BMI 30-39.9): ICD-10-CM

## 2024-03-08 PROBLEM — Z85.46 HISTORY OF PROSTATE CANCER: Chronic | Status: ACTIVE | Noted: 2021-10-18

## 2024-03-08 RX ORDER — MECLIZINE HYDROCHLORIDE 25 MG/1
25 TABLET ORAL 3 TIMES DAILY PRN
Qty: 30 TABLET | Refills: 0 | Status: SHIPPED | OUTPATIENT
Start: 2024-03-08 | End: 2024-03-18

## 2024-03-08 SDOH — ECONOMIC STABILITY: FOOD INSECURITY: WITHIN THE PAST 12 MONTHS, THE FOOD YOU BOUGHT JUST DIDN'T LAST AND YOU DIDN'T HAVE MONEY TO GET MORE.: NEVER TRUE

## 2024-03-08 SDOH — ECONOMIC STABILITY: INCOME INSECURITY: HOW HARD IS IT FOR YOU TO PAY FOR THE VERY BASICS LIKE FOOD, HOUSING, MEDICAL CARE, AND HEATING?: NOT HARD AT ALL

## 2024-03-08 SDOH — ECONOMIC STABILITY: FOOD INSECURITY: WITHIN THE PAST 12 MONTHS, YOU WORRIED THAT YOUR FOOD WOULD RUN OUT BEFORE YOU GOT MONEY TO BUY MORE.: NEVER TRUE

## 2024-03-08 SDOH — ECONOMIC STABILITY: HOUSING INSECURITY
IN THE LAST 12 MONTHS, WAS THERE A TIME WHEN YOU DID NOT HAVE A STEADY PLACE TO SLEEP OR SLEPT IN A SHELTER (INCLUDING NOW)?: NO

## 2024-03-08 ASSESSMENT — PATIENT HEALTH QUESTIONNAIRE - PHQ9
SUM OF ALL RESPONSES TO PHQ QUESTIONS 1-9: 0
1. LITTLE INTEREST OR PLEASURE IN DOING THINGS: 0
SUM OF ALL RESPONSES TO PHQ9 QUESTIONS 1 & 2: 0
2. FEELING DOWN, DEPRESSED OR HOPELESS: 0
SUM OF ALL RESPONSES TO PHQ QUESTIONS 1-9: 0

## 2024-03-08 NOTE — PATIENT INSTRUCTIONS
LAB INSTRUCTIONS:    Please complete labs within 4 week(s).    Please fast for 8 hours prior to lab collection.    The clinic will call you within 1 week of collection. If you have not heard from us within that amount of time, please call us at [phone].

## 2024-03-08 NOTE — PROGRESS NOTES
Immunization(s) given during visit:    Immunizations Administered       Name Date Dose Route    Influenza, FLUAD, (age 65 y+), Adjuvanted, 0.5mL 3/8/2024 0.5 mL Intramuscular    Site: Deltoid- Left    Lot: [id number]    NDC: 83960-065-38    Pneumococcal, PCV20, PREVNAR 20, (age 6w+), IM, 0.5mL 3/8/2024 0.5 mL Intramuscular    Site: Deltoid- Left    Lot: [id number]    NDC: 0388-1191-61            Most recent Vaccine Information Sheet dated 5.12.2023, 8.6.2021 given to pt    Vaccine Information Sheet, \"Influenza - Inactivated\"  given to Polo Mariano, or parent/legal guardian of  Polo Mariano and verbalized understanding.    Patient responses:    Have you ever had a reaction to a flu vaccine? No  Do you have an allergy to eggs, neomycin or polymixin?  No  Do you have an allergy to Thimerosal, contact lens solution, or Merthiolate? No  Have you ever had [place] Syndrome?  No  Do you have any current illness?  No  Do you have a temperature above 100 degrees? No  Are you pregnant? No  If pregnant, permission obtained from physician? No  Do you have an active neurological disorder? No      Flu vaccine given per order. Please see immunization tab.     Patient verbalized ok to give both vaccinations in left deltoid   
throat  Cardiovascular:  Chest Pain, Palpitations, Orthopnea, Paroxysmal Nocturnal Dyspnea  Respiratory:  Cough, Wheezing, Shortness of breath, Chest tightness, Apnea  Gastrointestinal:  Nausea, Vomiting, Diarrhea, Constipation, Heartburn, Blood in stool  Genitourinary:  Difficulty or painful urination, Flank pain, Change in frequency, Urgency  Skin:  Color change, Rash, Itching, Wound  Musculoskeletal:  Joint pain, Back pain, Gait problems, Joint swelling, Myalgias  Neurological:  Dizziness, Headaches, Presyncope, Numbness, Seizures, Tremors  Endocrine:  Heat Intolerance, Cold Intolerance, Polydipsia, Polyphagia, Polyuria      PHYSICAL EXAM:  Vitals:    03/08/24 1029   BP: 136/70   Pulse: 81   Resp: 18   Temp: 97.7 °F (36.5 °C)   TempSrc: Temporal   SpO2: 98%   Weight: 127.9 kg (282 lb)   Height: 1.829 m (6' 0.01\")     Body mass index is 38.24 kg/m².       VS Reviewed  General Appearance: A&O x 3, No acute distress,well developed and well- nourished  Eyes: pupils equal, round, and reactive to light, extraocular eye movements intact, conjunctivae and eye lids without erythema  ENT: external ear and ear canal clear bilaterally, TMs intact and regular, nose without deformity, nasal mucosa and turbinates normal without polyps, oropharynx normal, dentition is normal for age  Neck: supple and non-tender without mass, no thyromegaly or thyroid nodules, no cervical lymphadenopathy  Pulmonary/Chest: clear to auscultation bilaterally- no wheezes, rales or rhonchi, normal air movement, no respiratory distress or retractions  Cardiovascular: S1 and S2 auscultated w/ RRR. No murmurs, rubs, clicks, or gallops, distal pulses intact.  Abdomen: soft, non-tender, non-distended, bowel sounds physiologic,  no rebound or guarding, no masses or hernias noted. Liver and spleen without enlargement.   Extremities: no cyanosis, clubbing or edema of the lower extremities.   Skin: warm and dry, no rash or erythema  Neuro Exam:   Cranial

## 2024-03-27 ENCOUNTER — APPOINTMENT (OUTPATIENT)
Dept: GENERAL RADIOLOGY | Age: 69
End: 2024-03-27
Payer: MEDICARE

## 2024-03-27 ENCOUNTER — APPOINTMENT (OUTPATIENT)
Dept: CT IMAGING | Age: 69
End: 2024-03-27
Payer: MEDICARE

## 2024-03-27 ENCOUNTER — HOSPITAL ENCOUNTER (EMERGENCY)
Age: 69
Discharge: HOME | End: 2024-03-27
Attending: STUDENT IN AN ORGANIZED HEALTH CARE EDUCATION/TRAINING PROGRAM
Payer: MEDICARE

## 2024-03-27 VITALS
OXYGEN SATURATION: 96 % | HEIGHT: 72 IN | SYSTOLIC BLOOD PRESSURE: 131 MMHG | WEIGHT: 285 LBS | TEMPERATURE: 97.5 F | BODY MASS INDEX: 38.6 KG/M2 | RESPIRATION RATE: 20 BRPM | DIASTOLIC BLOOD PRESSURE: 90 MMHG | HEART RATE: 75 BPM

## 2024-03-27 DIAGNOSIS — J32.0 CHRONIC MAXILLARY SINUSITIS: ICD-10-CM

## 2024-03-27 DIAGNOSIS — R42 DIZZINESS: Primary | ICD-10-CM

## 2024-03-27 DIAGNOSIS — N30.01 ACUTE CYSTITIS WITH HEMATURIA: ICD-10-CM

## 2024-03-27 LAB
ALBUMIN SERPL BCG-MCNC: 4.4 G/DL (ref 3.5–5.1)
ALP SERPL-CCNC: 59 U/L (ref 38–126)
ALT SERPL W/O P-5'-P-CCNC: 23 U/L (ref 11–66)
ANION GAP SERPL CALC-SCNC: 10 MEQ/L (ref 8–16)
AST SERPL-CCNC: 17 U/L (ref 5–40)
BACTERIA URNS QL MICRO: ABNORMAL /HPF
BASOPHILS ABSOLUTE: 0.1 THOU/MM3 (ref 0–0.1)
BASOPHILS NFR BLD AUTO: 1.7 %
BILIRUB CONJ SERPL-MCNC: < 0.2 MG/DL (ref 0–0.3)
BILIRUB SERPL-MCNC: 0.6 MG/DL (ref 0.3–1.2)
BILIRUB UR QL STRIP.AUTO: NEGATIVE
BUN SERPL-MCNC: 18 MG/DL (ref 7–22)
CALCIUM SERPL-MCNC: 8.9 MG/DL (ref 8.5–10.5)
CASTS #/AREA URNS LPF: ABNORMAL /LPF
CASTS 2: ABNORMAL /LPF
CHARACTER UR: CLEAR
CHLORIDE SERPL-SCNC: 103 MEQ/L (ref 98–111)
CO2 SERPL-SCNC: 22 MEQ/L (ref 23–33)
COLOR: YELLOW
CREAT SERPL-MCNC: 0.7 MG/DL (ref 0.4–1.2)
CRYSTALS URNS MICRO: ABNORMAL
DEPRECATED RDW RBC AUTO: 44.1 FL (ref 35–45)
EOSINOPHIL NFR BLD AUTO: 4.3 %
EOSINOPHILS ABSOLUTE: 0.2 THOU/MM3 (ref 0–0.4)
EPITHELIAL CELLS, UA: ABNORMAL /HPF
ERYTHROCYTE [DISTWIDTH] IN BLOOD BY AUTOMATED COUNT: 13.8 % (ref 11.5–14.5)
FLUAV RNA RESP QL NAA+PROBE: NOT DETECTED
FLUBV RNA RESP QL NAA+PROBE: NOT DETECTED
GFR SERPL CREATININE-BSD FRML MDRD: > 90 ML/MIN/1.73M2
GLUCOSE SERPL-MCNC: 95 MG/DL (ref 70–108)
GLUCOSE UR QL STRIP.AUTO: NEGATIVE MG/DL
HCT VFR BLD AUTO: 44.6 % (ref 42–52)
HGB BLD-MCNC: 15.7 GM/DL (ref 14–18)
HGB UR QL STRIP.AUTO: ABNORMAL
IMM GRANULOCYTES # BLD AUTO: 0.03 THOU/MM3 (ref 0–0.07)
IMM GRANULOCYTES NFR BLD AUTO: 0.5 %
KETONES UR QL STRIP.AUTO: NEGATIVE
LYMPHOCYTES ABSOLUTE: 2.1 THOU/MM3 (ref 1–4.8)
LYMPHOCYTES NFR BLD AUTO: 35.9 %
MCH RBC QN AUTO: 31.2 PG (ref 26–33)
MCHC RBC AUTO-ENTMCNC: 35.2 GM/DL (ref 32.2–35.5)
MCV RBC AUTO: 88.5 FL (ref 80–94)
MISCELLANEOUS 2: ABNORMAL
MONOCYTES ABSOLUTE: 0.6 THOU/MM3 (ref 0.4–1.3)
MONOCYTES NFR BLD AUTO: 10.9 %
NEUTROPHILS NFR BLD AUTO: 46.7 %
NITRITE UR QL STRIP: POSITIVE
NRBC BLD AUTO-RTO: 0 /100 WBC
OSMOLALITY SERPL CALC.SUM OF ELEC: 271.8 MOSMOL/KG (ref 275–300)
PH UR STRIP.AUTO: 5.5 [PH] (ref 5–9)
PLATELET # BLD AUTO: 316 THOU/MM3 (ref 130–400)
PMV BLD AUTO: 9.8 FL (ref 9.4–12.4)
POTASSIUM SERPL-SCNC: 4.5 MEQ/L (ref 3.5–5.2)
PROT SERPL-MCNC: 7.7 G/DL (ref 6.1–8)
PROT UR STRIP.AUTO-MCNC: NEGATIVE MG/DL
RBC # BLD AUTO: 5.04 MILL/MM3 (ref 4.7–6.1)
RBC URINE: ABNORMAL /HPF
RENAL EPI CELLS #/AREA URNS HPF: ABNORMAL /[HPF]
SARS-COV-2 RNA RESP QL NAA+PROBE: NOT DETECTED
SEGMENTED NEUTROPHILS ABSOLUTE COUNT: 2.7 THOU/MM3 (ref 1.8–7.7)
SODIUM SERPL-SCNC: 135 MEQ/L (ref 135–145)
SP GR UR REFRACT.AUTO: 1.01 (ref 1–1.03)
TROPONIN, HIGH SENSITIVITY: 11 NG/L (ref 0–12)
UROBILINOGEN, URINE: 0.2 EU/DL (ref 0–1)
WBC # BLD AUTO: 5.8 THOU/MM3 (ref 4.8–10.8)
WBC #/AREA URNS HPF: ABNORMAL /HPF
WBC #/AREA URNS HPF: ABNORMAL /[HPF]
YEAST LIKE FUNGI URNS QL MICRO: ABNORMAL

## 2024-03-27 PROCEDURE — 71045 X-RAY EXAM CHEST 1 VIEW: CPT

## 2024-03-27 PROCEDURE — 99285 EMERGENCY DEPT VISIT HI MDM: CPT

## 2024-03-27 PROCEDURE — 82248 BILIRUBIN DIRECT: CPT

## 2024-03-27 PROCEDURE — 80053 COMPREHEN METABOLIC PANEL: CPT

## 2024-03-27 PROCEDURE — 87077 CULTURE AEROBIC IDENTIFY: CPT

## 2024-03-27 PROCEDURE — 85025 COMPLETE CBC W/AUTO DIFF WBC: CPT

## 2024-03-27 PROCEDURE — 93010 ELECTROCARDIOGRAM REPORT: CPT | Performed by: INTERNAL MEDICINE

## 2024-03-27 PROCEDURE — 84484 ASSAY OF TROPONIN QUANT: CPT

## 2024-03-27 PROCEDURE — 70450 CT HEAD/BRAIN W/O DYE: CPT

## 2024-03-27 PROCEDURE — 81001 URINALYSIS AUTO W/SCOPE: CPT

## 2024-03-27 PROCEDURE — 87636 SARSCOV2 & INF A&B AMP PRB: CPT

## 2024-03-27 PROCEDURE — 87086 URINE CULTURE/COLONY COUNT: CPT

## 2024-03-27 PROCEDURE — 87186 SC STD MICRODIL/AGAR DIL: CPT

## 2024-03-27 PROCEDURE — 93005 ELECTROCARDIOGRAM TRACING: CPT | Performed by: STUDENT IN AN ORGANIZED HEALTH CARE EDUCATION/TRAINING PROGRAM

## 2024-03-27 PROCEDURE — 36415 COLL VENOUS BLD VENIPUNCTURE: CPT

## 2024-03-27 RX ORDER — AMOXICILLIN AND CLAVULANATE POTASSIUM 875; 125 MG/1; MG/1
1 TABLET, FILM COATED ORAL 2 TIMES DAILY
Qty: 20 TABLET | Refills: 0 | Status: SHIPPED | OUTPATIENT
Start: 2024-03-27 | End: 2024-04-06

## 2024-03-27 ASSESSMENT — PAIN - FUNCTIONAL ASSESSMENT
PAIN_FUNCTIONAL_ASSESSMENT: NONE - DENIES PAIN

## 2024-03-27 NOTE — DISCHARGE INSTRUCTIONS
Today you were seen for a month of dizziness and sinus pressure.  At this time you are stable for discharge.  As you have had the sinus pressure this long, we will treat it with antibiotics.  Take antibiotics as prescribed.  Follow-up with your primary care doctor soon as possible.  Return to ED if any worsening of dizziness.  Your CT head showed no acute findings.  Your labs were reassuring.

## 2024-03-27 NOTE — ED TRIAGE NOTES
Pt presents to the ED through intake. Pt is having dizziness and nasal congestion ongoing for a month. Pt states the dizziness is worse upon standing has worsened in the last week. Pt was seen at  about 3 weeks ago for the same symptoms. Pt denies any cardiac history. EKG obtained upon arrival. Vitals stable with telemetry in place.

## 2024-03-27 NOTE — ED PROVIDER NOTES
[facility] EMERGENCY DEPT      EMERGENCY MEDICINE     Pt Name: Polo Mariano  MRN: [id number]  Birthdate 1955  Date of evaluation: 3/27/2024  Provider: Ellen Etienne MD    CHIEF COMPLAINT       Chief Complaint   Patient presents with    Dizziness    Nasal Congestion     History obtained from the patient.  HISTORY OF PRESENT ILLNESS   Polo Mariano is a pleasant 68 y.o. male who presents to the emergency department from from home, by private vehicle for evaluation of dizziness and sinus pressure for a month and a half.  Patient states that over the past month and a half, he has noticed that when he stands up he sometimes gets dizzy for a second or 2.  Denies any chest pain, change in vision, weakness, numbness, tingling that is associated.  Denies any shortness of breath, fever, chills, headache, neck pain.  States that he has had maxillary sinus pressure for this entire time as well.  States the pain is 2 out of 10, worse with head movements, palpation.  Having congestion with this, no rhinorrhea.  Denies any additional complaints.        Pertinent ROS included above.  PASTMEDICAL HISTORY     Past Medical History:   Diagnosis Date    History of prostate cancer 10/18/2021    Hypertension, essential     Obesity (BMI 30-39.9)     Psoriasis        Patient Active Problem List   Diagnosis Code    Hypertension, essential I10    Obesity (BMI 30-39.9) E66.9    Psoriasis L40.9    History of prostate cancer Z85.46     SURGICAL HISTORY       Past Surgical History:   Procedure Laterality Date    KNEE ARTHROSCOPY      right    PROSTATE BIOPSY  2021    PROSTATECTOMY Bilateral 12/2/2021    ROBOTIC ASSISTED LAPAROSCOPIC RADICAL PROSTECTOMY WITH BILATERAL LYMPH NODE DISSECTION performed by Phillip Villar MD at [facility] OR    ULNAR TUNNEL RELEASE         CURRENT MEDICATIONS       Discharge Medication List as of 3/27/2024  1:36 PM        CONTINUE these medications which have NOT CHANGED    Details   lisinopril

## 2024-03-27 NOTE — ED NOTES
Pt resting on cot. Updated on POC. Verbalized understanding. Denies pain. Vitals stable with telemetry in place. Call light in reach

## 2024-03-28 LAB
BACTERIA UR CULT: ABNORMAL
ORGANISM: ABNORMAL

## 2024-03-29 LAB
EKG ATRIAL RATE: 72 BPM
EKG P AXIS: 59 DEGREES
EKG P-R INTERVAL: 170 MS
EKG Q-T INTERVAL: 368 MS
EKG QRS DURATION: 80 MS
EKG QTC CALCULATION (BAZETT): 402 MS
EKG R AXIS: 55 DEGREES
EKG T AXIS: 67 DEGREES
EKG VENTRICULAR RATE: 72 BPM

## 2024-04-10 ENCOUNTER — NURSE ONLY (OUTPATIENT)
Dept: LAB | Age: 69
End: 2024-04-10

## 2024-04-10 DIAGNOSIS — I10 HYPERTENSION, ESSENTIAL: ICD-10-CM

## 2024-04-10 LAB
ALBUMIN SERPL BCG-MCNC: 4.2 G/DL (ref 3.5–5.1)
ALP SERPL-CCNC: 61 U/L (ref 38–126)
ALT SERPL W/O P-5'-P-CCNC: 21 U/L (ref 11–66)
ANION GAP SERPL CALC-SCNC: 11 MEQ/L (ref 8–16)
AST SERPL-CCNC: 16 U/L (ref 5–40)
BASOPHILS ABSOLUTE: 0.1 THOU/MM3 (ref 0–0.1)
BASOPHILS NFR BLD AUTO: 1.2 %
BILIRUB SERPL-MCNC: 0.4 MG/DL (ref 0.3–1.2)
BUN SERPL-MCNC: 19 MG/DL (ref 7–22)
CALCIUM SERPL-MCNC: 9.1 MG/DL (ref 8.5–10.5)
CHLORIDE SERPL-SCNC: 104 MEQ/L (ref 98–111)
CHOLEST SERPL-MCNC: 180 MG/DL (ref 100–199)
CO2 SERPL-SCNC: 23 MEQ/L (ref 23–33)
CREAT SERPL-MCNC: 0.8 MG/DL (ref 0.4–1.2)
CREAT UR-MCNC: 258.2 MG/DL
DEPRECATED RDW RBC AUTO: 43.7 FL (ref 35–45)
EOSINOPHIL NFR BLD AUTO: 3.7 %
EOSINOPHILS ABSOLUTE: 0.2 THOU/MM3 (ref 0–0.4)
ERYTHROCYTE [DISTWIDTH] IN BLOOD BY AUTOMATED COUNT: 13.8 % (ref 11.5–14.5)
GFR SERPL CREATININE-BSD FRML MDRD: > 90 ML/MIN/1.73M2
GLUCOSE SERPL-MCNC: 102 MG/DL (ref 70–108)
HCT VFR BLD AUTO: 44.5 % (ref 42–52)
HDLC SERPL-MCNC: 38 MG/DL
HGB BLD-MCNC: 14.9 GM/DL (ref 14–18)
IMM GRANULOCYTES # BLD AUTO: 0.04 THOU/MM3 (ref 0–0.07)
IMM GRANULOCYTES NFR BLD AUTO: 0.6 %
LDLC SERPL CALC-MCNC: 111 MG/DL
LYMPHOCYTES ABSOLUTE: 2.3 THOU/MM3 (ref 1–4.8)
LYMPHOCYTES NFR BLD AUTO: 35.3 %
MCH RBC QN AUTO: 29.7 PG (ref 26–33)
MCHC RBC AUTO-ENTMCNC: 33.5 GM/DL (ref 32.2–35.5)
MCV RBC AUTO: 88.8 FL (ref 80–94)
MICROALBUMIN UR-MCNC: 3.46 MG/DL
MICROALBUMIN/CREAT RATIO PNL UR: 13 MG/G (ref 0–30)
MONOCYTES ABSOLUTE: 0.6 THOU/MM3 (ref 0.4–1.3)
MONOCYTES NFR BLD AUTO: 9.7 %
NEUTROPHILS NFR BLD AUTO: 49.5 %
NRBC BLD AUTO-RTO: 0 /100 WBC
PLATELET # BLD AUTO: 273 THOU/MM3 (ref 130–400)
PMV BLD AUTO: 10.1 FL (ref 9.4–12.4)
POTASSIUM SERPL-SCNC: 4.4 MEQ/L (ref 3.5–5.2)
PROT SERPL-MCNC: 7.7 G/DL (ref 6.1–8)
RBC # BLD AUTO: 5.01 MILL/MM3 (ref 4.7–6.1)
SEGMENTED NEUTROPHILS ABSOLUTE COUNT: 3.2 THOU/MM3 (ref 1.8–7.7)
SODIUM SERPL-SCNC: 138 MEQ/L (ref 135–145)
T4 FREE SERPL-MCNC: 0.96 NG/DL (ref 0.93–1.68)
TRIGL SERPL-MCNC: 156 MG/DL (ref 0–199)
TSH SERPL DL<=0.005 MIU/L-ACNC: 4.82 UIU/ML (ref 0.4–4.2)
WBC # BLD AUTO: 6.4 THOU/MM3 (ref 4.8–10.8)

## 2024-04-11 ENCOUNTER — TELEPHONE (OUTPATIENT)
Dept: FAMILY MEDICINE CLINIC | Age: 69
End: 2024-04-11

## 2024-04-11 NOTE — TELEPHONE ENCOUNTER
----- Message from Doug Goldsmith, DO sent at 4/11/2024  6:15 AM EDT -----  Please let pt know that labs are stable and appropriate. Let me know if questions, thanks!

## 2024-04-11 NOTE — TELEPHONE ENCOUNTER
Left message on answering machine requesting pt to call back at earliest convenience.     Per HIPAA does NOT want a detailed message left

## 2024-04-15 PROBLEM — E78.5 DYSLIPIDEMIA: Chronic | Status: ACTIVE | Noted: 2024-04-15

## 2024-04-15 NOTE — PROGRESS NOTES
history and I have made updates where appropriate.      Review of Systems  Positive responses are highlighted in bold    Constitutional:  Fever, Chills, Night Sweats, Fatigue, Unexpected changes in weight  HENT:  Ear pain, Tinnitus, Nosebleeds, Trouble swallowing, Hearing loss, Sore throat  Cardiovascular:  Chest Pain, Palpitations, Orthopnea, Paroxysmal Nocturnal Dyspnea  Respiratory:  Cough, Wheezing, Shortness of breath, Chest tightness, Apnea  Gastrointestinal:  Nausea, Vomiting, Diarrhea, Constipation, Heartburn, Blood in stool  Genitourinary:  Difficulty or painful urination, Flank pain, Change in frequency, Urgency  Skin:  Color change, Rash, Itching, Wound  Musculoskeletal:  Joint pain, Back pain, Gait problems, Joint swelling, Myalgias  Neurological:  Dizziness, Headaches, Presyncope, Numbness, Seizures, Tremors  Endocrine:  Heat Intolerance, Cold Intolerance, Polydipsia, Polyphagia, Polyuria      PHYSICAL EXAM:  Vitals:    04/16/24 0927   BP: 130/78   Pulse: 82   Resp: 18   Temp: 97.9 °F (36.6 °C)   TempSrc: Temporal   SpO2: 98%   Weight: 128.2 kg (282 lb 9.6 oz)   Height: 1.829 m (6' 0.01\")     Body mass index is 38.32 kg/m².       VS Reviewed  General Appearance: A&O x 3, No acute distress,well developed and well- nourished  Eyes: pupils equal, round, and reactive to light, extraocular eye movements intact, conjunctivae and eye lids without erythema  ENT: external ear and ear canal clear bilaterally, TMs intact and regular, nose without deformity, nasal mucosa and turbinates normal without polyps, oropharynx normal, dentition is normal for age  Neck: supple and non-tender without mass, no thyromegaly or thyroid nodules, no cervical lymphadenopathy  Pulmonary/Chest: clear to auscultation bilaterally- no wheezes, rales or rhonchi, normal air movement, no respiratory distress or retractions  Cardiovascular: S1 and S2 auscultated w/ RRR. No murmurs, rubs, clicks, or gallops, distal pulses intact.  Abdomen:

## 2024-04-16 ENCOUNTER — OFFICE VISIT (OUTPATIENT)
Dept: FAMILY MEDICINE CLINIC | Age: 69
End: 2024-04-16

## 2024-04-16 VITALS
HEART RATE: 82 BPM | DIASTOLIC BLOOD PRESSURE: 78 MMHG | OXYGEN SATURATION: 98 % | SYSTOLIC BLOOD PRESSURE: 130 MMHG | HEIGHT: 72 IN | TEMPERATURE: 97.9 F | RESPIRATION RATE: 18 BRPM | WEIGHT: 282.6 LBS | BODY MASS INDEX: 38.28 KG/M2

## 2024-04-16 DIAGNOSIS — H81.13 BENIGN PAROXYSMAL POSITIONAL VERTIGO DUE TO BILATERAL VESTIBULAR DISORDER: ICD-10-CM

## 2024-04-16 DIAGNOSIS — I10 HYPERTENSION, ESSENTIAL: Chronic | ICD-10-CM

## 2024-04-16 DIAGNOSIS — E78.5 DYSLIPIDEMIA: Chronic | ICD-10-CM

## 2024-04-16 DIAGNOSIS — E66.9 OBESITY (BMI 30-39.9): Chronic | ICD-10-CM

## 2024-04-16 DIAGNOSIS — Z00.00 MEDICARE ANNUAL WELLNESS VISIT, SUBSEQUENT: Primary | ICD-10-CM

## 2024-04-16 RX ORDER — LISINOPRIL 30 MG/1
30 TABLET ORAL DAILY
Qty: 90 TABLET | Refills: 3 | Status: SHIPPED | OUTPATIENT
Start: 2024-04-16

## 2024-04-16 RX ORDER — AMLODIPINE BESYLATE 10 MG/1
10 TABLET ORAL DAILY
Qty: 90 TABLET | Refills: 3 | Status: SHIPPED | OUTPATIENT
Start: 2024-04-16

## 2024-04-16 ASSESSMENT — LIFESTYLE VARIABLES
HOW OFTEN DO YOU HAVE A DRINK CONTAINING ALCOHOL: NEVER
HOW MANY STANDARD DRINKS CONTAINING ALCOHOL DO YOU HAVE ON A TYPICAL DAY: PATIENT DOES NOT DRINK

## 2024-04-16 ASSESSMENT — PATIENT HEALTH QUESTIONNAIRE - PHQ9
SUM OF ALL RESPONSES TO PHQ QUESTIONS 1-9: 0
SUM OF ALL RESPONSES TO PHQ9 QUESTIONS 1 & 2: 0
1. LITTLE INTEREST OR PLEASURE IN DOING THINGS: NOT AT ALL
2. FEELING DOWN, DEPRESSED OR HOPELESS: NOT AT ALL
SUM OF ALL RESPONSES TO PHQ QUESTIONS 1-9: 0

## 2024-04-25 ENCOUNTER — APPOINTMENT (OUTPATIENT)
Dept: CT IMAGING | Age: 69
End: 2024-04-25
Payer: MEDICARE

## 2024-04-25 ENCOUNTER — HOSPITAL ENCOUNTER (EMERGENCY)
Age: 69
Discharge: HOME | End: 2024-04-25
Attending: EMERGENCY MEDICINE
Payer: MEDICARE

## 2024-04-25 VITALS
RESPIRATION RATE: 17 BRPM | OXYGEN SATURATION: 94 % | BODY MASS INDEX: 38.24 KG/M2 | WEIGHT: 282 LBS | DIASTOLIC BLOOD PRESSURE: 84 MMHG | SYSTOLIC BLOOD PRESSURE: 138 MMHG | HEART RATE: 79 BPM | TEMPERATURE: 97.8 F

## 2024-04-25 DIAGNOSIS — M54.32 SCIATICA OF LEFT SIDE: ICD-10-CM

## 2024-04-25 DIAGNOSIS — M54.2 NECK PAIN: Primary | ICD-10-CM

## 2024-04-25 LAB
ANION GAP SERPL CALC-SCNC: 11 MEQ/L (ref 8–16)
APTT PPP: 33.2 SECONDS (ref 22–38)
BASOPHILS ABSOLUTE: 0.1 THOU/MM3 (ref 0–0.1)
BASOPHILS NFR BLD AUTO: 1.5 %
BUN SERPL-MCNC: 16 MG/DL (ref 7–22)
CALCIUM SERPL-MCNC: 9.4 MG/DL (ref 8.5–10.5)
CHLORIDE SERPL-SCNC: 104 MEQ/L (ref 98–111)
CO2 SERPL-SCNC: 25 MEQ/L (ref 23–33)
CREAT SERPL-MCNC: 0.7 MG/DL (ref 0.4–1.2)
CRP SERPL-MCNC: 0.55 MG/DL (ref 0–1)
DEPRECATED RDW RBC AUTO: 44.2 FL (ref 35–45)
EOSINOPHIL NFR BLD AUTO: 3.4 %
EOSINOPHILS ABSOLUTE: 0.2 THOU/MM3 (ref 0–0.4)
ERYTHROCYTE [DISTWIDTH] IN BLOOD BY AUTOMATED COUNT: 13.6 % (ref 11.5–14.5)
GFR SERPL CREATININE-BSD FRML MDRD: > 90 ML/MIN/1.73M2
GLUCOSE SERPL-MCNC: 105 MG/DL (ref 70–108)
HCT VFR BLD AUTO: 43.6 % (ref 42–52)
HGB BLD-MCNC: 14.4 GM/DL (ref 14–18)
IMM GRANULOCYTES # BLD AUTO: 0.02 THOU/MM3 (ref 0–0.07)
IMM GRANULOCYTES NFR BLD AUTO: 0.4 %
INR PPP: 0.98 (ref 0.85–1.13)
LYMPHOCYTES ABSOLUTE: 1.4 THOU/MM3 (ref 1–4.8)
LYMPHOCYTES NFR BLD AUTO: 26.3 %
MAGNESIUM SERPL-MCNC: 2 MG/DL (ref 1.6–2.4)
MCH RBC QN AUTO: 29.6 PG (ref 26–33)
MCHC RBC AUTO-ENTMCNC: 33 GM/DL (ref 32.2–35.5)
MCV RBC AUTO: 89.5 FL (ref 80–94)
MONOCYTES ABSOLUTE: 0.5 THOU/MM3 (ref 0.4–1.3)
MONOCYTES NFR BLD AUTO: 8.9 %
NEUTROPHILS NFR BLD AUTO: 59.5 %
NRBC BLD AUTO-RTO: 0 /100 WBC
OSMOLALITY SERPL CALC.SUM OF ELEC: 280.9 MOSMOL/KG (ref 275–300)
PLATELET # BLD AUTO: 257 THOU/MM3 (ref 130–400)
PMV BLD AUTO: 9.7 FL (ref 9.4–12.4)
POTASSIUM SERPL-SCNC: 4.5 MEQ/L (ref 3.5–5.2)
RBC # BLD AUTO: 4.87 MILL/MM3 (ref 4.7–6.1)
SEGMENTED NEUTROPHILS ABSOLUTE COUNT: 3.2 THOU/MM3 (ref 1.8–7.7)
SODIUM SERPL-SCNC: 140 MEQ/L (ref 135–145)
WBC # BLD AUTO: 5.4 THOU/MM3 (ref 4.8–10.8)

## 2024-04-25 PROCEDURE — 70450 CT HEAD/BRAIN W/O DYE: CPT

## 2024-04-25 PROCEDURE — 85025 COMPLETE CBC W/AUTO DIFF WBC: CPT

## 2024-04-25 PROCEDURE — 86140 C-REACTIVE PROTEIN: CPT

## 2024-04-25 PROCEDURE — 70496 CT ANGIOGRAPHY HEAD: CPT

## 2024-04-25 PROCEDURE — 99285 EMERGENCY DEPT VISIT HI MDM: CPT

## 2024-04-25 PROCEDURE — 85730 THROMBOPLASTIN TIME PARTIAL: CPT

## 2024-04-25 PROCEDURE — 83735 ASSAY OF MAGNESIUM: CPT

## 2024-04-25 PROCEDURE — 36415 COLL VENOUS BLD VENIPUNCTURE: CPT

## 2024-04-25 PROCEDURE — 6360000004 HC RX CONTRAST MEDICATION: Performed by: EMERGENCY MEDICINE

## 2024-04-25 PROCEDURE — 80048 BASIC METABOLIC PNL TOTAL CA: CPT

## 2024-04-25 PROCEDURE — 76376 3D RENDER W/INTRP POSTPROCES: CPT

## 2024-04-25 PROCEDURE — 85610 PROTHROMBIN TIME: CPT

## 2024-04-25 PROCEDURE — 6370000000 HC RX 637 (ALT 250 FOR IP): Performed by: STUDENT IN AN ORGANIZED HEALTH CARE EDUCATION/TRAINING PROGRAM

## 2024-04-25 PROCEDURE — 70498 CT ANGIOGRAPHY NECK: CPT

## 2024-04-25 RX ORDER — ACETAMINOPHEN 500 MG
1000 TABLET ORAL ONCE
Status: COMPLETED | OUTPATIENT
Start: 2024-04-25 | End: 2024-04-25

## 2024-04-25 RX ORDER — LIDOCAINE 4 G/G
1 PATCH TOPICAL ONCE
Status: DISCONTINUED | OUTPATIENT
Start: 2024-04-25 | End: 2024-04-25 | Stop reason: HOSPADM

## 2024-04-25 RX ADMIN — ACETAMINOPHEN 1000 MG: 500 TABLET ORAL at 08:20

## 2024-04-25 RX ADMIN — IOPAMIDOL 80 ML: 755 INJECTION, SOLUTION INTRAVENOUS at 09:02

## 2024-04-25 ASSESSMENT — PAIN DESCRIPTION - PAIN TYPE: TYPE: ACUTE PAIN

## 2024-04-25 ASSESSMENT — PAIN DESCRIPTION - LOCATION: LOCATION: HIP;NECK

## 2024-04-25 ASSESSMENT — PAIN - FUNCTIONAL ASSESSMENT: PAIN_FUNCTIONAL_ASSESSMENT: 0-10

## 2024-04-25 ASSESSMENT — PAIN DESCRIPTION - ONSET: ONSET: ON-GOING

## 2024-04-25 ASSESSMENT — PAIN DESCRIPTION - FREQUENCY: FREQUENCY: INTERMITTENT

## 2024-04-25 ASSESSMENT — PAIN SCALES - GENERAL: PAINLEVEL_OUTOF10: 6

## 2024-04-25 NOTE — ED PROVIDER NOTES
PATIENT NAME: Polo Mariano  MRN: [id number]  : 1955  MORAN: 2024    I performed a history and physical examination of the patient and discussed management with the Resident. I reviewed the Resident's note and agree with the documented findings and plan of care. Any areas of disagreement are noted on the chart. I was personally present for the key portions of any procedures and have documented in the chart those procedures where I was not present during the key portions. I have reviewed the emergency nurses triage note and agree with the chief complaint, past medical history, past surgical history, allergies, medications, social and family history as documented unless otherwise noted below.    MEDICAL DEDISION MAKING (MDM)     Polo Mariano is a 68 y.o. male who presents to Emergency Department with Neck Pain and Hip Pain     He fell a month ago when carrying lunch and his foot got caught at steps. Pain has been persistent ever since then.   Pain is from right skull base and upper neck with tenderness. Pain is positional and reproducible. Occasional both hand 4th finger positional numbness. No upper or lower extremity weakness. Today also develops left hip pain.     Exam: AVSS. Nontoxic appearing. Heart: RRR, S1 and S2. Lungs CTAB. Soft abdomen w/o tenderness. Neurologically intact. No skin rashes. Positive left side Spurling test. No lumbar spine midline tenderness. No left hip tenderness. LLE intact NV exam.     ED workup is reassuring. No evidence of LVO. His hand symptoms are not consistent with stroke (bilateral isolated neuropathy with neck pain). Discharged with PCP follow up in 1 wk.       Vitals:    24 0731 24 0845   BP: (!) 114/101 138/84   Pulse: 79    Resp: 17    Temp: 97.8 °F (36.6 °C)    TempSrc: Oral    SpO2: 96% 94%   Weight: 127.9 kg (282 lb)      Labs Reviewed   CBC WITH AUTO DIFFERENTIAL   BASIC METABOLIC PANEL   MAGNESIUM   C-REACTIVE PROTEIN   APTT   PROTIME-INR   ANION 
Status:  Not addressed during this ED visit    Summary of Patient Presentation:   ED Course as of 04/25/24 1606   Thu Apr 25, 2024   0940 CT RECONSTRUCTION WO POST PROCESS  IMPRESSION:     1. Degenerative changes the cervical spine.  2. Mild spinal canal stenosis with moderate severity right foraminal stenosis at the C5-6 level.   [EL]   0954 CTA HEAD W WO CONTRAST  IMPRESSION:     1. Very mild calcified atherosclerosis of the carotid bulbs. There is no associated stenosis.  2. Normal CTA of the head.   [EL]      ED Course User Index  [EL] Ellen Richey MD       ED Medications administered this visit:  (None if blank)  Medications   acetaminophen (TYLENOL) tablet 1,000 mg (1,000 mg Oral Given 4/25/24 0820)   iopamidol (ISOVUE-370) 76 % injection 80 mL (80 mLs IntraVENous Given 4/25/24 0902)       Vitals Reviewed:    Vitals:    04/25/24 0731 04/25/24 0845   BP: (!) 114/101 138/84   Pulse: 79    Resp: 17    Temp: 97.8 °F (36.6 °C)    TempSrc: Oral    SpO2: 96% 94%   Weight: 127.9 kg (282 lb)        PROCEDURES: (None if blank)  Procedures:     CRITICAL CARE: (Please see Attending note / Attestation regarding Critical Care Time. )    Medical Decision Making     68-year-old male here for neck pain and hip pain.  Has been ongoing for quite some time however new symptoms of paresthesias happened last night and today.  Due to the patient's neck pain and the paresthesias, as well as history of dizziness on previous ED visit, did obtain CT and CTAs which were negative for any acute process.  Did show some moderate severity of right foraminal stenosis at C5-C6 which may contribute to patient's symptoms.  Believe that patient's left-sided hip pain is sciatica related.  Lidocaine patch did provide some symptomatic relief for patient.  Advised that he can take this over-the-counter as needed.  Ultimately, believe that patient is stable for discharge with outpatient follow-up to orthopedics.  Patient agreeable to plan.    FINAL

## 2024-04-25 NOTE — ED NOTES
Patient to ED with right sided neck and left hip pain. Patient states he fell 1 month ago when he tripped and fell onto his bilateral knees and caught himself with his hands falling forward on concrete. He denies head injury or any other injuries. He states that he has been ambulatory since that time but has had intermittent pain. Seen by PCP. Patient notes he has been using ibuprofen 400 mg with little relief. Patient is resting in bed with easy and unlabored respirations. Call light in reach. Side rails up x2. Patient denies further complaints or concerns. Will monitor.

## 2024-04-25 NOTE — DISCHARGE INSTRUCTIONS
You are seen today in the emergency department for neck pain and hip pain.  Your symptoms improved after lidocaine patch and Tylenol.  You can take these over-the-counter at home.  Ultimately, follow-up with orthopedics regarding today's visit.

## 2024-04-29 ENCOUNTER — CARE COORDINATION (OUTPATIENT)
Dept: CARE COORDINATION | Age: 69
End: 2024-04-29

## 2024-05-01 NOTE — CARE COORDINATION
Received a missed call from Tiffanie.  Attempted to call her back and was not able to reach and unable to leave voicemail.

## 2024-05-01 NOTE — CARE COORDINATION
Ambulatory Care Coordination Note     2024 9:47 AM     Patient Current Location:  Home: [address] [place] [address]     This patient was received as a referral from Population health report .    ACM contacted the spouse/partner by telephone. Verified name and  with spouse/partner as identifiers. Provided introduction to self, and explanation of the ACM role.   Patient accepted care management services at this time.          ACM: Ivett Luis RN     Challenges to be reviewed by the provider   Additional needs identified to be addressed with provider No  none               Method of communication with provider: none.    Care Summary Note: Spoke with Tiffanie  Introduced self/role  Was ED follow up and [facility] eligible for support  Discussed fall which was several months ago and continued neck and hip pain resulting in ED visit  Discussed follow up with [facility] and Tiffanie was in agreement for appt  ACM called [facility] and set up appt for this week Friday at 10:20am with Dr. Tinsley  Confirmed date and time of appt with Tiffanie who verbalized understanding  Chronic conditions stable  Will not keep on Care coordination long due to no barriers noted  Will follow up next week to see how [facility] appt went    Plan  Work with for short period of time to ensure acute neck/hip pain resolved  Assess for any additional support needed  Reinforce fall education and prevention to reduce risk for injury related to falls    Offered patient enrollment in the Remote Patient Monitoring (RPM) program for in-home monitoring: Yes, but did not enroll at this time: stable .     Assessments Completed:   General Assessment    Do you have any symptoms that are causing concern?: Yes  Progression since Onset: Unchanged  Reported Symptoms: Pain, Other (Comment: neck and hip pain from fall)          Care Planning:    Goals Addressed                      This Visit's Progress      Reduce Falls  (pt-stated)         I will reduce my risk of falls by the

## 2024-05-08 ENCOUNTER — HOSPITAL ENCOUNTER (EMERGENCY)
Age: 69
Discharge: HOME | End: 2024-05-08
Payer: MEDICARE

## 2024-05-08 VITALS
OXYGEN SATURATION: 96 % | BODY MASS INDEX: 37.97 KG/M2 | HEART RATE: 75 BPM | WEIGHT: 280 LBS | SYSTOLIC BLOOD PRESSURE: 151 MMHG | DIASTOLIC BLOOD PRESSURE: 83 MMHG | RESPIRATION RATE: 20 BRPM | TEMPERATURE: 97.3 F

## 2024-05-08 DIAGNOSIS — J06.9 URI WITH COUGH AND CONGESTION: Primary | ICD-10-CM

## 2024-05-08 PROCEDURE — 99213 OFFICE O/P EST LOW 20 MIN: CPT

## 2024-05-08 PROCEDURE — 99213 OFFICE O/P EST LOW 20 MIN: CPT | Performed by: NURSE PRACTITIONER

## 2024-05-08 RX ORDER — LORATADINE 10 MG
1 CAPSULE ORAL 2 TIMES DAILY PRN
Qty: 60 CAPSULE | Refills: 0 | Status: SHIPPED | OUTPATIENT
Start: 2024-05-08

## 2024-05-08 RX ORDER — OXYMETAZOLINE HYDROCHLORIDE 0.05 G/100ML
2 SPRAY NASAL 2 TIMES DAILY
Qty: 12 ML | Refills: 0 | Status: SHIPPED | OUTPATIENT
Start: 2024-05-08 | End: 2024-06-07

## 2024-05-08 ASSESSMENT — ENCOUNTER SYMPTOMS
SHORTNESS OF BREATH: 0
NAUSEA: 0
SINUS PAIN: 0
SINUS PRESSURE: 1
SORE THROAT: 0
COUGH: 1

## 2024-05-08 ASSESSMENT — PAIN - FUNCTIONAL ASSESSMENT: PAIN_FUNCTIONAL_ASSESSMENT: NONE - DENIES PAIN

## 2024-05-08 NOTE — ED PROVIDER NOTES
[facility] URGENT CARE  Urgent Care Encounter       CHIEF COMPLAINT       Chief Complaint   Patient presents with    Head Congestion     Sinus pressure         Nurses Notes reviewed and I agree except as noted in the HPI.  HISTORY OF PRESENT ILLNESS   Polo Mariano is a 68 y.o. male who presents with complaints of head congestion and sinus pressure with an associated cough.  Reports his symptoms started this morning.  This is a new problem.  Admits to history of \"sinus problems\".  Denies any history of deviated septum.  No headaches or fevers reported.  Has not tried any treatment.  Unsure if anything makes better or worse.    The history is provided by the patient.       REVIEW OF SYSTEMS     Review of Systems   Constitutional:  Negative for fever.   HENT:  Positive for congestion and sinus pressure. Negative for sinus pain and sore throat.    Respiratory:  Positive for cough. Negative for shortness of breath.    Cardiovascular:  Negative for chest pain.   Gastrointestinal:  Negative for nausea.   Musculoskeletal:  Negative for myalgias.   Neurological:  Negative for headaches.       PAST MEDICAL HISTORY         Diagnosis Date    Dyslipidemia 04/15/2024    History of prostate cancer 10/18/2021    Hypertension, essential     Obesity (BMI 30-39.9)     Psoriasis        SURGICALHISTORY     Patient  has a past surgical history that includes Knee arthroscopy; Ulnar tunnel release; Prostate biopsy (2021); and Prostatectomy (Bilateral, 12/2/2021).    CURRENT MEDICATIONS       Previous Medications    AMLODIPINE (NORVASC) 10 MG TABLET    Take 1 tablet by mouth daily    LISINOPRIL (PRINIVIL;ZESTRIL) 30 MG TABLET    Take 1 tablet by mouth daily       ALLERGIES     Patient is has No Known Allergies.    Patients   Immunization History   Administered Date(s) Administered    COVID-19, MODERNA BLUE border, Primary or Immunocompromised, (age 12y+), IM, 100 mcg/0.5mL 03/13/2021, 04/08/2021    COVID-19, MODERNA Booster

## 2024-05-09 ENCOUNTER — TELEPHONE (OUTPATIENT)
Dept: FAMILY MEDICINE CLINIC | Age: 69
End: 2024-05-09

## 2024-05-09 DIAGNOSIS — J01.90 ACUTE RHINOSINUSITIS: Primary | ICD-10-CM

## 2024-05-09 RX ORDER — DOXYCYCLINE HYCLATE 100 MG
100 TABLET ORAL 2 TIMES DAILY
Qty: 20 TABLET | Refills: 0 | Status: SHIPPED | OUTPATIENT
Start: 2024-05-09 | End: 2024-05-19

## 2024-05-09 NOTE — TELEPHONE ENCOUNTER
F/u if no better     Diagnosis Orders   1. Acute rhinosinusitis  doxycycline hyclate (VIBRA-TABS) 100 MG tablet

## 2024-05-09 NOTE — TELEPHONE ENCOUNTER
Pt requesting a abx   Pt went to  yesterday was given nasal spray and cough medication    Pt states he's had nasal congestion since tues  Now has a sore throat , he also has a dry cough    Pharmacy [facility] [address]

## 2024-05-10 ENCOUNTER — CARE COORDINATION (OUTPATIENT)
Dept: CARE COORDINATION | Age: 69
End: 2024-05-10

## 2024-05-10 NOTE — CARE COORDINATION
Ambulatory Care Coordination Note     5/10/2024 1:17 PM     Patient Current Location:  Home:  [address] [place] [address]     ACM contacted the spouse/partner  by telephone. Verified name and  with spouse/partner  as identifiers.         ACM: Ivett Luis RN     Challenges to be reviewed by the provider   Additional needs identified to be addressed with provider No  none               Method of communication with provider: none.    Care Summary Note: Spoke with Tiffanie for continued Care Coordination  States Breezy has started antibiotic ordered by PCP and seems to be feeling a little better  Was in UC for sx's  States he completed appt with [facility] and will be starting therapy  No other barriers noted  Will keep on for a short period of time to see if any additional needs towards feeling better    Plan  Plan for short follow up   Ensure no barriers with starting therapy  Reinforce importance of early symptom recognition and reporting  Offer Same Day appts to address acute illness as needed    Offered patient enrollment in the Remote Patient Monitoring (RPM) program for in-home monitoring: Yes, but did not enroll at this time: controlled chronic disease management.     Assessments Completed:   General Assessment    Do you have any symptoms that are causing concern?: Yes  Progression since Onset: Unchanged  Reported Symptoms: Pain (Comment: neck and hip pain)          Advance Care Planning:   Not reviewed during this call     Medications Reviewed:   Not completed during this call:      Care Planning:    Goals Addressed                      This Visit's Progress      Reduce Falls  (pt-stated)   On track      I will reduce my risk of falls by the following:  be proactive with fall prevention    Barriers: need for additional support and education  Plan for overcoming my barriers: family and ACM support  Confidence: 10/10  Anticipated Goal Completion Date: 24               PCP/Specialist follow up:   Future

## 2024-05-24 ENCOUNTER — CARE COORDINATION (OUTPATIENT)
Dept: CARE COORDINATION | Age: 69
End: 2024-05-24

## 2024-05-24 NOTE — CARE COORDINATION
ACM support  Confidence: 10/10  Anticipated Goal Completion Date: 8-1-24               PCP/Specialist follow up:   Future Appointments         Provider Specialty Dept Phone    7/25/2024 2:30 PM Mady Remy APRN - CNP Urology [phone]    4/17/2025 10:00 AM Doug Goldsmith, DO Family Medicine [phone]            Follow Up:   Plan for next ACM outreach in approximately 2 weeks to complete:  - disease specific assessments  - medication review   - goal progression  - education .   patient  is agreeable to this plan.

## 2024-06-07 ENCOUNTER — CARE COORDINATION (OUTPATIENT)
Dept: CARE COORDINATION | Age: 69
End: 2024-06-07

## 2024-06-11 ENCOUNTER — OFFICE VISIT (OUTPATIENT)
Dept: FAMILY MEDICINE CLINIC | Age: 69
End: 2024-06-11

## 2024-06-11 VITALS
SYSTOLIC BLOOD PRESSURE: 134 MMHG | RESPIRATION RATE: 18 BRPM | HEART RATE: 68 BPM | DIASTOLIC BLOOD PRESSURE: 76 MMHG | HEIGHT: 72 IN | WEIGHT: 285 LBS | OXYGEN SATURATION: 98 % | BODY MASS INDEX: 38.6 KG/M2 | TEMPERATURE: 97.8 F

## 2024-06-11 DIAGNOSIS — J32.9 SINOBRONCHITIS: Primary | ICD-10-CM

## 2024-06-11 DIAGNOSIS — J40 SINOBRONCHITIS: Primary | ICD-10-CM

## 2024-06-11 RX ORDER — DEXTROMETHORPHAN HYDROBROMIDE AND PROMETHAZINE HYDROCHLORIDE 15; 6.25 MG/5ML; MG/5ML
5 SYRUP ORAL 4 TIMES DAILY PRN
Qty: 140 ML | Refills: 0 | Status: SHIPPED | OUTPATIENT
Start: 2024-06-11 | End: 2024-06-18

## 2024-06-11 RX ORDER — DOXYCYCLINE HYCLATE 100 MG
100 TABLET ORAL 2 TIMES DAILY
Qty: 20 TABLET | Refills: 0 | Status: SHIPPED | OUTPATIENT
Start: 2024-06-11 | End: 2024-06-21

## 2024-06-11 RX ORDER — MELOXICAM 15 MG/1
15 TABLET ORAL DAILY
COMMUNITY
Start: 2024-05-05 | End: 2024-08-05

## 2024-06-11 NOTE — PROGRESS NOTES
Chief Complaint   Patient presents with    Chest Congestion    Cough     History obtained from the patient.    SUBJECTIVE:  Polo Mariano is a 69 y.o. male that presents today for     -URI type sxs:   5+ days  Nasal congestion  Cough  No fever  No SOB  Double sickning    Fever - No  Runny nose or congestion -  Yes   Cough -  Yes  Sore throat -  No  Headache, fatigue, joint pains, muscle aches -  No  Double Sickening - Yes  Shortness of breath/Wheezing? -  No  Nausea/Vomiting/Diarrhea?  No  Sick contacts - Yes  Maxillary Tooth Pain -  Yes  Treatment tried and response - otc meds, no better      Age/Gender Health Maintenance    Lipid -   Lab Results   Component Value Date    CHOL 180 04/10/2024    CHOL 161 07/13/2021    CHOL 156 01/22/2014     Lab Results   Component Value Date    TRIG 156 04/10/2024    TRIG 101 07/13/2021    TRIG 141 01/22/2014     Lab Results   Component Value Date    HDL 38 04/10/2024    HDL 42 07/13/2021    HDL 35 01/22/2014     Lab Results   Component Value Date     04/10/2024    LDL 99 07/13/2021    LDL 93 01/22/2014       DM Screen -   Lab Results   Component Value Date/Time    GLUCOSE 105 04/25/2024 08:13 AM     No results found for: \"LABA1C\"      Colon Cancer Screening - + polyp SEPT 2020, repeat 5 years per GI, Ashley  Lung Cancer Screening - never smoker    Tetanus - to get at pharmacy per medicare rules  Influenza Vaccine - UTD MARCH 2024  Pneumonia Vaccine - UTD x 2  Zoster - to get at pharmacy per medicare rules     PSA testing discussion - follows with urology  Lab Results   Component Value Date    PSA <0.02 07/24/2023    PSA <0.02 01/17/2023    PSA <0.02 07/26/2022       AAA Screening - never smoker      Current Outpatient Medications   Medication Sig Dispense Refill    meloxicam (MOBIC) 15 MG tablet Take 1 tablet by mouth daily      doxycycline hyclate (VIBRA-TABS) 100 MG tablet Take 1 tablet by mouth 2 times daily for 10 days 20 tablet 0

## 2024-06-13 ENCOUNTER — CARE COORDINATION (OUTPATIENT)
Dept: CARE COORDINATION | Age: 69
End: 2024-06-13

## 2024-06-13 NOTE — CARE COORDINATION
Completion Date: 8-1-24               PCP/Specialist follow up:   Future Appointments         Provider Specialty Dept Phone    7/25/2024 2:30 PM Mady Remy, JENNY - CNP Urology [phone]    4/17/2025 10:00 AM Doug Goldsmith,  Family Medicine [phone]            Follow Up:   Plan for next ACM outreach in approximately 2 weeks to complete:  - disease specific assessments  - medication review   - goal progression  - education .   patient  is agreeable to this plan.

## 2024-06-14 ENCOUNTER — TELEPHONE (OUTPATIENT)
Dept: FAMILY MEDICINE CLINIC | Age: 69
End: 2024-06-14

## 2024-06-14 DIAGNOSIS — J32.9 SINOBRONCHITIS: Primary | ICD-10-CM

## 2024-06-14 DIAGNOSIS — J40 SINOBRONCHITIS: Primary | ICD-10-CM

## 2024-06-14 RX ORDER — PREDNISONE 20 MG/1
40 TABLET ORAL DAILY
Qty: 10 TABLET | Refills: 0 | Status: SHIPPED | OUTPATIENT
Start: 2024-06-14 | End: 2024-06-19

## 2024-06-14 RX ORDER — BENZONATATE 100 MG/1
CAPSULE ORAL
Qty: 60 CAPSULE | Refills: 0 | Status: SHIPPED | OUTPATIENT
Start: 2024-06-14 | End: 2024-06-24

## 2024-06-14 NOTE — TELEPHONE ENCOUNTER
Do need the give the aTB a few more days to work  Will add prednisone and tessalon perles as well  F/u if no better  Let me know if questions, thanks!     Diagnosis Orders   1. Sinobronchitis  benzonatate (TESSALON PERLES) 100 MG capsule    predniSONE (DELTASONE) 20 MG tablet          Future Appointments   Date Time Provider Department Center   7/25/2024  2:30 PM Mady Remy, JENNY - CNP [facility] [place] [facility]   4/17/2025 10:00 AM Doug Goldsmith, DO Fam Med [facility]

## 2024-06-14 NOTE — TELEPHONE ENCOUNTER
Patient called in stating he was seen on 6.11.2024 and has been taking medication that was prescribe that day. Patient states he does feel any better, he does not see any improvement. Pt states he cough gets worse at night time.    Is there anything else that can be sent in or give current medication more time to work?    Patient still using [facility] on [address] for Pharmacy.    Thank you

## 2024-06-14 NOTE — TELEPHONE ENCOUNTER
Patient has been informed and voiced understanding of the information below with no other questions and or concerns at this time

## 2024-06-27 ENCOUNTER — CARE COORDINATION (OUTPATIENT)
Dept: CARE COORDINATION | Age: 69
End: 2024-06-27

## 2024-06-27 NOTE — CARE COORDINATION
Ambulatory Care Coordination Note     2024 11:42 AM     Patient Current Location:  Home:  [address] [place] [address]     ACM contacted the spouse/partner  by telephone. Verified name and  with spouse/partner as identifiers.         ACM: vIett Luis RN     Challenges to be reviewed by the provider   Additional needs identified to be addressed with provider No  none               Method of communication with provider: none.    Care Summary Note:   Attempted to reach Breezy and not able to reach or leave voicemail  Called wife and states Breezy is doing better  States sx's of acute illness are improved  Denies additional needs or interventions at this time  States overall Breezy is doing well  Will work towards graduation if stable and no additional needs present    Plan  Reinforce education completed  Encouraged to call with any sx's changes or worsening of sx's  Reinforce importance of early symptom recognition and reporting  Work towards graduation if no additional barriers    Offered patient enrollment in the Remote Patient Monitoring (RPM) program for in-home monitoring: Yes, but did not enroll at this time: controlled chronic disease management.     Assessments Completed:   General Assessment    Do you have any symptoms that are causing concern?: Yes  Progression since Onset: Gradually Improving  Reported Symptoms: Cough          Medications Reviewed:   Completed during this call    Advance Care Planning:   Not reviewed during this call     Care Planning:    Goals Addressed                      This Visit's Progress      Reduce Falls  (pt-stated)   On track      I will reduce my risk of falls by the following:  be proactive with fall prevention    Barriers: need for additional support and education  Plan for overcoming my barriers: family and ACM support  Confidence: 10/10  Anticipated Goal Completion Date: 24               PCP/Specialist follow up:   Future Appointments         Provider Specialty Dept

## 2024-07-18 ENCOUNTER — CARE COORDINATION (OUTPATIENT)
Dept: CARE COORDINATION | Age: 69
End: 2024-07-18

## 2024-07-18 NOTE — CARE COORDINATION
Dr. Goldsmith,       I have been working with Polo on Care Coordination program to provide support and education to help manage chronic conditions.  He has met those goals and all education has been completed with no new barriers noted.  I would like to graduate pt from Care Coordination at this time pending PCP approval.  Do you approve of this discharge?  Please advise. Thank you.

## 2024-07-26 ENCOUNTER — TELEPHONE (OUTPATIENT)
Dept: FAMILY MEDICINE CLINIC | Age: 69
End: 2024-07-26

## 2024-07-26 DIAGNOSIS — H81.13 BENIGN PAROXYSMAL POSITIONAL VERTIGO DUE TO BILATERAL VESTIBULAR DISORDER: Primary | ICD-10-CM

## 2024-07-26 RX ORDER — MECLIZINE HYDROCHLORIDE 25 MG/1
25 TABLET ORAL 3 TIMES DAILY PRN
Qty: 30 TABLET | Refills: 0 | Status: SHIPPED | OUTPATIENT
Start: 2024-07-26 | End: 2024-08-05

## 2024-07-26 NOTE — TELEPHONE ENCOUNTER
Yes he is having vertigo again, he said he is sitting down and is having light headiness and mainly in the mornings. He said starts around 2am. Started back up on Tuesday he said.

## 2024-07-26 NOTE — TELEPHONE ENCOUNTER
Patient called in and said he was on a medication for vertigo about a month ago and was wondering if he could get it refilled but he is not sure what it was called

## 2024-07-26 NOTE — TELEPHONE ENCOUNTER
Meclizine sent  Have him start the exercises I gave him last time  F/u in office if no better  Let me know if questions, thanks!     Diagnosis Orders   1. Benign paroxysmal positional vertigo due to bilateral vestibular disorder  meclizine (ANTIVERT) 25 MG tablet

## 2024-08-08 ENCOUNTER — OFFICE VISIT (OUTPATIENT)
Dept: FAMILY MEDICINE CLINIC | Age: 69
End: 2024-08-08

## 2024-08-08 VITALS
HEIGHT: 72 IN | DIASTOLIC BLOOD PRESSURE: 80 MMHG | HEART RATE: 81 BPM | SYSTOLIC BLOOD PRESSURE: 130 MMHG | TEMPERATURE: 97.8 F | BODY MASS INDEX: 39.14 KG/M2 | WEIGHT: 289 LBS | RESPIRATION RATE: 18 BRPM | OXYGEN SATURATION: 98 %

## 2024-08-08 DIAGNOSIS — G57.02 PIRIFORMIS SYNDROME, LEFT: ICD-10-CM

## 2024-08-08 DIAGNOSIS — H81.13 BENIGN PAROXYSMAL POSITIONAL VERTIGO DUE TO BILATERAL VESTIBULAR DISORDER: Primary | ICD-10-CM

## 2024-08-08 DIAGNOSIS — I87.2 CHRONIC VENOUS INSUFFICIENCY OF LOWER EXTREMITY: ICD-10-CM

## 2024-08-08 RX ORDER — MECLIZINE HYDROCHLORIDE 25 MG/1
25 TABLET ORAL 3 TIMES DAILY PRN
COMMUNITY
Start: 2024-08-01 | End: 2024-08-22

## 2024-08-08 NOTE — PROGRESS NOTES
Doug Goldsmith DO [facility] DEP     PATIENT COUNSELING    Polo received counseling on the following healthy behaviors: nutrition, exercise and medication adherence    Barriers to learning and self management: none    Discussed use, benefit, and side effects of prescribed medications.  Barriers to medication compliance addressed.  All patient questions answered.  Pt voiced understanding.       Electronically signed by Doug Goldsmith DO on 8/8/2024 at 3:18 PM

## 2024-08-26 ENCOUNTER — HOSPITAL ENCOUNTER (EMERGENCY)
Age: 69
Discharge: HOME | End: 2024-08-26
Payer: MEDICARE

## 2024-08-26 ENCOUNTER — APPOINTMENT (OUTPATIENT)
Dept: GENERAL RADIOLOGY | Age: 69
End: 2024-08-26
Payer: MEDICARE

## 2024-08-26 VITALS
RESPIRATION RATE: 16 BRPM | HEART RATE: 74 BPM | SYSTOLIC BLOOD PRESSURE: 150 MMHG | WEIGHT: 285 LBS | TEMPERATURE: 98 F | BODY MASS INDEX: 38.64 KG/M2 | DIASTOLIC BLOOD PRESSURE: 92 MMHG | OXYGEN SATURATION: 95 %

## 2024-08-26 DIAGNOSIS — R09.81 NASAL CONGESTION: Primary | ICD-10-CM

## 2024-08-26 DIAGNOSIS — M25.531 ACUTE PAIN OF RIGHT WRIST: ICD-10-CM

## 2024-08-26 DIAGNOSIS — H65.191 OTHER ACUTE NONSUPPURATIVE OTITIS MEDIA OF RIGHT EAR, RECURRENCE NOT SPECIFIED: ICD-10-CM

## 2024-08-26 LAB
FLUAV RNA RESP QL NAA+PROBE: NOT DETECTED
FLUBV RNA RESP QL NAA+PROBE: NOT DETECTED
SARS-COV-2 RNA RESP QL NAA+PROBE: NOT DETECTED

## 2024-08-26 PROCEDURE — 87636 SARSCOV2 & INF A&B AMP PRB: CPT

## 2024-08-26 PROCEDURE — 73110 X-RAY EXAM OF WRIST: CPT

## 2024-08-26 PROCEDURE — 99284 EMERGENCY DEPT VISIT MOD MDM: CPT

## 2024-08-26 RX ORDER — AMOXICILLIN 500 MG/1
1000 CAPSULE ORAL 3 TIMES DAILY
Qty: 60 CAPSULE | Refills: 0 | Status: SHIPPED | OUTPATIENT
Start: 2024-08-26 | End: 2024-09-05

## 2024-08-26 ASSESSMENT — PAIN - FUNCTIONAL ASSESSMENT: PAIN_FUNCTIONAL_ASSESSMENT: 0-10

## 2024-08-26 ASSESSMENT — PAIN SCALES - GENERAL: PAINLEVEL_OUTOF10: 6

## 2024-08-26 ASSESSMENT — PAIN DESCRIPTION - LOCATION: LOCATION: FACE

## 2024-08-26 NOTE — ED TRIAGE NOTES
Presents to ED with c/o sinus pressure and right wrist pain. Patient has had sinus pressure since yesterday morning. Patient fell Saturday and caught himself with his right hand. Alert and oriented. Respirations easy and unlabored.

## 2024-08-26 NOTE — DISCHARGE INSTRUCTIONS
Try over-the-counter antihistamine such as loratadine or cetirizine for nasal congestion.  Antibiotics as discussed    Follow-up with [facility]opedic [facility] in 2 days for recheck of the right wrist.  Wear the wrist splint as directed.    There is a possibility you have a hairline fracture we are not seeing on the x-ray.    Discharge warning    Please remember that examination and testing performed in the emergency department is not a comprehensive evaluation of all medical conditions and does not replace the need to follow up with your primary care provider.  In the emergency department, we are only able to evaluate your symptoms in the current condition, but symptoms may change or worsen.  Although you are felt safe to be discharged today, if your symptoms persist or change, you need to be re-evaluated by your regular/primary care doctor as soon as possible.  If you are unable to make appointment with your regular doctor, please come back to the ER to be re-evaluated.

## 2024-08-26 NOTE — ED PROVIDER NOTES
[facility] EMERGENCY DEPT      EMERGENCY MEDICINE     Pt Name: Polo Mariano  MRN: [id number]  Birthdate 1955  Date of evaluation: 8/26/2024  Provider: LUL Carrera    CHIEF COMPLAINT       Chief Complaint   Patient presents with    Wrist Pain    Facial Pain     HISTORY OF PRESENT ILLNESS   Polo Mariano is a pleasant 69 y.o. male who presents to the emergency department from home stating he has got a stuffy nose and right ear pressure which started last night.  Patient denying any sore throat.  Denies fever or chills.  States that he thought he would come in early before things get worse.  Denying any fever.  No chest pain or shortness of breath.  No rashes or lesions.  Patient also complaining of right wrist pain.  States he had a mechanical fall outside on Saturday.  Fell on outstretched right hand and has some pain at the wrist which is exacerbated by movement, alleviated by rest.  No distal paresthesias.  No distal or proximal injuries.  Patient past medical history is reviewed, see below.  Non-smoker.      PASTMEDICAL HISTORY     Past Medical History:   Diagnosis Date    Chronic venous insufficiency of lower extremity 08/08/2024    Dyslipidemia 04/15/2024    History of prostate cancer 10/18/2021    Hypertension, essential     Obesity (BMI 30-39.9)     Psoriasis        Patient Active Problem List   Diagnosis Code    Hypertension, essential I10    Obesity (BMI 30-39.9) E66.9    Psoriasis L40.9    History of prostate cancer Z85.46    Dyslipidemia E78.5    Chronic venous insufficiency of lower extremity I87.2     SURGICAL HISTORY       Past Surgical History:   Procedure Laterality Date    KNEE ARTHROSCOPY      right    PROSTATE BIOPSY  2021    PROSTATECTOMY Bilateral 12/2/2021    ROBOTIC ASSISTED LAPAROSCOPIC RADICAL PROSTECTOMY WITH BILATERAL LYMPH NODE DISSECTION performed by Phillip Villar MD at [facility] OR    ULNAR TUNNEL RELEASE         CURRENT MEDICATIONS       Discharge Medication List as of 8/26/2024

## 2024-09-03 ENCOUNTER — LAB (OUTPATIENT)
Dept: LAB | Age: 69
End: 2024-09-03

## 2024-09-03 DIAGNOSIS — C61 PROSTATE CANCER (HCC): ICD-10-CM

## 2024-09-04 ENCOUNTER — OFFICE VISIT (OUTPATIENT)
Dept: UROLOGY | Age: 69
End: 2024-09-04
Payer: MEDICARE

## 2024-09-04 VITALS — WEIGHT: 290 LBS | RESPIRATION RATE: 15 BRPM | BODY MASS INDEX: 39.28 KG/M2 | HEIGHT: 72 IN

## 2024-09-04 DIAGNOSIS — C61 PROSTATE CANCER (HCC): Primary | ICD-10-CM

## 2024-09-04 DIAGNOSIS — N52.31 ERECTILE DYSFUNCTION AFTER RADICAL PROSTATECTOMY: ICD-10-CM

## 2024-09-04 DIAGNOSIS — N39.3 STRESS INCONTINENCE: ICD-10-CM

## 2024-09-04 LAB — PSA SERPL-MCNC: < 0.02 NG/ML (ref 0–1)

## 2024-09-04 PROCEDURE — 3017F COLORECTAL CA SCREEN DOC REV: CPT | Performed by: NURSE PRACTITIONER

## 2024-09-04 PROCEDURE — 1123F ACP DISCUSS/DSCN MKR DOCD: CPT | Performed by: NURSE PRACTITIONER

## 2024-09-04 PROCEDURE — 1036F TOBACCO NON-USER: CPT | Performed by: NURSE PRACTITIONER

## 2024-09-04 PROCEDURE — 99214 OFFICE O/P EST MOD 30 MIN: CPT | Performed by: NURSE PRACTITIONER

## 2024-09-04 PROCEDURE — G8427 DOCREV CUR MEDS BY ELIG CLIN: HCPCS | Performed by: NURSE PRACTITIONER

## 2024-09-04 PROCEDURE — G8417 CALC BMI ABV UP PARAM F/U: HCPCS | Performed by: NURSE PRACTITIONER

## 2024-09-04 NOTE — PROGRESS NOTES
[facility] [place] [facility]  [facility] UROLOGY  [address]  [address]  [place] [address]  Dept: [phone]  Loc: [phone]    Visit Date: 9/4/2024        HPI:     Polo Mariano is a 69 y.o. male who presents today for:  Chief Complaint   Patient presents with    Prostate Cancer    Erectile Dysfunction       Patient presents to urology clinic annual follow-up.     Mr. Mariano is doing very well. He is here with annual PSA. PSA undetectable. He report happy with urination. Only using 1 PPD for HARPREET.     He previously took sildenafil for ED however caused headaches and was not effective. We discussed Trimix and will call office if interested.     Prostate cancer     FINAL DIAGNOSIS:   Prostate and bilateral pelvic lymph nodes, radical prostatectomy and   bilateral pelvic lymphadenectomy:             Prostatic adenocarcinoma, acinar type, Kelly score 3+4 = 7   (grade group 2 of 5).    Carcinoma involves approximately 10% of the submitted tissue and is   confined to       the prostate.       Negative for extraprostatic extension, bladder neck invasion,   seminal vesicle       invasion, and margin involvement.       Six lymph nodes with no evidence of malignancy.  (0/6)       Nodular prostatic hyperplasia with mixed inflammation.       pT2, pN0         HARPREET  stable  Wears one pad     ED  Still not at goal  Se from pde 5i    Current Outpatient Medications   Medication Sig Dispense Refill    amoxicillin (AMOXIL) 500 MG capsule Take 2 capsules by mouth 3 times daily for 10 days 60 capsule 0    amLODIPine (NORVASC) 10 MG tablet Take 1 tablet by mouth daily 90 tablet 3    lisinopril (PRINIVIL;ZESTRIL) 30 MG tablet Take 1 tablet by mouth daily 90 tablet 3     No current facility-administered medications for this visit.       Past Medical History  Polo  has a past medical history of Chronic venous insufficiency of lower extremity, Dyslipidemia, History of prostate cancer, Hypertension,

## 2024-09-09 DIAGNOSIS — H81.13 BENIGN PAROXYSMAL POSITIONAL VERTIGO DUE TO BILATERAL VESTIBULAR DISORDER: ICD-10-CM

## 2024-09-09 RX ORDER — MECLIZINE HYDROCHLORIDE 25 MG/1
25 TABLET ORAL 3 TIMES DAILY PRN
Qty: 30 TABLET | Refills: 0 | Status: SHIPPED | OUTPATIENT
Start: 2024-09-09 | End: 2024-09-19

## 2024-09-24 ENCOUNTER — LAB (OUTPATIENT)
Dept: FAMILY MEDICINE CLINIC | Age: 69
End: 2024-09-24

## 2024-09-24 DIAGNOSIS — Z23 FLU VACCINE NEED: Primary | ICD-10-CM

## 2024-09-30 ENCOUNTER — OFFICE VISIT (OUTPATIENT)
Dept: FAMILY MEDICINE CLINIC | Age: 69
End: 2024-09-30
Payer: MEDICARE

## 2024-09-30 VITALS
BODY MASS INDEX: 40.09 KG/M2 | DIASTOLIC BLOOD PRESSURE: 84 MMHG | OXYGEN SATURATION: 98 % | SYSTOLIC BLOOD PRESSURE: 134 MMHG | TEMPERATURE: 97.7 F | HEIGHT: 72 IN | RESPIRATION RATE: 18 BRPM | WEIGHT: 296 LBS | HEART RATE: 83 BPM

## 2024-09-30 DIAGNOSIS — G57.02 PIRIFORMIS SYNDROME, LEFT: ICD-10-CM

## 2024-09-30 DIAGNOSIS — H81.13 BENIGN PAROXYSMAL POSITIONAL VERTIGO DUE TO BILATERAL VESTIBULAR DISORDER: Primary | ICD-10-CM

## 2024-09-30 PROCEDURE — G8427 DOCREV CUR MEDS BY ELIG CLIN: HCPCS | Performed by: FAMILY MEDICINE

## 2024-09-30 PROCEDURE — 3075F SYST BP GE 130 - 139MM HG: CPT | Performed by: FAMILY MEDICINE

## 2024-09-30 PROCEDURE — 99213 OFFICE O/P EST LOW 20 MIN: CPT | Performed by: FAMILY MEDICINE

## 2024-09-30 PROCEDURE — 1036F TOBACCO NON-USER: CPT | Performed by: FAMILY MEDICINE

## 2024-09-30 PROCEDURE — 1123F ACP DISCUSS/DSCN MKR DOCD: CPT | Performed by: FAMILY MEDICINE

## 2024-09-30 PROCEDURE — 3079F DIAST BP 80-89 MM HG: CPT | Performed by: FAMILY MEDICINE

## 2024-09-30 PROCEDURE — 3017F COLORECTAL CA SCREEN DOC REV: CPT | Performed by: FAMILY MEDICINE

## 2024-09-30 PROCEDURE — G8417 CALC BMI ABV UP PARAM F/U: HCPCS | Performed by: FAMILY MEDICINE

## 2024-09-30 RX ORDER — MECLIZINE HYDROCHLORIDE 25 MG/1
25 TABLET ORAL 3 TIMES DAILY PRN
Qty: 30 TABLET | Refills: 0 | Status: SHIPPED | OUTPATIENT
Start: 2024-09-30 | End: 2024-10-10

## 2024-09-30 NOTE — PROGRESS NOTES
Screening - never smoker    Tetanus - to get at pharmacy per medicare rules  Influenza Vaccine - UTD MARCH 2024  Pneumonia Vaccine - UTD x 2  Zoster - to get at pharmacy per medicare rules     PSA testing discussion - follows with urology  Lab Results   Component Value Date    PSA <0.02 09/03/2024    PSA <0.02 07/24/2023    PSA <0.02 01/17/2023       AAA Screening - never smoker      Current Outpatient Medications   Medication Sig Dispense Refill    meclizine (ANTIVERT) 25 MG tablet Take 1 tablet by mouth 3 times daily as needed for Dizziness 30 tablet 0    amLODIPine (NORVASC) 10 MG tablet Take 1 tablet by mouth daily 90 tablet 3    lisinopril (PRINIVIL;ZESTRIL) 30 MG tablet Take 1 tablet by mouth daily 90 tablet 3     No current facility-administered medications for this visit.     Orders Placed This Encounter   Medications    meclizine (ANTIVERT) 25 MG tablet     Sig: Take 1 tablet by mouth 3 times daily as needed for Dizziness     Dispense:  30 tablet     Refill:  0       All medications reviewed and reconciled, including OTC and herbal medications. Updated list given to patient.       Patient Active Problem List    Diagnosis Date Noted    Chronic venous insufficiency of lower extremity 08/08/2024    Dyslipidemia 04/15/2024    History of prostate cancer 10/18/2021    Hypertension, essential     Obesity (BMI 30-39.9)     Psoriasis        Past Medical History:   Diagnosis Date    Chronic venous insufficiency of lower extremity 08/08/2024    Dyslipidemia 04/15/2024    History of prostate cancer 10/18/2021    Hypertension, essential     Obesity (BMI 30-39.9)     Psoriasis        Past Surgical History:   Procedure Laterality Date    KNEE ARTHROSCOPY      right    PROSTATE BIOPSY  2021    PROSTATECTOMY Bilateral 12/2/2021    ROBOTIC ASSISTED LAPAROSCOPIC RADICAL PROSTECTOMY WITH BILATERAL LYMPH NODE DISSECTION performed by Phillip Villar MD at [facility] OR    ULNAR TUNNEL RELEASE         No Known Allergies      Social History

## 2024-10-10 ENCOUNTER — HOSPITAL ENCOUNTER (EMERGENCY)
Age: 69
Discharge: HOME | End: 2024-10-10
Payer: MEDICARE

## 2024-10-10 VITALS
RESPIRATION RATE: 16 BRPM | DIASTOLIC BLOOD PRESSURE: 77 MMHG | SYSTOLIC BLOOD PRESSURE: 144 MMHG | TEMPERATURE: 97.3 F | OXYGEN SATURATION: 95 % | HEART RATE: 80 BPM

## 2024-10-10 DIAGNOSIS — J06.9 VIRAL URI: Primary | ICD-10-CM

## 2024-10-10 PROCEDURE — 99213 OFFICE O/P EST LOW 20 MIN: CPT

## 2024-10-10 PROCEDURE — 99212 OFFICE O/P EST SF 10 MIN: CPT | Performed by: NURSE PRACTITIONER

## 2024-10-10 ASSESSMENT — ENCOUNTER SYMPTOMS
NAUSEA: 0
COUGH: 1
SHORTNESS OF BREATH: 0
SORE THROAT: 1
CHEST TIGHTNESS: 0
DIARRHEA: 0
RHINORRHEA: 1
VOMITING: 0
SINUS PRESSURE: 1

## 2024-10-10 NOTE — ED NOTES
To [facility] with complaints of headache, congestion, sore throat, facial pain. Started Tuesday.      Olesya Cortes, RN  10/10/24 [id number]

## 2024-10-10 NOTE — ED PROVIDER NOTES
[facility] URGENT CARE  Urgent Care Encounter       CHIEF COMPLAINT       Chief Complaint   Patient presents with    Headache    Congestion    Pharyngitis       Nurses Notes reviewed and I agree except as noted in the HPI.  HISTORY OF PRESENT ILLNESS   Polo Mariano is a 69 y.o. male who presents to the [facility] urgent care for evaluation of sinus pressure.  Reports symptoms started 2 days ago.  Reports congestion, rhinorrhea, scratchy throat, headache, and mild cough.  He does report that there have been people coughing at work.  He denies fever or chills.  Denies fatigue or myalgia.    The history is provided by the patient. No  was used.       REVIEW OF SYSTEMS     Review of Systems   Constitutional:  Negative for activity change, appetite change, chills, fatigue and fever.   HENT:  Positive for congestion, postnasal drip, rhinorrhea, sinus pressure and sore throat. Negative for ear discharge and ear pain.    Respiratory:  Positive for cough. Negative for chest tightness and shortness of breath.    Cardiovascular:  Negative for chest pain.   Gastrointestinal:  Negative for diarrhea, nausea and vomiting.   Genitourinary:  Negative for dysuria.   Skin:  Negative for rash.   Allergic/Immunologic: Negative for environmental allergies and food allergies.   Neurological:  Positive for headaches. Negative for dizziness.       PAST MEDICAL HISTORY         Diagnosis Date    Chronic venous insufficiency of lower extremity 08/08/2024    Dyslipidemia 04/15/2024    History of prostate cancer 10/18/2021    Hypertension, essential     Obesity (BMI 30-39.9)     Psoriasis        SURGICALHISTORY     Patient  has a past surgical history that includes Knee arthroscopy; Ulnar tunnel release; Prostate biopsy (2021); and Prostatectomy (Bilateral, 12/2/2021).    CURRENT MEDICATIONS       Discharge Medication List as of 10/10/2024  1:47 PM        CONTINUE these medications which have NOT CHANGED     Details   meclizine (ANTIVERT) 25 MG tablet Take 1 tablet by mouth 3 times daily as needed for Dizziness, Disp-30 tablet, R-0Normal      amLODIPine (NORVASC) 10 MG tablet Take 1 tablet by mouth daily, Disp-90 tablet, R-3Normal      lisinopril (PRINIVIL;ZESTRIL) 30 MG tablet Take 1 tablet by mouth daily, Disp-90 tablet, R-3Normal             ALLERGIES     Patient is has No Known Allergies.    Patients   Immunization History   Administered Date(s) Administered    COVID-19, MODERNA BLUE border, Primary or Immunocompromised, (age 12y+), IM, 100 mcg/0.5mL 03/13/2021, 04/08/2021    COVID-19, MODERNA Booster BLUE border, (age 18y+), IM, 50mcg/0.25mL 12/23/2021    Influenza, FLUAD, (age 65 y+), IM, Quadv, 0.5mL 11/09/2021, 03/08/2024    Influenza, FLUCELVAX, (age 6 mo+) IM, Trivalent PF, 0.5mL 09/24/2024    Influenza, FLUZONE High Dose, (age 65 y+), IM, Trivalent PF, 0.5mL 12/06/2020    Pneumococcal, PCV20, PREVNAR 20, (age 6w+), IM, 0.5mL 03/08/2024    Pneumococcal, PPSV23, PNEUMOVAX 23, (age 2y+), SC/IM, 0.5mL 09/08/2020       FAMILY HISTORY     Patient's family history includes Heart Disease in his father.    SOCIAL HISTORY     Patient  reports that he has never smoked. He has never used smokeless tobacco. He reports that he does not drink alcohol and does not use drugs.    PHYSICAL EXAM     ED TRIAGE VITALS  BP: (!) 144/77, Temp: 97.3 °F (36.3 °C), Pulse: 80, Respirations: 16, SpO2: 95 %,Estimated body mass index is 40.14 kg/m² as calculated from the following:    Height as of 9/30/24: 1.829 m (6' 0.01\").    Weight as of 9/30/24: 134.3 kg (296 lb).,No LMP for male patient.    Physical Exam  Vitals and nursing note reviewed.   Constitutional:       General: He is not in acute distress.     Appearance: Normal appearance. He is not ill-appearing, toxic-appearing or diaphoretic.   HENT:      Head: Normocephalic.      Right Ear: Ear canal and external ear normal.      Left Ear: Ear canal and external ear normal.      Nose:

## 2024-11-27 ENCOUNTER — HOSPITAL ENCOUNTER (EMERGENCY)
Age: 69
Discharge: HOME | End: 2024-11-27
Payer: MEDICARE

## 2024-11-27 VITALS
RESPIRATION RATE: 16 BRPM | HEART RATE: 76 BPM | OXYGEN SATURATION: 96 % | DIASTOLIC BLOOD PRESSURE: 83 MMHG | TEMPERATURE: 97.7 F | SYSTOLIC BLOOD PRESSURE: 137 MMHG

## 2024-11-27 DIAGNOSIS — H66.92 LEFT OTITIS MEDIA, UNSPECIFIED OTITIS MEDIA TYPE: Primary | ICD-10-CM

## 2024-11-27 DIAGNOSIS — H61.21 IMPACTED CERUMEN OF RIGHT EAR: ICD-10-CM

## 2024-11-27 DIAGNOSIS — J00 ACUTE NASOPHARYNGITIS: ICD-10-CM

## 2024-11-27 PROCEDURE — 99213 OFFICE O/P EST LOW 20 MIN: CPT

## 2024-11-27 PROCEDURE — 99213 OFFICE O/P EST LOW 20 MIN: CPT | Performed by: NURSE PRACTITIONER

## 2024-11-27 RX ORDER — AMOXICILLIN 500 MG/1
500 CAPSULE ORAL 2 TIMES DAILY
Qty: 14 CAPSULE | Refills: 0 | Status: SHIPPED | OUTPATIENT
Start: 2024-11-27 | End: 2024-12-04

## 2024-11-27 ASSESSMENT — ENCOUNTER SYMPTOMS
CHOKING: 0
SHORTNESS OF BREATH: 0
STRIDOR: 0
COUGH: 1
SWOLLEN GLANDS: 0
CHEST TIGHTNESS: 0
SORE THROAT: 1
SINUS PAIN: 1
APNEA: 0
RHINORRHEA: 1
WHEEZING: 0

## 2024-11-27 ASSESSMENT — PAIN DESCRIPTION - FREQUENCY: FREQUENCY: CONTINUOUS

## 2024-11-27 ASSESSMENT — PAIN DESCRIPTION - LOCATION: LOCATION: FACE

## 2024-11-27 ASSESSMENT — PAIN DESCRIPTION - PAIN TYPE: TYPE: ACUTE PAIN

## 2024-11-27 ASSESSMENT — PAIN SCALES - GENERAL: PAINLEVEL_OUTOF10: 5

## 2024-11-27 ASSESSMENT — PAIN DESCRIPTION - DESCRIPTORS: DESCRIPTORS: PRESSURE

## 2024-11-27 ASSESSMENT — PAIN - FUNCTIONAL ASSESSMENT: PAIN_FUNCTIONAL_ASSESSMENT: 0-10

## 2024-11-27 NOTE — DISCHARGE INSTRUCTIONS
Suggestions for symptom management that are available over the counter.   If you have questions regarding allergies or contraindications to use, please speak to the pharmacy staff or your family provider.    For fevers or pain: acetaminophen (Tylenol), ibuprofen (Motrin)  For dry cough: medications containing dextromethorphan, such as Delsym, Robitussin DM or Mucinex DM and medicated throat lozenges  For congestion or sinus pressure: decongestant nasal sprays, such as Afrin (for up to 3 days), medications containing guaifenesin to help break up mucus, such as Mucinex or Robitussin, nasal steroid sprays, such as Flonase, Sensimist, Rhinocort or Nasonex and saline nasal sprays, neti pot or sinus rinse bottle  For runny nose, sneezing or watery/itchy eyes: less sedating antihistamines, such as loratidine (Claritin), fexofenadine (Allegra) or Cetirizine (Zyrtec) and antihistamine eye drops, such as ketotifen (Zaditor, Alaway) or olopatadine (Pataday)  For sore throat:  Chloraseptic throat spray or sore throat lozenges or  Mucinex Instasoothe Sore Throat & Pain Cherry Spray  If you have high blood pressure, a brand like Coricidin HBP may be an option.you should avoid medications containing pseudoephedrine or phenylephrine, such as Sudafed,  running a humidifier in your bedroom may be helpful for many of your symptoms.  If your cough is keeping you awake at night, you can try raising your head with an extra pillow.  If the skin around your nose and lips becomes sore, you can put some petroleum jelly on the area.      Suggestions for over-the-counter supplements to help your immune system, if you have questions regarding allergies or contraindications to use, please speak to the pharmacy staff or your family provider.    Multi-vitamin/multi-mineral daily   Vitamin D3 3000 IU daily  Zinc 30 mg daily  Vitamin C 1000 mg twice daily  B-100 complex as daily as directed on bottle  Probiotic/Prebiotic rosas  Gargle with  mouthwash 3 times daily, may use Scope, Crest, or Listerine.    COLD-EEZE  over the counter: Use as directed on package. Zinc gluconate lozenges  are used to help make cold symptoms less severe or shorter in duration. This includes sore throat, cough, sneezing, stuffy nose, and a hoarse voice.    Adequate nutrition, hydration, and rest are also important to good health and recovery.

## 2024-11-27 NOTE — ED NOTES
Patient walked to room 5 for sinus congestion and pressure onset Monday, denies a fever. Patient also has some chest tightness he feels it going into his chest denies a cough at this time.      Zaida Zamora, RN  11/27/24 [id number]

## 2024-11-27 NOTE — ED PROVIDER NOTES
[facility] URGENT CARE  Urgent Care Encounter      CHIEF COMPLAINT       Chief Complaint   Patient presents with    Nasal Congestion     Onset Monday, pressure in sinus and tightness in chest        Nurses Notes reviewed and I agree except as noted in the HPI.  HISTORY OFPRESENT ILLNESS   Polo Mariano is a 69 y.o.  The history is provided by the patient. No  was used.   Cold Symptoms  Presenting symptoms: congestion, cough, ear pain, fatigue, rhinorrhea and sore throat    Presenting symptoms: no facial pain and no fever    Severity:  Severe  Onset quality:  Sudden  Duration:  2 days  Timing:  Constant  Progression:  Worsening  Chronicity:  New  Relieved by:  Nothing  Worsened by:  Certain positions  Ineffective treatments:  OTC medications  Associated symptoms: headaches and sinus pain    Associated symptoms: no arthralgias, no myalgias, no neck pain, no sneezing, no swollen glands and no wheezing    Risk factors: not elderly, no chronic cardiac disease, no chronic kidney disease, no chronic respiratory disease, no diabetes mellitus, no immunosuppression, no recent illness, no recent travel and no sick contacts        REVIEW OF SYSTEMS     Review of Systems   Constitutional:  Positive for fatigue. Negative for activity change, appetite change, chills, diaphoresis and fever.   HENT:  Positive for congestion, ear pain, postnasal drip, rhinorrhea, sinus pain and sore throat. Negative for sneezing.    Respiratory:  Positive for cough. Negative for apnea, choking, chest tightness, shortness of breath, wheezing and stridor.    Cardiovascular:  Negative for chest pain, palpitations and leg swelling.   Musculoskeletal:  Negative for arthralgias, myalgias and neck pain.   Neurological:  Positive for headaches. Negative for dizziness and light-headedness.       PAST MEDICAL HISTORY         Diagnosis Date    Chronic venous insufficiency of lower extremity 08/08/2024    Dyslipidemia 04/15/2024

## 2025-01-16 ENCOUNTER — APPOINTMENT (OUTPATIENT)
Dept: CT IMAGING | Age: 70
End: 2025-01-16
Payer: MEDICARE

## 2025-01-16 ENCOUNTER — HOSPITAL ENCOUNTER (EMERGENCY)
Age: 70
Discharge: HOME | End: 2025-01-16
Attending: STUDENT IN AN ORGANIZED HEALTH CARE EDUCATION/TRAINING PROGRAM
Payer: MEDICARE

## 2025-01-16 VITALS
OXYGEN SATURATION: 96 % | DIASTOLIC BLOOD PRESSURE: 71 MMHG | HEIGHT: 72 IN | TEMPERATURE: 98.3 F | HEART RATE: 73 BPM | BODY MASS INDEX: 38.6 KG/M2 | RESPIRATION RATE: 18 BRPM | WEIGHT: 285 LBS | SYSTOLIC BLOOD PRESSURE: 117 MMHG

## 2025-01-16 DIAGNOSIS — N39.0 URINARY TRACT INFECTION WITH HEMATURIA, SITE UNSPECIFIED: ICD-10-CM

## 2025-01-16 DIAGNOSIS — R31.9 URINARY TRACT INFECTION WITH HEMATURIA, SITE UNSPECIFIED: ICD-10-CM

## 2025-01-16 DIAGNOSIS — R10.9 LEFT FLANK PAIN: Primary | ICD-10-CM

## 2025-01-16 LAB
ANION GAP SERPL CALC-SCNC: 11 MEQ/L (ref 8–16)
BACTERIA URNS QL MICRO: ABNORMAL /HPF
BASOPHILS ABSOLUTE: 0.1 THOU/MM3 (ref 0–0.1)
BASOPHILS NFR BLD AUTO: 1.3 %
BILIRUB UR QL STRIP.AUTO: NEGATIVE
BUN SERPL-MCNC: 17 MG/DL (ref 7–22)
CALCIUM SERPL-MCNC: 9.1 MG/DL (ref 8.5–10.5)
CASTS #/AREA URNS LPF: ABNORMAL /LPF
CASTS 2: ABNORMAL /LPF
CHARACTER UR: CLEAR
CHLORIDE SERPL-SCNC: 105 MEQ/L (ref 98–111)
CO2 SERPL-SCNC: 22 MEQ/L (ref 23–33)
COLOR, UA: YELLOW
CREAT SERPL-MCNC: 0.8 MG/DL (ref 0.4–1.2)
CRYSTALS URNS MICRO: ABNORMAL
DEPRECATED RDW RBC AUTO: 43.5 FL (ref 35–45)
EOSINOPHIL NFR BLD AUTO: 3.2 %
EOSINOPHILS ABSOLUTE: 0.2 THOU/MM3 (ref 0–0.4)
EPITHELIAL CELLS, UA: ABNORMAL /HPF
ERYTHROCYTE [DISTWIDTH] IN BLOOD BY AUTOMATED COUNT: 13.7 % (ref 11.5–14.5)
GFR SERPL CREATININE-BSD FRML MDRD: > 90 ML/MIN/1.73M2
GLUCOSE SERPL-MCNC: 112 MG/DL (ref 70–108)
GLUCOSE UR QL STRIP.AUTO: NEGATIVE MG/DL
HCT VFR BLD AUTO: 43.2 % (ref 42–52)
HGB BLD-MCNC: 14.3 GM/DL (ref 14–18)
HGB UR QL STRIP.AUTO: ABNORMAL
IMM GRANULOCYTES # BLD AUTO: 0.03 THOU/MM3 (ref 0–0.07)
IMM GRANULOCYTES NFR BLD AUTO: 0.5 %
KETONES UR QL STRIP.AUTO: NEGATIVE
LYMPHOCYTES ABSOLUTE: 2 THOU/MM3 (ref 1–4.8)
LYMPHOCYTES NFR BLD AUTO: 32.7 %
MCH RBC QN AUTO: 28.8 PG (ref 26–33)
MCHC RBC AUTO-ENTMCNC: 33.1 GM/DL (ref 32.2–35.5)
MCV RBC AUTO: 87.1 FL (ref 80–94)
MISCELLANEOUS 2: ABNORMAL
MONOCYTES ABSOLUTE: 0.7 THOU/MM3 (ref 0.4–1.3)
MONOCYTES NFR BLD AUTO: 10.6 %
NEUTROPHILS ABSOLUTE: 3.2 THOU/MM3 (ref 1.8–7.7)
NEUTROPHILS NFR BLD AUTO: 51.7 %
NITRITE UR QL STRIP: NEGATIVE
NRBC BLD AUTO-RTO: 0 /100 WBC
OSMOLALITY SERPL CALC.SUM OF ELEC: 278 MOSMOL/KG (ref 275–300)
PH UR STRIP.AUTO: 5.5 [PH] (ref 5–9)
PLATELET # BLD AUTO: 266 THOU/MM3 (ref 130–400)
PMV BLD AUTO: 10 FL (ref 9.4–12.4)
POTASSIUM SERPL-SCNC: 4.4 MEQ/L (ref 3.5–5.2)
PROT UR STRIP.AUTO-MCNC: NEGATIVE MG/DL
RBC # BLD AUTO: 4.96 MILL/MM3 (ref 4.7–6.1)
RBC URINE: ABNORMAL /HPF
RENAL EPI CELLS #/AREA URNS HPF: ABNORMAL /[HPF]
SODIUM SERPL-SCNC: 138 MEQ/L (ref 135–145)
SP GR UR REFRACT.AUTO: 1.01 (ref 1–1.03)
UROBILINOGEN, URINE: 0.2 EU/DL (ref 0–1)
WBC # BLD AUTO: 6.2 THOU/MM3 (ref 4.8–10.8)
WBC #/AREA URNS HPF: ABNORMAL /HPF
WBC #/AREA URNS HPF: ABNORMAL /[HPF]
YEAST LIKE FUNGI URNS QL MICRO: ABNORMAL

## 2025-01-16 PROCEDURE — 85025 COMPLETE CBC W/AUTO DIFF WBC: CPT

## 2025-01-16 PROCEDURE — 81001 URINALYSIS AUTO W/SCOPE: CPT

## 2025-01-16 PROCEDURE — 99284 EMERGENCY DEPT VISIT MOD MDM: CPT

## 2025-01-16 PROCEDURE — 2500000003 HC RX 250 WO HCPCS

## 2025-01-16 PROCEDURE — 6360000002 HC RX W HCPCS

## 2025-01-16 PROCEDURE — 87077 CULTURE AEROBIC IDENTIFY: CPT

## 2025-01-16 PROCEDURE — 96375 TX/PRO/DX INJ NEW DRUG ADDON: CPT

## 2025-01-16 PROCEDURE — 87186 SC STD MICRODIL/AGAR DIL: CPT

## 2025-01-16 PROCEDURE — 36415 COLL VENOUS BLD VENIPUNCTURE: CPT

## 2025-01-16 PROCEDURE — 6360000002 HC RX W HCPCS: Performed by: STUDENT IN AN ORGANIZED HEALTH CARE EDUCATION/TRAINING PROGRAM

## 2025-01-16 PROCEDURE — 80048 BASIC METABOLIC PNL TOTAL CA: CPT

## 2025-01-16 PROCEDURE — 96374 THER/PROPH/DIAG INJ IV PUSH: CPT

## 2025-01-16 PROCEDURE — 87086 URINE CULTURE/COLONY COUNT: CPT

## 2025-01-16 PROCEDURE — 74176 CT ABD & PELVIS W/O CONTRAST: CPT

## 2025-01-16 RX ORDER — CEFUROXIME AXETIL 500 MG/1
500 TABLET ORAL 2 TIMES DAILY
Qty: 20 TABLET | Refills: 0 | Status: SHIPPED | OUTPATIENT
Start: 2025-01-16 | End: 2025-01-26

## 2025-01-16 RX ORDER — KETOROLAC TROMETHAMINE 30 MG/ML
30 INJECTION, SOLUTION INTRAMUSCULAR; INTRAVENOUS ONCE
Status: DISCONTINUED | OUTPATIENT
Start: 2025-01-16 | End: 2025-01-16

## 2025-01-16 RX ORDER — KETOROLAC TROMETHAMINE 30 MG/ML
15 INJECTION, SOLUTION INTRAMUSCULAR; INTRAVENOUS ONCE
Status: COMPLETED | OUTPATIENT
Start: 2025-01-16 | End: 2025-01-16

## 2025-01-16 RX ADMIN — WATER 1000 MG: 1 INJECTION INTRAMUSCULAR; INTRAVENOUS; SUBCUTANEOUS at 08:50

## 2025-01-16 RX ADMIN — KETOROLAC TROMETHAMINE 15 MG: 30 INJECTION, SOLUTION INTRAMUSCULAR at 08:06

## 2025-01-16 ASSESSMENT — PAIN SCALES - GENERAL
PAINLEVEL_OUTOF10: 6
PAINLEVEL_OUTOF10: 7

## 2025-01-16 ASSESSMENT — PAIN DESCRIPTION - ORIENTATION: ORIENTATION: LEFT

## 2025-01-16 ASSESSMENT — PAIN - FUNCTIONAL ASSESSMENT: PAIN_FUNCTIONAL_ASSESSMENT: 0-10

## 2025-01-16 ASSESSMENT — PAIN DESCRIPTION - LOCATION: LOCATION: FLANK

## 2025-01-16 NOTE — ED NOTES
Pt states he is unable to provide urine specimen at this time. Pt will notify staff when he can. Call light in reach.

## 2025-01-16 NOTE — DISCHARGE INSTRUCTIONS
You were seen today for flank pain this is likley due to a urinary tract infection.    Take antibiotics as prescribed.    Okay to return to work tomorrow.    Call your Urologist, Dr. Villar to setup a follow-up appointment.    Follow-up with Doug Goldsmith, DO      If symptoms are worse or other new concerns please come back to the Emergency Department for re-evaluation.

## 2025-01-16 NOTE — ED NOTES
Pt ambulated to restroom and back with no assistance. Steady gait. Updated on plan of care. Voiced no needs. Call light in reach.

## 2025-01-16 NOTE — ED PROVIDER NOTES
[facility]  EMERGENCY DEPARTMENT ENCOUNTER          Pt Name: Polo Mariano  MRN: [id number]  Birthdate 1955  Date of evaluation: 1/16/2025  Treating Resident Physician: Abhay Plascencia MD  Supervising Physician: Devon Peres MD    History obtained from the patient.     CHIEF COMPLAINT       Chief Complaint   Patient presents with    Flank Pain       HISTORY OF PRESENT ILLNESS    Polo Mariano is a 69 y.o. male with a PMH of prostate cancer s/p prostatectomy and kidney stones who presents to the emergency department from home with complaint of sudden onset flank pain and urinary hesitancy and dribbling. Pain 5/10. No hematuria, dysuria, recent sexual encounter. Left localized flank pain. Denies smoking, EtOH, drug use, weight loss. Follows with Urology. ROS unremarkable.       Triage notes and Nursing notes were reviewed by myself.  Any discrepancies are addressed above.    PAST MEDICAL HISTORY     Past Medical History:   Diagnosis Date    Chronic venous insufficiency of lower extremity 08/08/2024    Dyslipidemia 04/15/2024    History of prostate cancer 10/18/2021    Hypertension, essential     Obesity (BMI 30-39.9)     Psoriasis        SURGICAL HISTORY       Past Surgical History:   Procedure Laterality Date    KNEE ARTHROSCOPY      right    PROSTATE BIOPSY  2021    PROSTATECTOMY Bilateral 12/2/2021    ROBOTIC ASSISTED LAPAROSCOPIC RADICAL PROSTECTOMY WITH BILATERAL LYMPH NODE DISSECTION performed by Phillip Villar MD at [facility] OR    ULNAR TUNNEL RELEASE         CURRENT MEDICATIONS       Discharge Medication List as of 1/16/2025  8:44 AM        CONTINUE these medications which have NOT CHANGED    Details   amLODIPine (NORVASC) 10 MG tablet Take 1 tablet by mouth daily, Disp-90 tablet, R-3Normal      lisinopril (PRINIVIL;ZESTRIL) 30 MG tablet Take 1 tablet by mouth daily, Disp-90 tablet, R-3Normal             ALLERGIES     Patient has no known allergies.    FAMILY

## 2025-01-16 NOTE — ED TRIAGE NOTES
Pt arrives to ED from home for c/o left sided flank pain. Pt states he felt okay when he went to bed but woke up with pain to left flank. Pt reports he \"normally has a good flow when he pees but the pee didn't want to pass when he went to the bathroom\".

## 2025-01-18 LAB
BACTERIA UR CULT: ABNORMAL
ORGANISM: ABNORMAL

## 2025-02-25 ENCOUNTER — TELEPHONE (OUTPATIENT)
Dept: FAMILY MEDICINE CLINIC | Age: 70
End: 2025-02-25

## 2025-02-25 DIAGNOSIS — J11.1 INFLUENZA: Primary | ICD-10-CM

## 2025-02-25 RX ORDER — OSELTAMIVIR PHOSPHATE 75 MG/1
75 CAPSULE ORAL 2 TIMES DAILY
Qty: 10 CAPSULE | Refills: 0 | Status: SHIPPED | OUTPATIENT
Start: 2025-02-25 | End: 2025-03-02

## 2025-02-25 NOTE — TELEPHONE ENCOUNTER
Pt called in stating he has, diarrhea, sore throat, runny nose, no vomiting, fever.      Can you send something in please        [facility] - [facility] [address]

## 2025-02-25 NOTE — TELEPHONE ENCOUNTER
Sxs started last night  No coughing  No SOB  Pt stated he is not constipated. Last BM this morning, no blood.

## 2025-02-25 NOTE — TELEPHONE ENCOUNTER
Ok  Likely has flu based on sxs and when they started  I've sent in Tamiflu  F/u if no better     Diagnosis Orders   1. Influenza  oseltamivir (TAMIFLU) 75 MG capsule

## 2025-02-27 ENCOUNTER — HOSPITAL ENCOUNTER (EMERGENCY)
Age: 70
Discharge: HOME | End: 2025-02-27
Payer: MEDICARE

## 2025-02-27 VITALS
OXYGEN SATURATION: 96 % | TEMPERATURE: 98.2 F | HEART RATE: 73 BPM | RESPIRATION RATE: 18 BRPM | SYSTOLIC BLOOD PRESSURE: 131 MMHG | DIASTOLIC BLOOD PRESSURE: 74 MMHG

## 2025-02-27 DIAGNOSIS — J10.1 INFLUENZA B: Primary | ICD-10-CM

## 2025-02-27 LAB
FLUAV AG SPEC QL: NEGATIVE
FLUBV AG SPEC QL: POSITIVE

## 2025-02-27 PROCEDURE — 99213 OFFICE O/P EST LOW 20 MIN: CPT

## 2025-02-27 PROCEDURE — 87804 INFLUENZA ASSAY W/OPTIC: CPT

## 2025-02-27 RX ORDER — ONDANSETRON 4 MG/1
4 TABLET, ORALLY DISINTEGRATING ORAL 3 TIMES DAILY PRN
Qty: 21 TABLET | Refills: 0 | Status: SHIPPED | OUTPATIENT
Start: 2025-02-27

## 2025-02-27 ASSESSMENT — ENCOUNTER SYMPTOMS
ABDOMINAL PAIN: 1
VOMITING: 1
EYES NEGATIVE: 1
DIARRHEA: 1
RESPIRATORY NEGATIVE: 1
NAUSEA: 1

## 2025-02-27 ASSESSMENT — PAIN SCALES - GENERAL: PAINLEVEL_OUTOF10: 2

## 2025-02-27 ASSESSMENT — PAIN DESCRIPTION - LOCATION: LOCATION: ABDOMEN

## 2025-02-27 ASSESSMENT — PAIN DESCRIPTION - FREQUENCY: FREQUENCY: CONTINUOUS

## 2025-02-27 ASSESSMENT — PAIN DESCRIPTION - ONSET: ONSET: ON-GOING

## 2025-02-27 ASSESSMENT — PAIN DESCRIPTION - DESCRIPTORS: DESCRIPTORS: CRAMPING;ACHING

## 2025-02-27 ASSESSMENT — PAIN - FUNCTIONAL ASSESSMENT: PAIN_FUNCTIONAL_ASSESSMENT: 0-10

## 2025-02-27 ASSESSMENT — PAIN DESCRIPTION - PAIN TYPE: TYPE: ACUTE PAIN

## 2025-02-27 NOTE — ED TRIAGE NOTES
Pt presents to urgent care with c/o HA, diarrhea, upset stomach, and chills since Tuesday. Pt states he called his PCP who sent in Tamiflu based on flu like symptoms. Pt states he took his first dose yesterday. Pt states his PCP said if he isn't feeling any better to be seen. Pt denies any other OTC medications.

## 2025-02-27 NOTE — ED PROVIDER NOTES
[facility] URGENT CARE  UrgentCare Encounter      CHIEFCOMPLAINT       Chief Complaint   Patient presents with    Headache    Diarrhea       Nurses Notes reviewed and I agree except as noted in the HPI.  HISTORY OF PRESENT ILLNESS   Polo Mariano is a 69 y.o. male who presents today for cough, headache, diarrhea, chills, abdominal cramping.  States his symptoms started on Tuesday.  Patient did call the PCP and was sent and Tamiflu.  At that time patient was told if he did not feel any better he is to go see the PCP.  Patient states he did call office today but they do not have any openings so he was told to come here to be seen.  Patient states he does not feel any better and he still having all of these \"flulike symptoms\" states he is still taking the Tamiflu.    REVIEW OF SYSTEMS     Review of Systems   Constitutional:  Positive for chills.   Eyes: Negative.    Respiratory: Negative.     Cardiovascular: Negative.    Gastrointestinal:  Positive for abdominal pain, diarrhea, nausea and vomiting.   Genitourinary: Negative.    Musculoskeletal: Negative.    Skin: Negative.    Neurological: Negative.        PAST MEDICAL HISTORY         Diagnosis Date    Chronic venous insufficiency of lower extremity 08/08/2024    Dyslipidemia 04/15/2024    History of prostate cancer 10/18/2021    Hypertension, essential     Obesity (BMI 30-39.9)     Psoriasis        SURGICAL HISTORY     Patient  has a past surgical history that includes Knee arthroscopy; Ulnar tunnel release; Prostate biopsy (2021); and Prostatectomy (Bilateral, 12/2/2021).    CURRENT MEDICATIONS       Discharge Medication List as of 2/27/2025 10:39 AM        CONTINUE these medications which have NOT CHANGED    Details   oseltamivir (TAMIFLU) 75 MG capsule Take 1 capsule by mouth 2 times daily for 5 days, Disp-10 capsule, R-0Normal      amLODIPine (NORVASC) 10 MG tablet Take 1 tablet by mouth daily, Disp-90 tablet, R-3Normal      lisinopril (PRINIVIL;ZESTRIL) 30  No

## 2025-02-27 NOTE — DISCHARGE INSTRUCTIONS
Rest.  Drink plenty of fluids.  You may continue Tamiflu or you may discontinue Tamiflu as it may increase your diarrhea.  Zofran will be sent for any nausea you may have.  Follow-up with primary care provider for any new or worsening symptoms go to the emergency department for any other symptoms or concerns deemed emergent.

## 2025-03-19 ENCOUNTER — TELEPHONE (OUTPATIENT)
Dept: FAMILY MEDICINE CLINIC | Age: 70
End: 2025-03-19

## 2025-03-19 DIAGNOSIS — I10 HYPERTENSION, ESSENTIAL: Primary | Chronic | ICD-10-CM

## 2025-03-20 NOTE — TELEPHONE ENCOUNTER
Pt due for fasting labs prior to next apt on 4/17/2025. Please call to have pt complete this. Thanks!    ASSESSMENT & PLAN   Diagnosis Orders   1. Hypertension, essential  CBC with Auto Differential    Comprehensive Metabolic Panel    Lipid Panel    TSH reflex to FT4    Albumin/Creatinine Ratio, Urine        Future Appointments   Date Time Provider Department Center   4/17/2025 10:00 AM Doug Goldsmith, DO [facility]   9/19/2025 11:45 AM Mady Remy, APRN - CNP [facility] [place] [facility] [place]

## 2025-03-20 NOTE — TELEPHONE ENCOUNTER
I called and spoke to Polo and he verbalized understanding I advised pt that I could send the lab orders as a reminder to have them done but pt did not want me to send lab orders to address on file. Pt states that he will remember.

## 2025-03-31 ENCOUNTER — LAB (OUTPATIENT)
Dept: LAB | Age: 70
End: 2025-03-31

## 2025-03-31 ENCOUNTER — RESULTS FOLLOW-UP (OUTPATIENT)
Dept: FAMILY MEDICINE CLINIC | Age: 70
End: 2025-03-31

## 2025-03-31 DIAGNOSIS — I10 HYPERTENSION, ESSENTIAL: Chronic | ICD-10-CM

## 2025-03-31 LAB
ALBUMIN SERPL BCG-MCNC: 4.2 G/DL (ref 3.4–4.9)
ALP SERPL-CCNC: 53 U/L (ref 40–129)
ALT SERPL W/O P-5'-P-CCNC: 35 U/L (ref 10–50)
ANION GAP SERPL CALC-SCNC: 9 MEQ/L (ref 8–16)
AST SERPL-CCNC: 26 U/L (ref 10–50)
BASOPHILS ABSOLUTE: 0.1 THOU/MM3 (ref 0–0.1)
BASOPHILS NFR BLD AUTO: 1.1 %
BILIRUB SERPL-MCNC: 0.4 MG/DL (ref 0.3–1.2)
BUN SERPL-MCNC: 17 MG/DL (ref 8–23)
CALCIUM SERPL-MCNC: 9.7 MG/DL (ref 8.8–10.2)
CHLORIDE SERPL-SCNC: 104 MEQ/L (ref 98–111)
CHOLEST SERPL-MCNC: 175 MG/DL (ref 100–199)
CO2 SERPL-SCNC: 25 MEQ/L (ref 22–29)
CREAT SERPL-MCNC: 0.9 MG/DL (ref 0.7–1.2)
CREAT UR-MCNC: 139 MG/DL
DEPRECATED RDW RBC AUTO: 45.2 FL (ref 35–45)
EOSINOPHIL NFR BLD AUTO: 2.8 %
EOSINOPHILS ABSOLUTE: 0.2 THOU/MM3 (ref 0–0.4)
ERYTHROCYTE [DISTWIDTH] IN BLOOD BY AUTOMATED COUNT: 14.2 % (ref 11.5–14.5)
GFR SERPL CREATININE-BSD FRML MDRD: > 90 ML/MIN/1.73M2
GLUCOSE SERPL-MCNC: 93 MG/DL (ref 74–109)
HCT VFR BLD AUTO: 44.7 % (ref 42–52)
HDLC SERPL-MCNC: 38 MG/DL
HGB BLD-MCNC: 14.6 GM/DL (ref 14–18)
IMM GRANULOCYTES # BLD AUTO: 0.05 THOU/MM3 (ref 0–0.07)
IMM GRANULOCYTES NFR BLD AUTO: 0.8 %
LDLC SERPL CALC-MCNC: 117 MG/DL
LYMPHOCYTES ABSOLUTE: 2.2 THOU/MM3 (ref 1–4.8)
LYMPHOCYTES NFR BLD AUTO: 33.6 %
MCH RBC QN AUTO: 28.9 PG (ref 26–33)
MCHC RBC AUTO-ENTMCNC: 32.7 GM/DL (ref 32.2–35.5)
MCV RBC AUTO: 88.3 FL (ref 80–94)
MICROALBUMIN UR-MCNC: < 2 MG/DL
MICROALBUMIN/CREAT RATIO PNL UR: 14 MG/G (ref 0–30)
MONOCYTES ABSOLUTE: 0.7 THOU/MM3 (ref 0.4–1.3)
MONOCYTES NFR BLD AUTO: 10.1 %
NEUTROPHILS ABSOLUTE: 3.4 THOU/MM3 (ref 1.8–7.7)
NEUTROPHILS NFR BLD AUTO: 51.6 %
NRBC BLD AUTO-RTO: 0 /100 WBC
PLATELET # BLD AUTO: 285 THOU/MM3 (ref 130–400)
PMV BLD AUTO: 10.2 FL (ref 9.4–12.4)
POTASSIUM SERPL-SCNC: 4.5 MEQ/L (ref 3.5–5.2)
PROT SERPL-MCNC: 7.3 G/DL (ref 6.4–8.3)
RBC # BLD AUTO: 5.06 MILL/MM3 (ref 4.7–6.1)
SODIUM SERPL-SCNC: 138 MEQ/L (ref 135–145)
TRIGL SERPL-MCNC: 101 MG/DL (ref 0–199)
TSH SERPL DL<=0.05 MIU/L-ACNC: 3.02 UIU/ML (ref 0.27–4.2)
WBC # BLD AUTO: 6.5 THOU/MM3 (ref 4.8–10.8)

## 2025-04-01 DIAGNOSIS — I10 HYPERTENSION, ESSENTIAL: Chronic | ICD-10-CM

## 2025-04-01 RX ORDER — LISINOPRIL 30 MG/1
30 TABLET ORAL DAILY
Qty: 90 TABLET | Refills: 3 | Status: SHIPPED | OUTPATIENT
Start: 2025-04-01

## 2025-04-01 RX ORDER — AMLODIPINE BESYLATE 10 MG/1
10 TABLET ORAL DAILY
Qty: 90 TABLET | Refills: 3 | Status: SHIPPED | OUTPATIENT
Start: 2025-04-01

## 2025-04-01 NOTE — TELEPHONE ENCOUNTER
Recent Visits  Date Type Provider Dept   09/30/24 Office Visit Doug Goldsmith, DO [facility]   08/08/24 Office Visit Doug Goldsmith, DO [facility]   06/11/24 Office Visit Doug Goldsmith, DO [facility]   04/16/24 Office Visit Doug Goldsmith, DO [facility]   03/08/24 Office Visit Doug Goldsmith, DO [facility]   Showing recent visits within past 540 days with a meds authorizing provider and meeting all other requirements  Future Appointments  Date Type Provider Dept   04/17/25 Appointment Doug Goldsmith, DO [facility]   Showing future appointments within next 150 days with a meds authorizing provider and meeting all other requirements

## 2025-05-19 NOTE — PROGRESS NOTES
Chief Complaint   Patient presents with    Medicare AWV     History obtained from the patient.    SUBJECTIVE:  Polo Mariano is a 70 y.o. male that presents today for     -HTN:     HPI:     Taking meds as prescribed ?: yes  Tolerating well ?: yes  Side Effects ?: no  BP at home ?: <140/90  Working on TLCS ?: yes  Chest Pain/SOB/Palpitations? Denies      -HLD:  Labs are stable  High ASCVD score  Declines treatment     The 10-year ASCVD risk score (Lorri MANZANO, et al., 2019) is: 26.7%       -Obesity:  Diet ok  Not exercising      -Vertigo:  Gets vertigo now and again  Doesn't last long  Maybe a few times per month  Offered vestibular rehab and further w/u in past and has declined  Prn meclizine works fine  No hearing loss or tinnitus  No sxs today      Age/Gender Health Maintenance    Lipid -   Lab Results   Component Value Date    CHOL 175 03/31/2025    CHOL 180 04/10/2024    CHOL 161 07/13/2021     Lab Results   Component Value Date    TRIG 101 03/31/2025    TRIG 156 04/10/2024    TRIG 101 07/13/2021     Lab Results   Component Value Date    HDL 38 03/31/2025    HDL 38 04/10/2024    HDL 42 07/13/2021     Lab Results   Component Value Date     03/31/2025     04/10/2024    LDL 99 07/13/2021       DM Screen -   Lab Results   Component Value Date/Time    GLUCOSE 93 03/31/2025 10:59 AM     No results found for: \"LABA1C\"      Colon Cancer Screening - + polyp SEPT 2020, repeat 5 years per [facility]  Lung Cancer Screening - never smoker    Tetanus - to get at pharmacy per medicare rules  Influenza Vaccine - Candidate FALL 2025  Pneumonia Vaccine - UTD x 2  Zoster - to get at pharmacy per medicare rules     PSA testing discussion - follows with urology  Lab Results   Component Value Date    PSA <0.02 09/03/2024    PSA <0.02 07/24/2023    PSA <0.02 01/17/2023       AAA Screening - never smoker      Current Outpatient Medications   Medication Sig Dispense Refill    lisinopril (PRINIVIL;ZESTRIL) 30 MG

## 2025-05-20 ENCOUNTER — OFFICE VISIT (OUTPATIENT)
Dept: FAMILY MEDICINE CLINIC | Age: 70
End: 2025-05-20

## 2025-05-20 VITALS
HEIGHT: 72 IN | SYSTOLIC BLOOD PRESSURE: 134 MMHG | DIASTOLIC BLOOD PRESSURE: 80 MMHG | HEART RATE: 81 BPM | OXYGEN SATURATION: 95 % | RESPIRATION RATE: 16 BRPM | BODY MASS INDEX: 39.5 KG/M2 | WEIGHT: 291.6 LBS | TEMPERATURE: 97.9 F

## 2025-05-20 DIAGNOSIS — E78.5 DYSLIPIDEMIA: Chronic | ICD-10-CM

## 2025-05-20 DIAGNOSIS — H81.13 BENIGN PAROXYSMAL POSITIONAL VERTIGO DUE TO BILATERAL VESTIBULAR DISORDER: ICD-10-CM

## 2025-05-20 DIAGNOSIS — Z00.00 MEDICARE ANNUAL WELLNESS VISIT, SUBSEQUENT: Primary | ICD-10-CM

## 2025-05-20 DIAGNOSIS — I10 HYPERTENSION, ESSENTIAL: Chronic | ICD-10-CM

## 2025-05-20 DIAGNOSIS — E66.9 OBESITY (BMI 30-39.9): Chronic | ICD-10-CM

## 2025-05-20 DIAGNOSIS — R42 VERTIGO: ICD-10-CM

## 2025-05-20 RX ORDER — MECLIZINE HYDROCHLORIDE 25 MG/1
25 TABLET ORAL 3 TIMES DAILY PRN
Qty: 30 TABLET | Refills: 2 | Status: SHIPPED | OUTPATIENT
Start: 2025-05-20

## 2025-05-20 SDOH — ECONOMIC STABILITY: FOOD INSECURITY: WITHIN THE PAST 12 MONTHS, YOU WORRIED THAT YOUR FOOD WOULD RUN OUT BEFORE YOU GOT MONEY TO BUY MORE.: NEVER TRUE

## 2025-05-20 SDOH — ECONOMIC STABILITY: FOOD INSECURITY: WITHIN THE PAST 12 MONTHS, THE FOOD YOU BOUGHT JUST DIDN'T LAST AND YOU DIDN'T HAVE MONEY TO GET MORE.: NEVER TRUE

## 2025-05-20 ASSESSMENT — PATIENT HEALTH QUESTIONNAIRE - PHQ9
SUM OF ALL RESPONSES TO PHQ QUESTIONS 1-9: 0
2. FEELING DOWN, DEPRESSED OR HOPELESS: NOT AT ALL
SUM OF ALL RESPONSES TO PHQ QUESTIONS 1-9: 0
SUM OF ALL RESPONSES TO PHQ QUESTIONS 1-9: 0
1. LITTLE INTEREST OR PLEASURE IN DOING THINGS: NOT AT ALL
SUM OF ALL RESPONSES TO PHQ QUESTIONS 1-9: 0

## 2025-05-20 NOTE — PATIENT INSTRUCTIONS
eating healthy, being active, staying at a weight that's healthy for you, and not smoking or using tobacco. It also includes taking medicines as directed, managing other health conditions, and trying to get a healthy amount of sleep.  Follow-up care is a key part of your treatment and safety. Be sure to make and go to all appointments, and call your doctor if you are having problems. It's also a good idea to know your test results and keep a list of the medicines you take.  How can you care for yourself at home?  Diet    Use less salt when you cook and eat. This helps lower your blood pressure. Taste food before salting. Add only a little salt when you think you need it. With time, your taste buds will adjust to less salt.     Eat fewer snack items, fast foods, canned soups, and other high-salt, high-fat, processed foods.     Read food labels and try to avoid saturated and trans fats. They increase your risk of heart disease by raising cholesterol levels.     Limit the amount of solid fat--butter, margarine, and shortening--you eat. Use olive, peanut, or canola oil when you cook. Bake, broil, and steam foods instead of frying them.     Eat a variety of fruit and vegetables every day. Dark green, deep orange, red, or yellow fruits and vegetables are especially good for you. Examples include spinach, carrots, peaches, and berries.     Foods high in fiber can reduce your cholesterol and provide important vitamins and minerals. High-fiber foods include whole-grain cereals and breads, oatmeal, beans, brown rice, citrus fruits, and apples.     Eat lean proteins. Heart-healthy proteins include seafood, lean meats and poultry, eggs, beans, peas, nuts, seeds, and soy products.     Limit drinks and foods with added sugar. These include candy, desserts, and soda pop.   Heart-healthy lifestyle    If your doctor recommends it, get more exercise. For many people, walking is a good choice. Or you may want to swim, bike, or do

## 2025-06-04 ENCOUNTER — APPOINTMENT (OUTPATIENT)
Dept: GENERAL RADIOLOGY | Age: 70
End: 2025-06-04
Payer: MEDICARE

## 2025-06-04 ENCOUNTER — HOSPITAL ENCOUNTER (EMERGENCY)
Age: 70
Discharge: HOME | End: 2025-06-04
Payer: MEDICARE

## 2025-06-04 VITALS
SYSTOLIC BLOOD PRESSURE: 128 MMHG | TEMPERATURE: 98.2 F | HEART RATE: 79 BPM | HEIGHT: 72 IN | RESPIRATION RATE: 20 BRPM | WEIGHT: 290 LBS | BODY MASS INDEX: 39.28 KG/M2 | OXYGEN SATURATION: 94 % | DIASTOLIC BLOOD PRESSURE: 54 MMHG

## 2025-06-04 DIAGNOSIS — M25.512 ACUTE PAIN OF LEFT SHOULDER: Primary | ICD-10-CM

## 2025-06-04 LAB
EKG ATRIAL RATE: 81 BPM
EKG P AXIS: 58 DEGREES
EKG P-R INTERVAL: 164 MS
EKG Q-T INTERVAL: 344 MS
EKG QRS DURATION: 82 MS
EKG QTC CALCULATION (BAZETT): 399 MS
EKG R AXIS: 60 DEGREES
EKG T AXIS: 49 DEGREES
EKG VENTRICULAR RATE: 81 BPM

## 2025-06-04 PROCEDURE — 99284 EMERGENCY DEPT VISIT MOD MDM: CPT

## 2025-06-04 PROCEDURE — 93010 ELECTROCARDIOGRAM REPORT: CPT | Performed by: INTERNAL MEDICINE

## 2025-06-04 PROCEDURE — 93005 ELECTROCARDIOGRAM TRACING: CPT | Performed by: STUDENT IN AN ORGANIZED HEALTH CARE EDUCATION/TRAINING PROGRAM

## 2025-06-04 PROCEDURE — 73030 X-RAY EXAM OF SHOULDER: CPT

## 2025-06-04 RX ORDER — LIDOCAINE 4 G/G
1 PATCH TOPICAL DAILY
Qty: 7 EACH | Refills: 0 | Status: SHIPPED | OUTPATIENT
Start: 2025-06-04 | End: 2025-06-11

## 2025-06-04 ASSESSMENT — PAIN SCALES - GENERAL: PAINLEVEL_OUTOF10: 4

## 2025-06-04 ASSESSMENT — PAIN DESCRIPTION - LOCATION: LOCATION: SHOULDER

## 2025-06-04 ASSESSMENT — PAIN - FUNCTIONAL ASSESSMENT: PAIN_FUNCTIONAL_ASSESSMENT: 0-10

## 2025-06-04 ASSESSMENT — PAIN DESCRIPTION - ORIENTATION: ORIENTATION: LEFT

## 2025-06-04 ASSESSMENT — PAIN DESCRIPTION - DESCRIPTORS: DESCRIPTORS: ACHING

## 2025-06-04 NOTE — DISCHARGE INSTRUCTIONS
Please call [facility] as soon as possible to be seen for possible rotator cuff tear.    Please utilize Tylenol and ibuprofen as needed for pain management.    Please utilize the diclofenac gel and lidocaine patches on top of the tender area.    Return if symptoms worsen or new to develop numbness, tingling, or decreased range of motion.

## 2025-06-04 NOTE — ED PROVIDER NOTES
[facility] EMERGENCY DEPARTMENT      EMERGENCY MEDICINE     Pt Name: Polo Mariano  MRN: [id number]  Birthdate 1955  Date of evaluation: 6/4/2025  Provider: Rand Verma PA-C    CHIEF COMPLAINT       Chief Complaint   Patient presents with    Shoulder Pain     HISTORY OF PRESENT ILLNESS   Polo Mariano is a pleasant 70 y.o. male who presents to the emergency department from from home, for evaluation of left shoulder pain.  Patient states that he fell on outstretched hands back in February and injured his left shoulder, he reports that this was his third fall but he has not fallen since February.  He denies seeking evaluation after the initial incident.  He states that he has had left shoulder pain on and off since that fall.  Patient describes pain that starts in his left shoulder and radiates to his neck, once it occurs he has to change position for the pain to slowly subside.  He also describes it as spasm that need to be manipulated out.  He states that at its worst he rates the pain a 5/10.  He has tried taking aspirin for pain relief with no relief.  He denies any history of other injury or surgeries on that shoulder.     PASTMEDICAL HISTORY     Past Medical History:   Diagnosis Date    Chronic venous insufficiency of lower extremity 08/08/2024    Dyslipidemia 04/15/2024    History of prostate cancer 10/18/2021    Hypertension, essential     Obesity (BMI 30-39.9)     Psoriasis        Patient Active Problem List   Diagnosis Code    Hypertension, essential I10    Obesity (BMI 30-39.9) E66.9    Psoriasis L40.9    History of prostate cancer Z85.46    Dyslipidemia E78.5    Chronic venous insufficiency of lower extremity I87.2     SURGICAL HISTORY       Past Surgical History:   Procedure Laterality Date    KNEE ARTHROSCOPY      right    PROSTATE BIOPSY  2021    PROSTATECTOMY Bilateral 12/2/2021    ROBOTIC ASSISTED LAPAROSCOPIC RADICAL PROSTECTOMY WITH BILATERAL LYMPH NODE DISSECTION performed by Phillip

## 2025-06-04 NOTE — ED NOTES
Pt to ER due to left shoulder pain. He reports he fell in February and caught himself with his left side and has had pain in his shoulder since. EKG completed upon arrival.

## 2025-06-05 ENCOUNTER — CARE COORDINATION (OUTPATIENT)
Dept: CARE COORDINATION | Age: 70
End: 2025-06-05

## 2025-06-05 NOTE — CARE COORDINATION
Ambulatory Care Coordination Note     2025 10:55 AM     Patient Current Location:  Home: [address] [place] [address]     This patient was received as a referral from Ambulatory Care Manager .    Patient contacted the patient by telephone. Verified name and  with patient as identifiers. Provided introduction to self, and explanation of the ACM role.   Patient accepted care management services at this time.          ACM: Ivett Luis RN     Challenges to be reviewed by the provider   Additional needs identified to be addressed with provider No                  Method of communication with provider: none.    Utilization: Initial Call - N/A    Care Summary Note: Spoke with Polo  Was ED follow up/ [facility] eligible for support  Discussed hx of fall in Feb and continued shoulder pain  No new falls  States he did not see [facility] in the past for this issue  States he called [facility] and was not able to get scheduled with anyone at that time  States he will call back today to get appt scheduled  Encouraged him to let me know if additional support was needed to schedule with [facility]  Able to  pain patches as ordered by ED  Will encourage follow up with [facility] within 7 days to address shoulder pain    Plan  Provide education to help manage health  Ensure follow up appt with [facility] scheduled  Ensure pain managed  Reinforce fall education and prevention to reduce risk for injury related to falls    Offered patient enrollment in the Remote Patient Monitoring (RPM) program for in-home monitoring: Yes, but did not enroll at this time: already monitoring with home equipment.     Assessments Completed:   General Assessment    Do you have any symptoms that are causing concern?: Yes  Progression since Onset: Unchanged  Reported Symptoms: Other (Comment: shoulder pain from hx of fall in Feb)          Medications Reviewed:   Completed during this call    Advance Care Planning:   Not reviewed during this call     Care Planning:

## 2025-06-05 NOTE — CARE COORDINATION
Ambulatory Care Coordination Note     6/5/2025 9:54 AM     Patient outreach attempt by this ACM today to offer care management services. ACM was unable to reach the patient by telephone today;   left voice message requesting a return phone call to this ACM.     ACM: Ivett Luis RN     Care Summary Note: ED followup/ [facility] eligible for support.    PCP/Specialist follow up:   Future Appointments         Provider Specialty Dept Phone    9/19/2025 11:45 AM Mady Remy, APRN - CNP Urology [phone]    5/20/2026 3:20 PM Doug Goldsmith, DO Family Medicine [phone]            Follow Up:   Plan for next AC outreach in approximately 1 week to complete:  Enrollment attempt .

## 2025-06-05 NOTE — CARE COORDINATION
Dr. Goldsmith,      I have enrolled Breezy into Care Coordination following ED follow up/ [facility] eligible for support.  He was in ED for shoulder pain from fall that occurred back in Feb.  No additional falls since.  He is going to follow up with [facility] regarding this and is working on getting appt scheduled.  Using lidocaine patches and Volatren gel for pain management.  Please approve Plan of Care using smart phrase . [email or web address].  Thank you!

## 2025-06-11 ENCOUNTER — CARE COORDINATION (OUTPATIENT)
Dept: CARE COORDINATION | Age: 70
End: 2025-06-11

## 2025-06-11 NOTE — CARE COORDINATION
Attempted to reach patient for continued Care Coordination follow up and education.  Patient was unavailable at the time of my call, and a generic voicemail message was left asking patient to return my call at [phone].

## 2025-06-12 ENCOUNTER — TRANSCRIBE ORDERS (OUTPATIENT)
Dept: ADMINISTRATIVE | Age: 70
End: 2025-06-12

## 2025-06-12 DIAGNOSIS — S49.92XA INJURY OF LEFT SHOULDER, INITIAL ENCOUNTER: Primary | ICD-10-CM

## 2025-06-18 ENCOUNTER — CARE COORDINATION (OUTPATIENT)
Dept: CARE COORDINATION | Age: 70
End: 2025-06-18

## 2025-06-18 NOTE — CARE COORDINATION
Ambulatory Care Coordination Note     2025 1:39 PM     Patient Current Location:  Home: [address] [place] [address]     ACM contacted the patient by telephone. Verified name and  with patient as identifiers.         ACM: Ivett Luis RN     Challenges to be reviewed by the provider   Additional needs identified to be addressed with provider No                  Method of communication with provider: none.    Utilization: Patient has not had any utilization since our last call.     Care Summary Note: Spoke with Polo Gibson doing okay for review  Has been following up with [facility] for shoulder pain  Scheduled to see [facility] provider again next week and has MRI scheduled  Denies needs from myself or PCP office at this time  Encouraged to call with any needs    Plan  Continue with follow up to ensure complete healing of shoulder  Work towards graduation if no additional support needed  Reinforce importance of early symptom recognition and reporting to prevent exacerbation and unnecessary hospitalization    Offered patient enrollment in the Remote Patient Monitoring (RPM) program for in-home monitoring: Yes, but did not enroll at this time: controlled chronic disease management.     Assessments Completed:   General Assessment    Do you have any symptoms that are causing concern?: Yes  Progression since Onset: Unchanged  Reported Symptoms: Other (Comment: shoulder pain- following with [facility])          Medications Reviewed:   Patient denies any changes with medications and reports taking all medications as prescribed.    Advance Care Planning:   Not reviewed during this call     Care Planning:    Goals Addressed                      This Visit's Progress      Reduce Falls  (pt-stated)   On track      I will reduce my risk of falls by the following:  be proactive with fall interventions to reduce risk for injury related to falls    Barriers: need for additional support and education  Plan for overcoming my

## 2025-06-25 ENCOUNTER — HOSPITAL ENCOUNTER (OUTPATIENT)
Dept: MRI IMAGING | Age: 70
Discharge: HOME | End: 2025-06-25
Attending: ORTHOPAEDIC SURGERY
Payer: MEDICARE

## 2025-06-25 DIAGNOSIS — S49.92XA INJURY OF LEFT SHOULDER, INITIAL ENCOUNTER: ICD-10-CM

## 2025-06-25 PROCEDURE — 73221 MRI JOINT UPR EXTREM W/O DYE: CPT

## 2025-07-09 ENCOUNTER — CARE COORDINATION (OUTPATIENT)
Dept: CARE COORDINATION | Age: 70
End: 2025-07-09

## 2025-07-09 RX ORDER — MELOXICAM 7.5 MG/1
7.5 TABLET ORAL DAILY
Qty: 30 TABLET | Refills: 1 | Status: SHIPPED | OUTPATIENT
Start: 2025-07-09

## 2025-07-09 NOTE — TELEPHONE ENCOUNTER
Let's try Mobic, a prescription anti-inflammatory and see how this does for him. He can take it daily or as needed. Rx sent to [facility] [place]. Thanks!

## 2025-07-09 NOTE — CARE COORDINATION
Polo Jim states he completed his follow up with [facility] for his left shoulder pain which resulted from fall in Feb.  States MRI showed left shoulder torn rotator cuff and torn tendon.  [facility] successful due to the [facility] of the injury.    Polo states he has pain something in that shoulder especially at night.  He is asking for something to help manage his pain on an as needed basis.  He is not looking for a strong pain medication, but rather something to take the edge off when it flares up.  Pharmacy is [facility] on [address]. Please advise.  Thank you  
Pt informed of Mobic sent in . Pt voiced understanding with no further questions at this time.     
Doug SUNG,  Family Medicine [phone]            Follow Up:   Plan for next ACM outreach in approximately 2 weeks to complete:  - disease specific assessments  - medication review   - goal progression  - education .   Patient  is agreeable to this plan.

## 2025-07-22 ENCOUNTER — CARE COORDINATION (OUTPATIENT)
Dept: CARE COORDINATION | Age: 70
End: 2025-07-22

## 2025-07-22 NOTE — CARE COORDINATION
Ambulatory Care Coordination Note  2025    Patient Current Location:  Home: [address] [place] [address]     APPLE ELAINE contacted the patient by telephone. Verified name and  with patient as identifiers. Provided introduction to self, and explanation of the APPLE ELAINE role.     Challenges to be reviewed by the provider   Additional needs identified to be addressed with provider: No  none               Method of communication with provider: none.    I spoke with the patient for continued Care Coordination follow up and education. States he completed MRI for [facility] and was told he has a left shoulder torn rotator cuff and torn tendon of that shoulder.  States they are not going to do surgery because of the age of the injury.  Patient has Mobic to take as needed.  Patient is considering doing PT at the hospital vs [facility].  Advised patient to notify PCP office if he decides and a referral can be sent.  Educated on how to identify sx's that are worse than the baseline and the importance of early symptom recognition and reporting to prevent exacerbation which may lead to ED visits and hospital admissions.  I advised patient to contact PCP office if needed.  No further needs at this time.         Lab Results       None            Care Coordination Interventions    Referral from Primary Care Provider: No  Suggested Interventions and Community Resources  Fall Risk Prevention: Completed  Home Health Services: Not Started  Medication Assistance Program: Not Started  Occupational Therapy: Not Started  Physical Therapy: Not Started          Goals Addressed    None         Future Appointments   Date Time Provider Department Center   2025 11:45 AM Mady Remy, APRN - CNP [facility] [place] [facility] [place]   2026  3:20 PM Doug Goldsmith,  Fam Med [facility]

## 2025-08-06 ENCOUNTER — CARE COORDINATION (OUTPATIENT)
Dept: CARE COORDINATION | Age: 70
End: 2025-08-06

## 2025-08-21 ENCOUNTER — CARE COORDINATION (OUTPATIENT)
Dept: CARE COORDINATION | Age: 70
End: 2025-08-21

## 2025-08-28 ENCOUNTER — CARE COORDINATION (OUTPATIENT)
Dept: CARE COORDINATION | Age: 70
End: 2025-08-28

## 2025-09-02 RX ORDER — MELOXICAM 7.5 MG/1
7.5 TABLET ORAL DAILY
Qty: 30 TABLET | Refills: 0 | Status: SHIPPED | OUTPATIENT
Start: 2025-09-02

## (undated) DEVICE — 3M™ IOBAN™ 2 ANTIMICROBIAL INCISE DRAPE 6650EZ: Brand: IOBAN™ 2

## (undated) DEVICE — COVER EQUIP STEEP TREND EZ CLN UP WTRPRF DISP FOR STD SZ

## (undated) DEVICE — SYRINGE CATH TIP 50ML

## (undated) DEVICE — GOWN,SIRUS,NON REINFRCD,LARGE,SET IN SL: Brand: MEDLINE

## (undated) DEVICE — Z DISCONTINUED BY MEDLINE USE 2711682 TRAY SKIN PREP DRY W/ PREM GLV

## (undated) DEVICE — GAUZE,SPONGE,8"X4",12PLY,XRAY,STRL,LF: Brand: MEDLINE

## (undated) DEVICE — SUTURE ABSORB MONOFILAMENT 3-0 RB 1 6IN STRATAFIX SPRL SXMP2B402

## (undated) DEVICE — ADHESIVE SKIN CLSR 0.7ML TOP DERMBND ADV

## (undated) DEVICE — PUMP SUC IRR TBNG L10FT W/ HNDPC ASSEMB STRYKEFLOW 2

## (undated) DEVICE — SUTURE MCRYL SZ 4-0 L27IN ABSRB UD L19MM PS-2 1/2 CIR PRIM Y426H

## (undated) DEVICE — CLIP INT L POLYMER LOK LIG HEM O LOK

## (undated) DEVICE — TROCAR: Brand: KII FIOS FIRST ENTRY

## (undated) DEVICE — TRI-LUMEN FILTERED TUBE SET WITH ACTIVATED CHARCOAL FILTER: Brand: AIRSEAL

## (undated) DEVICE — BLADELESS OBTURATOR: Brand: WECK VISTA

## (undated) DEVICE — INTENDED FOR TISSUE SEPARATION, AND OTHER PROCEDURES THAT REQUIRE A SHARP SURGICAL BLADE TO PUNCTURE OR CUT.: Brand: BARD-PARKER ® CARBON RIB-BACK BLADES

## (undated) DEVICE — PACK-MAJOR

## (undated) DEVICE — SUTURE VCRL + SZ 0 L18IN ABSRB UD L36MM CT-1 1/2 CIR VCP840D

## (undated) DEVICE — SUTURE MCRYL SZ 3-0 L27IN ABSRB VLT L17MM RB-1 1/2 CIR Y305H

## (undated) DEVICE — DRAPE,ROBOTICS,STERILE: Brand: MEDLINE

## (undated) DEVICE — COLUMN DRAPE

## (undated) DEVICE — AIRSEAL 12 MM ACCESS PORT AND PALM GRIP OBTURATOR WITH BLADELESS OPTICAL TIP, 120 MM LENGTH: Brand: AIRSEAL

## (undated) DEVICE — DRAINBAG,ANTI-REFLUX TOWER,L/F,2000ML,LL: Brand: MEDLINE

## (undated) DEVICE — ELECTRO LUBE IS A SINGLE PATIENT USE DEVICE THAT IS INTENDED TO BE USED ON ELECTROSURGICAL ELECTRODES TO REDUCE STICKING.: Brand: KEY SURGICAL ELECTRO LUBE

## (undated) DEVICE — CANNULA SEAL

## (undated) DEVICE — INSUFFLATION NEEDLE TO ESTABLISH PNEUMOPERITONEUM.: Brand: INSUFFLATION NEEDLE

## (undated) DEVICE — ARM DRAPE

## (undated) DEVICE — CATHETER,FOLEY,SILI-ELAST,LTX,20FR,30ML: Brand: MEDLINE

## (undated) DEVICE — SOLUTION SURG PREP POV IOD 7.5% 4 OZ

## (undated) DEVICE — SUTURE ABSORBABLE MONOFILAMENT 0 CT-2 12 IN STRATAFIX SPRL SXPP1B405

## (undated) DEVICE — SYRINGE MED 10ML LUERLOCK TIP W/O SFTY DISP

## (undated) DEVICE — SUTURE VCRL + SZ 2-0 L27IN ABSRB WHT SH 1/2 CIR TAPERCUT VCP417H

## (undated) DEVICE — 35 ML SYRINGE LUER-LOCK TIP: Brand: MONOJECT

## (undated) DEVICE — HYPODERMIC SAFETY NEEDLE: Brand: MAGELLAN

## (undated) DEVICE — SOLUTION ANTIFOG VIS SYS CLEARIFY LAPSCP

## (undated) DEVICE — PACK PROCEDURE SURG SET UP SRMC

## (undated) DEVICE — SUTURE V-LOC 180 SZ 2-0 L12IN ABSRB VLT GS-21 L37MM 1/2 CIR VLOCM0315

## (undated) DEVICE — TISSUE RETRIEVAL SYSTEM: Brand: INZII RETRIEVAL SYSTEM

## (undated) DEVICE — CLIP INT XL YEL POLYMER HEM-O-LOK WECK

## (undated) DEVICE — CONTAINER,SPECIMEN,PNEU TUBE,4OZ,OR STRL: Brand: MEDLINE

## (undated) DEVICE — GLOVE ORANGE PI 7 1/2   MSG9075

## (undated) DEVICE — GOWN,SIRUS,NONRNF,SETINSLV,XL,20/CS: Brand: MEDLINE

## (undated) DEVICE — TIP COVER ACCESSORY

## (undated) DEVICE — PENCIL SMK EVAC ALL IN 1 DSGN ENH VISIBILITY IMPROVED AIR